# Patient Record
Sex: MALE | Race: WHITE | NOT HISPANIC OR LATINO | URBAN - METROPOLITAN AREA
[De-identification: names, ages, dates, MRNs, and addresses within clinical notes are randomized per-mention and may not be internally consistent; named-entity substitution may affect disease eponyms.]

---

## 2017-02-03 ENCOUNTER — TELEPHONE (OUTPATIENT)
Dept: FAMILY MEDICINE | Facility: CLINIC | Age: 52
End: 2017-02-03

## 2017-02-03 NOTE — TELEPHONE ENCOUNTER
Reason for Call:  Other questions     Detailed comments: pt is calling and was seen by cardiology in [place] yesterday and was told to get some testing done   And pt wants to know if PCP will order so he can have [place]   He is requesting a lipid panel and stress echo       Phone Number Patient can be reached at: Home number on file [phone] (home)    Best Time: any     Can we leave a detailed message on this number? YES    Call taken on 2/3/2017 at 8:08 AM by Jeimy Santacruz

## 2017-02-03 NOTE — TELEPHONE ENCOUNTER
Patient reports he sees a cardiologist with [facility].  That cardiologist ordered a stress echo - to be completed in [place].  Patient requesting to come here instead - asking for stress echo order.  RN advised appt and then PCP may order.  Patient states he will just go to [place].    Lipid lab - he does have a future order for this.  He will come here to complete - transferred to scheduling.    Apple BENDER RN

## 2017-02-06 DIAGNOSIS — E78.5 HYPERLIPIDEMIA WITH TARGET LDL LESS THAN 130: ICD-10-CM

## 2017-02-06 DIAGNOSIS — Z82.49 FH: PREMATURE CORONARY HEART DISEASE: ICD-10-CM

## 2017-02-06 LAB
ALT SERPL W P-5'-P-CCNC: 48 U/L (ref 0–70)
CHOLEST SERPL-MCNC: 173 MG/DL
HDLC SERPL-MCNC: 35 MG/DL
LDLC SERPL CALC-MCNC: 108 MG/DL
NONHDLC SERPL-MCNC: 138 MG/DL
TRIGL SERPL-MCNC: 152 MG/DL

## 2017-02-06 PROCEDURE — 36415 COLL VENOUS BLD VENIPUNCTURE: CPT | Performed by: FAMILY MEDICINE

## 2017-02-06 PROCEDURE — 84460 ALANINE AMINO (ALT) (SGPT): CPT | Performed by: FAMILY MEDICINE

## 2017-02-06 PROCEDURE — 80061 LIPID PANEL: CPT | Performed by: FAMILY MEDICINE

## 2017-03-03 DIAGNOSIS — E78.1 HYPERTRIGLYCERIDEMIA: ICD-10-CM

## 2017-03-03 DIAGNOSIS — Z72.0 TOBACCO ABUSE: ICD-10-CM

## 2017-03-03 DIAGNOSIS — Z82.49 FH: PREMATURE CORONARY HEART DISEASE: ICD-10-CM

## 2017-03-03 RX ORDER — ROSUVASTATIN CALCIUM 40 MG/1
20 TABLET, COATED ORAL DAILY
Qty: 45 TABLET | Refills: 0 | Status: SHIPPED
Start: 2017-03-03 | End: 2017-06-05

## 2017-03-03 NOTE — TELEPHONE ENCOUNTER
CRESTOR 40MG     Last Written Prescription Date: 02/10/2016  Last Fill Quantity: 45 # refills: 3  Last Office Visit with [facility], P or [facility] prescribing provider: 08/22/2016       Lab Results   Component Value Date    CHOL 173 02/06/2017     Lab Results   Component Value Date    HDL 35 02/06/2017     Lab Results   Component Value Date     02/06/2017    LDL 41 06/26/2013     Lab Results   Component Value Date    TRIG 152 02/06/2017     Lab Results   Component Value Date    CHOLHDLRATIO 4.6 09/25/2015     Mayra Marquis  [facility]  [phone]

## 2017-03-03 NOTE — TELEPHONE ENCOUNTER
Routing refill request to provider for review/approval because:  Labs out of range:  Cholesterol labs elevated      Mag Arzola, Pharm.D.  on behalf of [facility] Pharmacist   [email or web address]

## 2017-05-09 ENCOUNTER — TELEPHONE (OUTPATIENT)
Dept: FAMILY MEDICINE | Facility: CLINIC | Age: 52
End: 2017-05-09

## 2017-05-09 NOTE — TELEPHONE ENCOUNTER
Reason for Call:  Medication or medication refill:    Do you use a [facility]?  Name of the pharmacy and phone number for the current request:  [facility] [phone]    Name of the medication requested: Pt would like a new Rx to help him quit smoking.  He was on something a while back, but it made his dizzy and sick.  [facility]    Other request:     Can we leave a detailed message on this number? YES    Phone number patient can be reached at: Home number on file [phone] (home)    Best Time: any    Call taken on 5/9/2017 at 4:18 PM by Kinsey Soares

## 2017-05-10 NOTE — TELEPHONE ENCOUNTER
This requires an office visit. Left a detailed message below as requested to schedule a visit    Ann Kim RN

## 2017-06-03 DIAGNOSIS — J30.9 RHINITIS, ALLERGIC: Primary | ICD-10-CM

## 2017-06-03 RX ORDER — LEVOCETIRIZINE DIHYDROCHLORIDE 5 MG/1
5 TABLET, FILM COATED ORAL EVERY EVENING
Qty: 90 TABLET | Refills: 0 | Status: SHIPPED | OUTPATIENT
Start: 2017-06-03 | End: 2018-08-20

## 2017-06-03 NOTE — TELEPHONE ENCOUNTER
LEVOCETIRIZINE      Last Written Prescription Date: 05-27-16  Last Fill Quantity: 90,  # refills: 3   Last Office Visit with [facility], P or [facility] prescribing provider: 08-22-16

## 2017-06-03 NOTE — TELEPHONE ENCOUNTER
Levocetirizine approved per medication refill protocol one time. Due for office visit in August.    Gloria Kimball, [facility] [place]  [phone]

## 2017-06-05 ENCOUNTER — TELEPHONE (OUTPATIENT)
Dept: FAMILY MEDICINE | Facility: CLINIC | Age: 52
End: 2017-06-05

## 2017-06-05 DIAGNOSIS — E78.1 HYPERTRIGLYCERIDEMIA: ICD-10-CM

## 2017-06-05 DIAGNOSIS — Z82.49 FH: PREMATURE CORONARY HEART DISEASE: ICD-10-CM

## 2017-06-05 DIAGNOSIS — J31.0 CHRONIC RHINITIS: Primary | ICD-10-CM

## 2017-06-05 DIAGNOSIS — Z72.0 TOBACCO ABUSE: ICD-10-CM

## 2017-06-05 RX ORDER — ROSUVASTATIN CALCIUM 40 MG/1
20 TABLET, COATED ORAL DAILY
Qty: 45 TABLET | Refills: 0 | Status: SHIPPED | OUTPATIENT
Start: 2017-06-05 | End: 2017-07-17

## 2017-06-05 NOTE — TELEPHONE ENCOUNTER
Levocetirizine requires a prior authorization. Please call [facility] Agent at [phone] with patient ID# [id number]. Please let us know the outcome.  Thanks,   Hannah Noel  Certified Pharmacy Technician  [facility]  [phone]

## 2017-06-05 NOTE — TELEPHONE ENCOUNTER
Crestor     Last Written Prescription Date: 03/03/17  Last Fill Quantity: 45, # refills: 0  Last Office Visit with [facility], P or [facility] prescribing provider: 08/22/16       Lab Results   Component Value Date    CHOL 173 02/06/2017     Lab Results   Component Value Date    HDL 35 02/06/2017     Lab Results   Component Value Date     02/06/2017     Lab Results   Component Value Date    TRIG 152 02/06/2017     Lab Results   Component Value Date    CHOLHDLRATIO 4.6 09/25/2015

## 2017-06-07 RX ORDER — CETIRIZINE HYDROCHLORIDE 10 MG/1
10 TABLET ORAL EVERY EVENING
Qty: 30 TABLET | Refills: 11 | Status: SHIPPED | OUTPATIENT
Start: 2017-06-07 | End: 2018-08-20

## 2017-06-07 NOTE — TELEPHONE ENCOUNTER
Patient notified.  He picked up the levocetirizine from the pharmacy all ready. He paid out of pocket for a 90 day supply.  He will continue with this for now.    Kelli Moreno RN

## 2017-06-07 NOTE — TELEPHONE ENCOUNTER
PA for levocetirizine has been denied.  Patient needs to have failed therapy with each of the generically available antihistamines for a duration of 2 weeks each.  The preferred are cetirizine (OTC) and loratadine (RX or OTC).

## 2017-06-07 NOTE — TELEPHONE ENCOUNTER
Please call patient,  He has to try both generic cetirizine and loratadine medication each for 2 weeks before they would approve the levocetirizine.  I have sent the first prescription in call cetirizine to the pharmacy.  He needs to call if this does not work out.  Joel Santacruz MD  Family Medicine

## 2017-06-07 NOTE — TELEPHONE ENCOUNTER
Please see Rika's message below.  Per patient's med history he has been on cetirizine in the past, but not the loratadine.    Kelli Moreno RN

## 2017-07-05 DIAGNOSIS — E78.1 HYPERTRIGLYCERIDEMIA: Primary | ICD-10-CM

## 2017-07-05 DIAGNOSIS — E78.5 HYPERLIPIDEMIA WITH TARGET LDL LESS THAN 130: ICD-10-CM

## 2017-07-17 ENCOUNTER — OFFICE VISIT (OUTPATIENT)
Dept: FAMILY MEDICINE | Facility: CLINIC | Age: 52
End: 2017-07-17
Payer: MEDICAID

## 2017-07-17 VITALS
HEART RATE: 64 BPM | HEIGHT: 71 IN | TEMPERATURE: 97 F | DIASTOLIC BLOOD PRESSURE: 60 MMHG | SYSTOLIC BLOOD PRESSURE: 100 MMHG | WEIGHT: 191 LBS | BODY MASS INDEX: 26.74 KG/M2

## 2017-07-17 DIAGNOSIS — E78.5 HYPERLIPIDEMIA WITH TARGET LDL LESS THAN 130: Primary | ICD-10-CM

## 2017-07-17 DIAGNOSIS — Z82.49 FH: PREMATURE CORONARY HEART DISEASE: ICD-10-CM

## 2017-07-17 DIAGNOSIS — E78.1 HYPERTRIGLYCERIDEMIA: ICD-10-CM

## 2017-07-17 DIAGNOSIS — Z72.0 TOBACCO ABUSE: ICD-10-CM

## 2017-07-17 LAB
CHOLEST SERPL-MCNC: 163 MG/DL
HDLC SERPL-MCNC: 35 MG/DL
LDLC SERPL CALC-MCNC: 105 MG/DL
NONHDLC SERPL-MCNC: 128 MG/DL
TRIGL SERPL-MCNC: 115 MG/DL

## 2017-07-17 PROCEDURE — 36415 COLL VENOUS BLD VENIPUNCTURE: CPT | Performed by: FAMILY MEDICINE

## 2017-07-17 PROCEDURE — 80061 LIPID PANEL: CPT | Performed by: FAMILY MEDICINE

## 2017-07-17 PROCEDURE — 99213 OFFICE O/P EST LOW 20 MIN: CPT | Performed by: FAMILY MEDICINE

## 2017-07-17 RX ORDER — ROSUVASTATIN CALCIUM 40 MG/1
20 TABLET, COATED ORAL DAILY
Qty: 45 TABLET | Refills: 3 | Status: SHIPPED | OUTPATIENT
Start: 2017-07-17 | End: 2018-08-11

## 2017-07-17 NOTE — PATIENT INSTRUCTIONS
We will let you know about your lab.          Thank you for choosing [facility].  You may be receiving a survey in the mail from Trini Justin regarding your visit today.  Please take a few minutes to complete and return the survey to let us know how we are doing.      If you have questions or concerns, please contact us via FanXchange or you can contact your care team at [phone].    Our Clinic hours are:  Monday 6:40 am  to 7:00 pm  Tuesday -Friday 6:40 am to 5:00 pm    The [place] outpatient lab hours are:  Monday - Friday 6:10 am to 4:45 pm  Saturdays 7:00 am to 11:00 am  Appointments are required, call [phone]    If you have clinical questions after hours or would like to schedule an appointment,  call the clinic at [phone].

## 2017-07-17 NOTE — MR AVS SNAPSHOT
After Visit Summary   7/17/2017    Andrei Mckenzie    MRN: [id number]           Patient Information     Date Of Birth          1965        Visit Information        Provider Department      7/17/2017 8:20 AM Joel Santacruz MD [facility]        Today's Diagnoses     Hyperlipidemia with target LDL less than 130    -  1    Hypertriglyceridemia        FH: premature coronary heart disease        Tobacco abuse          Care Instructions    We will let you know about your lab.          Thank you for choosing [facility].  You may be receiving a survey in the mail from Trini Justin regarding your visit today.  Please take a few minutes to complete and return the survey to let us know how we are doing.      If you have questions or concerns, please contact us via [employer] or you can contact your care team at [phone].    Our Clinic hours are:  Monday 6:40 am  to 7:00 pm  Tuesday -Friday 6:40 am to 5:00 pm    The [place] outpatient lab hours are:  Monday - Friday 6:10 am to 4:45 pm  Saturdays 7:00 am to 11:00 am  Appointments are required, call [phone]    If you have clinical questions after hours or would like to schedule an appointment,  call the clinic at [phone].            Follow-ups after your visit        Who to contact     If you have questions or need follow up information about today's clinic visit or your schedule please contact [facility] directly at [phone].  Normal or non-critical lab and imaging results will be communicated to you by SoundTaghart, letter or phone within 4 business days after the clinic has received the results. If you do not hear from us within 7 days, please contact the clinic through Osent or phone. If you have a critical or abnormal lab result, we will notify you by phone as soon as possible.  Submit refill requests through [employer] or call your pharmacy and they will forward the refill request to us. Please allow 3 business  "days for your refill to be completed.          Additional Information About Your Visit        MyChart Information     [employer]omasoft gives you secure access to your electronic health record. If you see a primary care provider, you can also send messages to your care team and make appointments. If you have questions, please call your primary care clinic.  If you do not have a primary care provider, please call [phone] and they will assist you.        Care EveryWhere ID     This is your Care EveryWhere ID. This could be used by other organizations to access your [place] medical records  [id number]        Your Vitals Were     Pulse Temperature Height BMI (Body Mass Index)          64 97  F (36.1  C) (Tympanic) 5' 11\" (1.803 m) 26.64 kg/m2         Blood Pressure from Last 3 Encounters:   07/17/17 100/60   08/22/16 104/66   11/25/15 119/83    Weight from Last 3 Encounters:   07/17/17 191 lb (86.6 kg)   08/22/16 197 lb (89.4 kg)   11/25/15 198 lb (89.8 kg)              We Performed the Following     Lipid panel reflex to direct LDL          Today's Medication Changes          These changes are accurate as of: 7/17/17  9:01 AM.  If you have any questions, ask your nurse or doctor.               Stop taking these medicines if you haven't already. Please contact your care team if you have questions.     Grape Seed 100 MG Caps   Stopped by:  Joel Santacruz MD           Turmeric Curcumin 500 MG Caps   Stopped by:  Joel Santacruz MD                    Primary Care Provider Office Phone # Fax #    Joel Santacruz [phone]       [facility] [address] [address]        Equal Access to Services     [facility]: Hadii pietro walsh Somaxwell, waaxda luqadaha, qaybta kaalmada gwyn smith. So [facility] [phone].    ATENCIÓN: Si habla español, tiene a reyes disposición servicios gratuitos de asistencia lingüística. Llame al " [phone].    We comply with applicable federal civil rights laws and [place] laws. We do not discriminate on the basis of race, color, national origin, age, disability sex, sexual orientation or gender identity.            Thank you!     Thank you for choosing [facility]  for your care. Our goal is always to provide you with excellent care. Hearing back from our patients is one way we can continue to improve our services. Please take a few minutes to complete the written survey that you may receive in the mail after your visit with us. Thank you!             Your Updated Medication List - Protect others around you: Learn how to safely use, store and throw away your medicines at www.disposemymeds.org.          This list is accurate as of: 7/17/17  9:01 AM.  Always use your most recent med list.                   Brand Name Dispense Instructions for use Diagnosis    arginine 500 MG capsule     60 capsule    Take 1 capsule (500 mg) by mouth 2 times daily        aspirin 81 MG tablet      Take 1 tablet (81 mg) by mouth daily        beclomethasone 80 MCG/ACT Inhaler    QVAR    1 Inhaler    1 puff into both nares twice daily with adaptor    Allergic rhinitis, unspecified allergic rhinitis type       cetirizine 10 MG tablet    zyrTEC    30 tablet    Take 1 tablet (10 mg) by mouth every evening    Chronic rhinitis       citrulline 600 MG Caps capsule    L-CITRULLINE    60 capsule    Take 1 capsule (600 mg) by mouth 2 times daily        coenzyme Q-10 100 MG Caps     120 capsule    Take 200 mg by mouth 2 times daily        levocetirizine 5 MG tablet    XYZAL    90 tablet    Take 1 tablet (5 mg) by mouth every evening    Rhinitis, allergic       Magnesium Citrate 100 MG Tabs     60 tablet    Take 400 mg by mouth 2 times daily        Menaquinone-7 100 MCG Caps     30 capsule    Take 500 mcg by mouth daily        MULTIVITAL PO      Take 1 tablet by mouth daily        rosuvastatin 40 MG tablet    CRESTOR    45  tablet    Take 0.5 tablets (20 mg) by mouth daily    FH: premature coronary heart disease, Tobacco abuse, Hypertriglyceridemia       TGT EYE ITCH RELIEF OP

## 2017-07-17 NOTE — NURSING NOTE
"Chief Complaint   Patient presents with     Lipids     had labs drawn immediately before this appt. Discuss which is the best testing for laboratory testing. Wants to go off his statins.       Initial /60 (Cuff Size: Adult Large)  Pulse 64  Temp 97  F (36.1  C) (Tympanic)  Ht 5' 11\" (1.803 m)  Wt 191 lb (86.6 kg)  BMI 26.64 kg/m2 Estimated body mass index is 26.64 kg/(m^2) as calculated from the following:    Height as of this encounter: 5' 11\" (1.803 m).    Weight as of this encounter: 191 lb (86.6 kg).  Medication Reconciliation: complete  "

## 2017-07-17 NOTE — PROGRESS NOTES
"  SUBJECTIVE:                                                    Andrei Mckenzie is a 51 year old male who presents to clinic today for the following health issues:  Chief Complaint   Patient presents with     Lipids     had labs drawn immediately before this appt. Discuss which is the best testing for laboratory testing. Wants to go off his statins.         Hyperlipidemia Follow-Up      Rate your low fat/cholesterol diet?: good    Taking statin?  Yes, not sure    Other lipid medications/supplements?:  Co-Q 10    Amount of exercise or physical activity: 6-7 days/week for an average of 45-60 minutes    Problems taking medications regularly: No    Medication side effects: none    Diet: more vegetables, reduced animal and dairy products.          Problem list and histories reviewed & adjusted, as indicated.  Additional history: as documented        Reviewed and updated as needed this visit by clinical staff       Reviewed and updated as needed this visit by Provider         ROS:  Review Of Systems  Skin: negative  Eyes:   Ears/Nose/Throat:   Respiratory: slight cough from cleaning this weekend  Cardiovascular: negative  Gastrointestinal:   Genitourinary:   Musculoskeletal:   Neurologic:   Psychiatric:   Hematologic/Lymphatic/Immunologic:   Endocrine:       OBJECTIVE:                                                    /60 (Cuff Size: Adult Large)  Pulse 64  Temp 97  F (36.1  C) (Tympanic)  Ht 5' 11\" (1.803 m)  Wt 191 lb (86.6 kg)  BMI 26.64 kg/m2  Body mass index is 26.64 kg/(m^2).  GENERAL APPEARANCE: healthy, alert and no distress  RESP: lungs clear to auscultation - no rales, rhonchi or wheezes  CV: regular rates and rhythm, normal S1 S2, no S3 or S4 and no murmur, click or rub  SKIN: no suspicious lesions or rashes  NEURO: Normal strength and tone, mentation intact and speech normal  PSYCH: mentation appears normal and affect normal/bright         ASSESSMENT/PLAN:                                              "       1. Hyperlipidemia with target LDL less than 130  Will let him know about his results.  He has been making changes to his lifestyle.  He still is smoking and trying to quit.  Use the nicotine lozenges to try and quit.    - Lipid panel reflex to direct LDL    2. Hypertriglyceridemia    - Lipid panel reflex to direct LDL    3. FH: premature coronary heart disease      4. Tobacco abuse  He still is smoking, recommend to quit      See Patient Instructions  Counseling/Coordination of care is over 10 min in 15 min appt.      Joel Santacruz MD  [facility]

## 2017-11-08 ENCOUNTER — MYC MEDICAL ADVICE (OUTPATIENT)
Dept: FAMILY MEDICINE | Facility: CLINIC | Age: 52
End: 2017-11-08

## 2017-11-08 NOTE — TELEPHONE ENCOUNTER
Entered by Joel Santacruz MD at 7/17/2017 11:16 AM   Read by Andrei Mckenzie at 11/8/2017  9:39 AM   Lopez Forbes,   Your cholesterol looks better!  Your triglycerides have come down.   At this time I would not make any changes to your cholesterol medication, however recheck it in 3 months.   Sincerely,   Joel Santacruz MD      Due for labs, but reporting he is not on crestor, and unable to get them according to [place] SU[place]?   Sent him a message.   Will postpone and be sure he has responded or scheduled appt,  Shanna Lundberg RNC

## 2017-11-09 NOTE — TELEPHONE ENCOUNTER
"Dr Santacruz, please see his mychart notes, he has not been on the prescribed crestor, he states due to a formulary issue .   He wants to 'further explore\" this issue with you and has many questions,   Do you want him to do the labs now and make an appt with you after?   Recent Labs   Lab Test  07/17/17   0822  02/06/17   0727   09/25/15   0709  04/10/15   0624   CHOL  163  173   < >  164  142   HDL  35*  35*   < >  36*  36*   LDL  105*  108*   < >  101  83   TRIG  115  152*   < >  133  117   CHOLHDLRATIO   --    --    --   4.6  3.9    < > = values in this interval not displayed.     Shanna Lundberg RNC    "

## 2017-11-13 ENCOUNTER — OFFICE VISIT (OUTPATIENT)
Dept: FAMILY MEDICINE | Facility: CLINIC | Age: 52
End: 2017-11-13
Payer: COMMERCIAL

## 2017-11-13 VITALS
BODY MASS INDEX: 26.32 KG/M2 | WEIGHT: 188 LBS | HEART RATE: 84 BPM | HEIGHT: 71 IN | SYSTOLIC BLOOD PRESSURE: 100 MMHG | DIASTOLIC BLOOD PRESSURE: 62 MMHG

## 2017-11-13 DIAGNOSIS — E78.1 HYPERTRIGLYCERIDEMIA: ICD-10-CM

## 2017-11-13 DIAGNOSIS — Z82.49 FH: PREMATURE CORONARY HEART DISEASE: ICD-10-CM

## 2017-11-13 DIAGNOSIS — E78.5 HYPERLIPIDEMIA WITH TARGET LDL LESS THAN 130: ICD-10-CM

## 2017-11-13 DIAGNOSIS — E78.5 HYPERLIPIDEMIA WITH TARGET LDL LESS THAN 130: Primary | ICD-10-CM

## 2017-11-13 LAB
CHOLEST SERPL-MCNC: 235 MG/DL
HDLC SERPL-MCNC: 38 MG/DL
LDLC SERPL CALC-MCNC: 163 MG/DL
NONHDLC SERPL-MCNC: 197 MG/DL
TRIGL SERPL-MCNC: 168 MG/DL

## 2017-11-13 PROCEDURE — 80061 LIPID PANEL: CPT | Performed by: FAMILY MEDICINE

## 2017-11-13 PROCEDURE — 99214 OFFICE O/P EST MOD 30 MIN: CPT | Performed by: FAMILY MEDICINE

## 2017-11-13 PROCEDURE — 36415 COLL VENOUS BLD VENIPUNCTURE: CPT | Performed by: FAMILY MEDICINE

## 2017-11-13 NOTE — PATIENT INSTRUCTIONS
Please follow up with your cardiologist as planned.    I recommend to continue with the Crestor 40 mg taking a half tab a day.    Recommend to quit smoking.          Thank you for choosing [facility].  You may be receiving a survey in the mail from Trini Justin regarding your visit today.  Please take a few minutes to complete and return the survey to let us know how we are doing.      If you have questions or concerns, please contact us via Hypercontext or you can contact your care team at [phone].    Our Clinic hours are:  Monday 6:40 am  to 7:00 pm  Tuesday -Friday 6:40 am to 5:00 pm    The [place] outpatient lab hours are:  Monday - Friday 6:10 am to 4:45 pm  Saturdays 7:00 am to 11:00 am  Appointments are required, call [phone]    If you have clinical questions after hours or would like to schedule an appointment,  call the clinic at [phone].

## 2017-11-13 NOTE — PROGRESS NOTES
"  SUBJECTIVE:   Andrei Mckenzie is a 51 year old male who presents to clinic today for the following health issues:  Chief Complaint   Patient presents with     Lipids     needs Crestor PA     The 10-year ASCVD risk score (Knoxvillelorene ZABALA Jr, et al., 2013) is: 8%    Values used to calculate the score:      Age: 51 years      Sex: Male      Is Non- : No      Diabetic: No      Tobacco smoker: Yes      Systolic Blood Pressure: 100 mmHg      Is BP treated: No      HDL Cholesterol: 38 mg/dL      Total Cholesterol: 235 mg/dL      Hyperlipidemia Follow-Up      Rate your low fat/cholesterol diet?: good    Taking statin?  Yes, no muscle aches from statin    Other lipid medications/supplements?:  Vitamin K2      Amount of exercise or physical activity: stays active    Problems taking medications regularly: No    Medication side effects: none    Diet: much less animal products and dairy             Problem list and histories reviewed & adjusted, as indicated.  Additional history:         Reviewed and updated as needed this visit by clinical staffTobacco  Allergies  Med Hx  Surg Hx  Fam Hx  Soc Hx      Reviewed and updated as needed this visit by Provider         ROS:  CONSTITUTIONAL:NEGATIVE for fever, chills, change in weight  RESP:NEGATIVE for significant cough or SOB  CV: NEGATIVE for chest pain, palpitations or peripheral edema  MUSCULOSKELETAL: NEGATIVE for significant arthralgias or myalgia  NEURO: NEGATIVE for weakness, dizziness or paresthesias  PSYCHIATRIC: NEGATIVE for changes in mood or affect    OBJECTIVE:                                                    /62 (Cuff Size: Adult Large)  Pulse 84  Ht 5' 11\" (1.803 m)  Wt 188 lb (85.3 kg)  BMI 26.22 kg/m2  Body mass index is 26.22 kg/(m^2).  GENERAL APPEARANCE: healthy, alert and no distress  MS: extremities normal- no gross deformities noted  SKIN: no suspicious lesions or rashes  NEURO: Normal strength and tone, mentation intact and " speech normal  PSYCH: mentation appears normal and affect normal/bright    Results for orders placed or performed in visit on 11/13/17   Lipid panel reflex to direct LDL   Result Value Ref Range    Cholesterol 235 (H) <200 mg/dL    Triglycerides 168 (H) <150 mg/dL    HDL Cholesterol 38 (L) >39 mg/dL    LDL Cholesterol Calculated 163 (H) <100 mg/dL    Non HDL Cholesterol 197 (H) <130 mg/dL          ASSESSMENT/PLAN:                                                    (E78.5) Hyperlipidemia with target LDL less than 130  (primary encounter diagnosis)  Comment: reviewed prior cardiology consult, recommendations, including statin use, diet and exercise, asa daily etc.  He is using tobacco products and discussed need to quit.  He discussed other supplement meds, therapies etc.  He discussed difference between eastern and western medicine.    Plan: discussed what I would recommend and why.  He brought up some studies on other supplement meds however I stated that I have not reviewed them and cannot give an opinion. Discussed I am leary of supplements claiming to cure cholesterol plaques etc.  I discussed he should go back to his cardiologist for a recheck, which he stated he plans on doing.    I stated the biggest thing he could do right now is quit smoking.  Also continue with statin med, lifestyle changes, exercise and use asa.  He is not drinking which is great!     (E78.1) Hypertriglyceridemia  Comment:   Plan:     (Z82.49) FH: premature coronary heart disease  Comment:   Plan:         See Patient Instructions  Counseling/Coordination of care is over 15 min in 25 min appt.      Joel Santacruz MD  [facility]

## 2017-11-13 NOTE — MR AVS SNAPSHOT
After Visit Summary   11/13/2017    Andrei Mckenzie    MRN: [id number]           Patient Information     Date Of Birth          1965        Visit Information        Provider Department      11/13/2017 2:20 PM Joel Santacruz MD [facility]        Care Instructions    Please follow up with your cardiologist as planned.    I recommend to continue with the Crestor 40 mg taking a half tab a day.    Recommend to quit smoking.          Thank you for choosing [facility].  You may be receiving a survey in the mail from Trini Justin regarding your visit today.  Please take a few minutes to complete and return the survey to let us know how we are doing.      If you have questions or concerns, please contact us via [employer] or you can contact your care team at [phone].    Our Clinic hours are:  Monday 6:40 am  to 7:00 pm  Tuesday -Friday 6:40 am to 5:00 pm    The [place] outpatient lab hours are:  Monday - Friday 6:10 am to 4:45 pm  Saturdays 7:00 am to 11:00 am  Appointments are required, call [phone]    If you have clinical questions after hours or would like to schedule an appointment,  call the clinic at [phone].            Follow-ups after your visit        Who to contact     If you have questions or need follow up information about today's clinic visit or your schedule please contact [facility] directly at [phone].  Normal or non-critical lab and imaging results will be communicated to you by Agolohart, letter or phone within 4 business days after the clinic has received the results. If you do not hear from us within 7 days, please contact the clinic through Full Capture Solution[employer] or phone. If you have a critical or abnormal lab result, we will notify you by phone as soon as possible.  Submit refill requests through [employer]Stream or call your pharmacy and they will forward the refill request to us. Please allow 3 business days for your refill to be completed.           "Additional Information About Your Visit        MyChart Information     [employer] gives you secure access to your electronic health record. If you see a primary care provider, you can also send messages to your care team and make appointments. If you have questions, please call your primary care clinic.  If you do not have a primary care provider, please call [phone] and they will assist you.        Care EveryWhere ID     This is your Care EveryWhere ID. This could be used by other organizations to access your [place] medical records  [id number]        Your Vitals Were     Pulse Height BMI (Body Mass Index)             84 5' 11\" (1.803 m) 26.22 kg/m2          Blood Pressure from Last 3 Encounters:   11/13/17 100/62   07/17/17 100/60   08/22/16 104/66    Weight from Last 3 Encounters:   11/13/17 188 lb (85.3 kg)   07/17/17 191 lb (86.6 kg)   08/22/16 197 lb (89.4 kg)              Today, you had the following     No orders found for display       Primary Care Provider Office Phone # Fax #    Joel Santacruz [phone] [address] [facility] [address]        Equal Access to Services     LILIAN GREY AH: Hadii pietro ratliff hadasho Soomaali, waaxda luqadaha, qaybta kaalmada adeegyada, gwyn costello. So [facility] [phone].    ATENCIÓN: Si habla español, tiene a reyes disposición servicios gratuitos de asistencia lingüística. Rogelio al [phone].    We comply with applicable federal civil rights laws and [place] laws. We do not discriminate on the basis of race, color, national origin, age, disability, sex, sexual orientation, or gender identity.            Thank you!     Thank you for choosing [facility]  for your care. Our goal is always to provide you with excellent care. Hearing back from our patients is one way we can continue to improve our services. Please take a few minutes to complete the written survey that you may receive in the mail after " your visit with us. Thank you!             Your Updated Medication List - Protect others around you: Learn how to safely use, store and throw away your medicines at www.disposemymeds.org.          This list is accurate as of: 11/13/17  3:04 PM.  Always use your most recent med list.                   Brand Name Dispense Instructions for use Diagnosis    arginine 500 MG capsule     60 capsule    Take 1 capsule (500 mg) by mouth 2 times daily        aspirin 81 MG tablet      Take 1 tablet (81 mg) by mouth daily        beclomethasone 80 MCG/ACT Inhaler    QVAR    1 Inhaler    1 puff into both nares twice daily with adaptor    Allergic rhinitis, unspecified allergic rhinitis type       cetirizine 10 MG tablet    zyrTEC    30 tablet    Take 1 tablet (10 mg) by mouth every evening    Chronic rhinitis       citrulline 600 MG Caps capsule    L-CITRULLINE    60 capsule    Take 1 capsule (600 mg) by mouth 2 times daily        coenzyme Q-10 100 MG Caps     120 capsule    Take 200 mg by mouth 2 times daily        levocetirizine 5 MG tablet    XYZAL    90 tablet    Take 1 tablet (5 mg) by mouth every evening    Rhinitis, allergic       Magnesium Citrate 100 MG Tabs     60 tablet    Take 400 mg by mouth 2 times daily        Menaquinone-7 100 MCG Caps     30 capsule    Take 500 mcg by mouth daily        MULTIVITAL PO      Take 1 tablet by mouth daily        rosuvastatin 40 MG tablet    CRESTOR    45 tablet    Take 0.5 tablets (20 mg) by mouth daily    FH: premature coronary heart disease, Tobacco abuse, Hypertriglyceridemia       TGT EYE ITCH RELIEF OP           Vitamin D3 5000 UNITS Tbdp      Take by mouth 3 times daily

## 2017-12-03 ENCOUNTER — TELEPHONE (OUTPATIENT)
Dept: FAMILY MEDICINE | Facility: CLINIC | Age: 52
End: 2017-12-03

## 2017-12-03 NOTE — TELEPHONE ENCOUNTER
Rosuvastain is non formulary. Please change med or start a prior authorization by calling [employer] at [phone] with patient ID# [id number]. Please let us know the outcome.  Thanks,   Hannah Noel  Certified Pharmacy Technician  [facility]  [phone]

## 2018-08-04 ENCOUNTER — MYC MEDICAL ADVICE (OUTPATIENT)
Dept: FAMILY MEDICINE | Facility: CLINIC | Age: 53
End: 2018-08-04

## 2018-08-06 ENCOUNTER — TELEPHONE (OUTPATIENT)
Dept: FAMILY MEDICINE | Facility: CLINIC | Age: 53
End: 2018-08-06

## 2018-08-06 DIAGNOSIS — E78.5 HYPERLIPIDEMIA WITH TARGET LDL LESS THAN 130: Primary | ICD-10-CM

## 2018-08-06 DIAGNOSIS — Z82.49 FH: PREMATURE CORONARY HEART DISEASE: ICD-10-CM

## 2018-08-06 DIAGNOSIS — Z11.4 ENCOUNTER FOR SCREENING FOR HIV: ICD-10-CM

## 2018-08-06 NOTE — TELEPHONE ENCOUNTER
In addition,  The NMR lipid profile is used in high risk patients for diabetes, not for cardiovascular risk assessment.  Joel Santacruz MD  Family Medicine

## 2018-08-06 NOTE — TELEPHONE ENCOUNTER
NMR lipid profile we do not do that test.  I can order up everything else.  Joel Santacruz MD  Family Medicine

## 2018-08-06 NOTE — TELEPHONE ENCOUNTER
Reason for Call: Request for an order or referral:    Order or referral being requested: patient made an appointment and would like to do labs before appointment    Date needed: before my next appointment    Has the patient been seen by the PCP for this problem? YES    Additional comments: would like to reduce medications and feels the labs need to be checked since its been a year. Cholesterol and liver labs also request NMR if Dr will do it.     Phone number Patient can be reached at:  Home number on file [phone] (home)    Best Time:  any    Can we leave a detailed message on this number?  YES    Call taken on 8/6/2018 at 8:12 AM by Rika Martinez

## 2018-08-06 NOTE — TELEPHONE ENCOUNTER
Notified him via voicemail that fasting labs are ordered, to come in fasting, nothing to eat or drink for about 12 hours prior to the blood draw except water which is ok, and that the NMR is not something we can do here.     Left detailed message per his ok below to do so.   Shanna Lundberg RNC

## 2018-08-06 NOTE — TELEPHONE ENCOUNTER
Recent Labs   Lab Test  11/13/17   0706  07/17/17   0822   09/25/15   0709  04/10/15   0624   CHOL  235*  163   < >  164  142   HDL  38*  35*   < >  36*  36*   LDL  163*  105*   < >  101  83   TRIG  168*  115   < >  133  117   CHOLHDLRATIO   --    --    --   4.6  3.9    < > = values in this interval not displayed.     Potassium   Date Value Ref Range Status   09/25/2015 4.3 mmol/L Final     Creatinine   Date Value Ref Range Status   09/25/2015 0.81 mg/dL Final     Dr Santacruz, please see his request for labs including NMR. (not sure I chose the correct one? )      HIV due also per health maintenance.   Shanna Lundberg RNC

## 2018-08-11 DIAGNOSIS — E78.1 HYPERTRIGLYCERIDEMIA: ICD-10-CM

## 2018-08-11 DIAGNOSIS — Z82.49 FH: PREMATURE CORONARY HEART DISEASE: ICD-10-CM

## 2018-08-11 DIAGNOSIS — Z72.0 TOBACCO ABUSE: ICD-10-CM

## 2018-08-13 RX ORDER — ROSUVASTATIN CALCIUM 40 MG/1
20 TABLET, COATED ORAL DAILY
Qty: 45 TABLET | Refills: 1 | Status: SHIPPED | OUTPATIENT
Start: 2018-08-13 | End: 2018-08-20 | Stop reason: DRUGHIGH

## 2018-08-17 ENCOUNTER — TELEPHONE (OUTPATIENT)
Dept: FAMILY MEDICINE | Facility: CLINIC | Age: 53
End: 2018-08-17

## 2018-08-17 DIAGNOSIS — E78.5 HYPERLIPIDEMIA WITH TARGET LDL LESS THAN 130: ICD-10-CM

## 2018-08-17 DIAGNOSIS — Z82.49 FH: PREMATURE CORONARY HEART DISEASE: ICD-10-CM

## 2018-08-17 DIAGNOSIS — Z11.4 ENCOUNTER FOR SCREENING FOR HIV: ICD-10-CM

## 2018-08-17 LAB
ANION GAP SERPL CALCULATED.3IONS-SCNC: 7 MMOL/L (ref 3–14)
BUN SERPL-MCNC: 14 MG/DL (ref 7–30)
CALCIUM SERPL-MCNC: 8.8 MG/DL (ref 8.5–10.1)
CHLORIDE SERPL-SCNC: 106 MMOL/L (ref 94–109)
CHOLEST SERPL-MCNC: 261 MG/DL
CO2 SERPL-SCNC: 26 MMOL/L (ref 20–32)
CREAT SERPL-MCNC: 0.83 MG/DL (ref 0.66–1.25)
GFR SERPL CREATININE-BSD FRML MDRD: >90 ML/MIN/1.7M2
GLUCOSE SERPL-MCNC: 102 MG/DL (ref 70–99)
HDLC SERPL-MCNC: 34 MG/DL
LDLC SERPL CALC-MCNC: 176 MG/DL
NONHDLC SERPL-MCNC: 227 MG/DL
POTASSIUM SERPL-SCNC: 4.3 MMOL/L (ref 3.4–5.3)
SODIUM SERPL-SCNC: 139 MMOL/L (ref 133–144)
TRIGL SERPL-MCNC: 253 MG/DL

## 2018-08-17 PROCEDURE — 36415 COLL VENOUS BLD VENIPUNCTURE: CPT | Performed by: FAMILY MEDICINE

## 2018-08-17 PROCEDURE — 87389 HIV-1 AG W/HIV-1&-2 AB AG IA: CPT | Performed by: FAMILY MEDICINE

## 2018-08-17 PROCEDURE — 80061 LIPID PANEL: CPT | Performed by: FAMILY MEDICINE

## 2018-08-17 PROCEDURE — 80048 BASIC METABOLIC PNL TOTAL CA: CPT | Performed by: FAMILY MEDICINE

## 2018-08-17 NOTE — TELEPHONE ENCOUNTER
Reason for Call:  Request for results:    Name of test or procedure: Labs    Date of test of procedure: 8/17/18    Location of the test or procedure: [facility]    OK to leave the result message on voice mail or with a family member? Release to [employer] as soon as possible. Would like to review this weekend.    Phone number Patient can be reached at:  Home number on file [phone] (home)    Additional comments:     Call taken on 8/17/2018 at 12:58 PM by Naomi Pérez

## 2018-08-20 ENCOUNTER — OFFICE VISIT (OUTPATIENT)
Dept: FAMILY MEDICINE | Facility: CLINIC | Age: 53
End: 2018-08-20
Payer: COMMERCIAL

## 2018-08-20 VITALS
HEIGHT: 70 IN | SYSTOLIC BLOOD PRESSURE: 110 MMHG | BODY MASS INDEX: 27.63 KG/M2 | DIASTOLIC BLOOD PRESSURE: 76 MMHG | HEART RATE: 76 BPM | TEMPERATURE: 97.7 F | WEIGHT: 193 LBS

## 2018-08-20 DIAGNOSIS — E78.5 HYPERLIPIDEMIA WITH TARGET LDL LESS THAN 130: Primary | ICD-10-CM

## 2018-08-20 DIAGNOSIS — Z82.49 FH: PREMATURE CORONARY HEART DISEASE: ICD-10-CM

## 2018-08-20 DIAGNOSIS — H69.90 DYSFUNCTION OF EUSTACHIAN TUBE, UNSPECIFIED LATERALITY: ICD-10-CM

## 2018-08-20 LAB — HIV 1+2 AB+HIV1 P24 AG SERPL QL IA: NONREACTIVE

## 2018-08-20 PROCEDURE — 99214 OFFICE O/P EST MOD 30 MIN: CPT | Performed by: FAMILY MEDICINE

## 2018-08-20 RX ORDER — ROSUVASTATIN CALCIUM 10 MG/1
10 TABLET, COATED ORAL DAILY
Qty: 90 TABLET | Refills: 3 | Status: ON HOLD | OUTPATIENT
Start: 2018-08-20 | End: 2019-02-26

## 2018-08-20 NOTE — PATIENT INSTRUCTIONS
I have changed your medication to crestor 10 mg a day.    Your ears are clear.    Please recheck your lab in the future.          Thank you for choosing [facility].  You may be receiving a survey in the mail from Trini Justin regarding your visit today.  Please take a few minutes to complete and return the survey to let us know how we are doing.      If you have questions or concerns, please contact us via [employer] or you can contact your care team at [phone].    Our Clinic hours are:  Monday 6:40 am  to 7:00 pm  Tuesday -Friday 6:40 am to 5:00 pm    The [place] outpatient lab hours are:  Monday - Friday 6:10 am to 4:45 pm  Saturdays 7:00 am to 11:00 am  Appointments are required, call [phone]    If you have clinical questions after hours or would like to schedule an appointment,  call the clinic at [phone].

## 2018-08-20 NOTE — PROGRESS NOTES
"  SUBJECTIVE:   Andrei Mckenzie is a 52 year old male who presents to clinic today for the following health issues:  Chief Complaint   Patient presents with     Lipids     discuss dosage of Crestor. He has been off for about 10 days.         Hyperlipidemia Follow-Up      Rate your low fat/cholesterol diet?: good    Taking statin?  Stopped due to dose not want to take 20 mg of the Crestor    Other lipid medications/supplements?:  none      Amount of exercise or physical activity: 6-7 days/week for an average of 15-30 minutes walking the dog    Problems taking medications regularly: Yes,  rebelling    Medication side effects: none    Diet: low fat/cholesterol    He wants to go to crestor 10 mg a day.  He wants to try this and see how it works.  He states he has been out of his medication for 10 days.  He also has not been following his diet as close as he feels he should be.      Having some ear congestion.  Wondering if there is something with his ears.  Cannot hear as well.  Has had some popping.  No dicharge.        Problem list and histories reviewed & adjusted, as indicated.  Additional history: as documented        Reviewed and updated as needed this visit by clinical staff       Reviewed and updated as needed this visit by Provider         ROS:  CONSTITUTIONAL:NEGATIVE for fever, chills, change in weight  INTEGUMENTARY/SKIN: NEGATIVE for worrisome rashes, moles or lesions  RESP:NEGATIVE for significant cough or SOB  CV: NEGATIVE for chest pain, palpitations or peripheral edema  MUSCULOSKELETAL: NEGATIVE for significant arthralgias or myalgia  NEURO: NEGATIVE for weakness, dizziness or paresthesias  PSYCHIATRIC: NEGATIVE for changes in mood or affect    OBJECTIVE:                                                    /76 (Cuff Size: Adult Large)  Pulse 76  Temp 97.7  F (36.5  C) (Tympanic)  Ht 5' 10.25\" (1.784 m)  Wt 193 lb (87.5 kg)  BMI 27.5 kg/m2  Body mass index is 27.5 kg/(m^2).  GENERAL APPEARANCE: " healthy, alert and no distress  Ears, clear, slight clear fluid. No infection. No ear wax  RESP: lungs clear to auscultation - no rales, rhonchi or wheezes  CV: regular rates and rhythm, normal S1 S2, no S3 or S4 and no murmur, click or rub  ABDOMEN: soft, nontender, without hepatosplenomegaly or masses and bowel sounds normal  MS: extremities normal- no gross deformities noted  SKIN: no suspicious lesions or rashes  NEURO: Normal strength and tone, mentation intact and speech normal  PSYCH: mentation appears normal and affect normal/bright    Results for orders placed or performed in visit on 08/17/18   Lipid panel reflex to direct LDL Fasting   Result Value Ref Range    Cholesterol 261 (H) <200 mg/dL    Triglycerides 253 (H) <150 mg/dL    HDL Cholesterol 34 (L) >39 mg/dL    LDL Cholesterol Calculated 176 (H) <100 mg/dL    Non HDL Cholesterol 227 (H) <130 mg/dL   **Basic metabolic panel FUTURE anytime   Result Value Ref Range    Sodium 139 133 - 144 mmol/L    Potassium 4.3 3.4 - 5.3 mmol/L    Chloride 106 94 - 109 mmol/L    Carbon Dioxide 26 20 - 32 mmol/L    Anion Gap 7 3 - 14 mmol/L    Glucose 102 (H) 70 - 99 mg/dL    Urea Nitrogen 14 7 - 30 mg/dL    Creatinine 0.83 0.66 - 1.25 mg/dL    GFR Estimate >90 >60 mL/min/1.7m2    GFR Estimate If Black >90 >60 mL/min/1.7m2    Calcium 8.8 8.5 - 10.1 mg/dL          ASSESSMENT/PLAN:                                                    1. Hyperlipidemia with target LDL less than 130  Not ideally controlled, will try crestor 10 mg a day and follow up with recheck  - rosuvastatin (CRESTOR) 10 MG tablet; Take 1 tablet (10 mg) by mouth daily  Dispense: 90 tablet; Refill: 3  - Lipid panel reflex to direct LDL Fasting; Future    2. FH: premature coronary heart disease  As above  - rosuvastatin (CRESTOR) 10 MG tablet; Take 1 tablet (10 mg) by mouth daily  Dispense: 90 tablet; Refill: 3    3. Dysfunction of Eustachian tube, unspecified laterality  Ears had slight fluid behind TM, no  infection, no wax.      See Patient Instructions    Joel Santacruz MD  [facility]

## 2018-08-20 NOTE — MR AVS SNAPSHOT
After Visit Summary   8/20/2018    Andrei Mckenzie    MRN: [id number]           Patient Information     Date Of Birth          1965        Visit Information        Provider Department      8/20/2018 9:20 AM Joel Santacruz MD [facility]        Today's Diagnoses     Hyperlipidemia with target LDL less than 130    -  1    FH: premature coronary heart disease        Dysfunction of Eustachian tube, unspecified laterality          Care Instructions    I have changed your medication to crestor 10 mg a day.    Your ears are clear.    Please recheck your lab in the future.          Thank you for choosing [facility].  You may be receiving a survey in the mail from [employer] regarding your visit today.  Please take a few minutes to complete and return the survey to let us know how we are doing.      If you have questions or concerns, please contact us via "[employer] or you can contact your care team at [phone].    Our Clinic hours are:  Monday 6:40 am  to 7:00 pm  Tuesday -Friday 6:40 am to 5:00 pm    The [place] outpatient lab hours are:  Monday - Friday 6:10 am to 4:45 pm  Saturdays 7:00 am to 11:00 am  Appointments are required, call [phone]    If you have clinical questions after hours or would like to schedule an appointment,  call the clinic at [phone].            Follow-ups after your visit        Follow-up notes from your care team     Return in about 3 months (around 11/20/2018) for Lab Work.      Future tests that were ordered for you today     Open Future Orders        Priority Expected Expires Ordered    Lipid panel reflex to direct LDL Fasting Routine 11/19/2018 2/18/2019 8/20/2018            Who to contact     If you have questions or need follow up information about today's clinic visit or your schedule please contact [facility] directly at [phone].  Normal or non-critical lab and imaging results will be communicated to you by Karen  "letter or phone within 4 business days after the clinic has received the results. If you do not hear from us within 7 days, please contact the clinic through Just [employer] or phone. If you have a critical or abnormal lab result, we will notify you by phone as soon as possible.  Submit refill requests through [employer] or call your pharmacy and they will forward the refill request to us. Please allow 3 business days for your refill to be completed.          Additional Information About Your Visit        [employer] Information     Just [employer] gives you secure access to your electronic health record. If you see a primary care provider, you can also send messages to your care team and make appointments. If you have questions, please call your primary care clinic.  If you do not have a primary care provider, please call [phone] and they will assist you.        Care EveryWhere ID     This is your Care EveryWhere ID. This could be used by other organizations to access your [place] medical records  [id number]        Your Vitals Were     Pulse Temperature Height BMI (Body Mass Index)          76 97.7  F (36.5  C) (Tympanic) 5' 10.25\" (1.784 m) 27.5 kg/m2         Blood Pressure from Last 3 Encounters:   08/20/18 110/76   11/13/17 100/62   07/17/17 100/60    Weight from Last 3 Encounters:   08/20/18 193 lb (87.5 kg)   11/13/17 188 lb (85.3 kg)   07/17/17 191 lb (86.6 kg)                 Today's Medication Changes          These changes are accurate as of 8/20/18 10:08 AM.  If you have any questions, ask your nurse or doctor.               These medicines have changed or have updated prescriptions.        Dose/Directions    rosuvastatin 10 MG tablet   Commonly known as:  CRESTOR   This may have changed:    - medication strength  - how much to take   Used for:  FH: premature coronary heart disease, Hyperlipidemia with target LDL less than 130   Changed by:  Joel Santacruz MD        Dose:  10 mg   Take 1 tablet (10 mg) by mouth " daily   Quantity:  90 tablet   Refills:  3            Where to get your medicines      These medications were sent to [facility] [place], [place] - [address]  [address] [address]     Phone:  [phone]     rosuvastatin 10 MG tablet                Primary Care Provider Office Phone # Fax #    Joel Santacruz [phone] [address] [facility] [address]        Equal Access to Services     LILIAN GREY : Hadii aad ku hadasho Soomaali, waaxda luqadaha, qaybta kaalmada adeegyada, waxay idiin hayaan adeeg kharash la'claudine . So [facility] [phone].    ATENCIÓN: Si habla español, tiene a reyes disposición servicios gratuitos de asistencia lingüística. [facility] [phone].    We comply with applicable federal civil rights laws and [place] laws. We do not discriminate on the basis of race, color, national origin, age, disability, sex, sexual orientation, or gender identity.            Thank you!     Thank you for choosing [facility]  for your care. Our goal is always to provide you with excellent care. Hearing back from our patients is one way we can continue to improve our services. Please take a few minutes to complete the written survey that you may receive in the mail after your visit with us. Thank you!             Your Updated Medication List - Protect others around you: Learn how to safely use, store and throw away your medicines at www.disposemymeds.org.          This list is accurate as of 8/20/18 10:08 AM.  Always use your most recent med list.                   Brand Name Dispense Instructions for use Diagnosis    arginine 500 MG capsule     60 capsule    Take 1 capsule (500 mg) by mouth 2 times daily        aspirin 81 MG tablet      Take 1 tablet (81 mg) by mouth daily        citrulline 600 MG Caps capsule    L-CITRULLINE    60 capsule    Take 1 capsule (600 mg) by mouth 2 times daily        coenzyme Q-10 100 MG Caps     120 capsule     Take 200 mg by mouth 2 times daily        Magnesium Citrate 100 MG Tabs     60 tablet    Take 400 mg by mouth 2 times daily        Menaquinone-7 100 MCG Caps     30 capsule    Take 500 mcg by mouth daily        MULTIVITAL PO      Take 1 tablet by mouth daily        rosuvastatin 10 MG tablet    CRESTOR    90 tablet    Take 1 tablet (10 mg) by mouth daily    FH: premature coronary heart disease, Hyperlipidemia with target LDL less than 130       TGT EYE ITCH RELIEF OP           Vitamin D3 5000 units Tbdp      Take 15,000 Units by mouth daily

## 2019-02-20 ENCOUNTER — TELEPHONE (OUTPATIENT)
Dept: FAMILY MEDICINE | Facility: CLINIC | Age: 54
End: 2019-02-20

## 2019-02-20 NOTE — TELEPHONE ENCOUNTER
Reason for call:  Patient reporting a symptom    Symptom or request: Pt has had chest pain, down both arms and sweating x 6 weeks.  Transferred call to Clinic RN as high priority    Duration (how long have symptoms been present): 6 weeks    Have you been treated for this before? Yes    Additional comments:     Phone Number patient can be reached at:  Home number on file [phone] (home)    Best Time:  any    Can we leave a detailed message on this number:  YES    Call taken on 2/20/2019 at 8:20 AM by Kinsey Soares

## 2019-02-20 NOTE — TELEPHONE ENCOUNTER
S-(situation): chest pain    B-(background): ongoing for 6 weeks.  Does not have cp right now.  Patient does not believe it is his heart as he had pain in both arms.  + sweating.  Patient states he had a calcium score done at [facility] and showed his arteries significantly clogged    A-(assessment): cp    R-(recommendations): call 911 if not have someone drive you to the ED. Jyotsna KLEIN RN

## 2019-02-22 ENCOUNTER — APPOINTMENT (OUTPATIENT)
Dept: GENERAL RADIOLOGY | Facility: CLINIC | Age: 54
End: 2019-02-22
Attending: FAMILY MEDICINE
Payer: COMMERCIAL

## 2019-02-22 ENCOUNTER — HOSPITAL ENCOUNTER (EMERGENCY)
Facility: CLINIC | Age: 54
Discharge: LEFT BEFORE DISCHARGE | End: 2019-02-22
Attending: FAMILY MEDICINE | Admitting: FAMILY MEDICINE
Payer: COMMERCIAL

## 2019-02-22 ENCOUNTER — TELEPHONE (OUTPATIENT)
Dept: FAMILY MEDICINE | Facility: CLINIC | Age: 54
End: 2019-02-22

## 2019-02-22 VITALS
BODY MASS INDEX: 27.02 KG/M2 | TEMPERATURE: 98 F | OXYGEN SATURATION: 97 % | HEIGHT: 71 IN | RESPIRATION RATE: 11 BRPM | WEIGHT: 193 LBS | HEART RATE: 72 BPM | SYSTOLIC BLOOD PRESSURE: 124 MMHG | DIASTOLIC BLOOD PRESSURE: 80 MMHG

## 2019-02-22 DIAGNOSIS — I21.4 NSTEMI (NON-ST ELEVATED MYOCARDIAL INFARCTION) (H): ICD-10-CM

## 2019-02-22 LAB
ALBUMIN SERPL-MCNC: 3.7 G/DL (ref 3.4–5)
ALP SERPL-CCNC: 68 U/L (ref 40–150)
ALT SERPL W P-5'-P-CCNC: 51 U/L (ref 0–70)
ANION GAP SERPL CALCULATED.3IONS-SCNC: 6 MMOL/L (ref 3–14)
APTT PPP: 36 SEC (ref 22–37)
AST SERPL W P-5'-P-CCNC: 111 U/L (ref 0–45)
BASOPHILS # BLD AUTO: 0 10E9/L (ref 0–0.2)
BASOPHILS NFR BLD AUTO: 0.3 %
BILIRUB SERPL-MCNC: 0.7 MG/DL (ref 0.2–1.3)
BUN SERPL-MCNC: 17 MG/DL (ref 7–30)
CALCIUM SERPL-MCNC: 8.7 MG/DL (ref 8.5–10.1)
CHLORIDE SERPL-SCNC: 104 MMOL/L (ref 94–109)
CO2 SERPL-SCNC: 28 MMOL/L (ref 20–32)
CREAT SERPL-MCNC: 0.91 MG/DL (ref 0.66–1.25)
D DIMER PPP FEU-MCNC: 0.3 UG/ML FEU (ref 0–0.5)
DIFFERENTIAL METHOD BLD: ABNORMAL
EOSINOPHIL # BLD AUTO: 0.1 10E9/L (ref 0–0.7)
EOSINOPHIL NFR BLD AUTO: 1 %
ERYTHROCYTE [DISTWIDTH] IN BLOOD BY AUTOMATED COUNT: 14.5 % (ref 10–15)
GFR SERPL CREATININE-BSD FRML MDRD: >90 ML/MIN/{1.73_M2}
GLUCOSE SERPL-MCNC: 100 MG/DL (ref 70–99)
HCT VFR BLD AUTO: 40.4 % (ref 40–53)
HGB BLD-MCNC: 13.2 G/DL (ref 13.3–17.7)
IMM GRANULOCYTES # BLD: 0 10E9/L (ref 0–0.4)
IMM GRANULOCYTES NFR BLD: 0.2 %
INR PPP: 1.06 (ref 0.86–1.14)
LIPASE SERPL-CCNC: 156 U/L (ref 73–393)
LYMPHOCYTES # BLD AUTO: 4.3 10E9/L (ref 0.8–5.3)
LYMPHOCYTES NFR BLD AUTO: 33.5 %
MCH RBC QN AUTO: 28.8 PG (ref 26.5–33)
MCHC RBC AUTO-ENTMCNC: 32.7 G/DL (ref 31.5–36.5)
MCV RBC AUTO: 88 FL (ref 78–100)
MONOCYTES # BLD AUTO: 1.1 10E9/L (ref 0–1.3)
MONOCYTES NFR BLD AUTO: 8.3 %
NEUTROPHILS # BLD AUTO: 7.2 10E9/L (ref 1.6–8.3)
NEUTROPHILS NFR BLD AUTO: 56.7 %
NRBC # BLD AUTO: 0 10*3/UL
NRBC BLD AUTO-RTO: 0 /100
PLATELET # BLD AUTO: 211 10E9/L (ref 150–450)
POTASSIUM SERPL-SCNC: 3.6 MMOL/L (ref 3.4–5.3)
PROT SERPL-MCNC: 7.6 G/DL (ref 6.8–8.8)
RBC # BLD AUTO: 4.58 10E12/L (ref 4.4–5.9)
SODIUM SERPL-SCNC: 138 MMOL/L (ref 133–144)
TROPONIN I SERPL-MCNC: 11.72 UG/L (ref 0–0.04)
WBC # BLD AUTO: 12.7 10E9/L (ref 4–11)

## 2019-02-22 PROCEDURE — 25000132 ZZH RX MED GY IP 250 OP 250 PS 637: Performed by: FAMILY MEDICINE

## 2019-02-22 PROCEDURE — 84484 ASSAY OF TROPONIN QUANT: CPT | Performed by: FAMILY MEDICINE

## 2019-02-22 PROCEDURE — 83690 ASSAY OF LIPASE: CPT | Performed by: FAMILY MEDICINE

## 2019-02-22 PROCEDURE — 85610 PROTHROMBIN TIME: CPT | Performed by: FAMILY MEDICINE

## 2019-02-22 PROCEDURE — 93010 ELECTROCARDIOGRAM REPORT: CPT | Mod: Z6 | Performed by: FAMILY MEDICINE

## 2019-02-22 PROCEDURE — 85025 COMPLETE CBC W/AUTO DIFF WBC: CPT | Performed by: FAMILY MEDICINE

## 2019-02-22 PROCEDURE — 85379 FIBRIN DEGRADATION QUANT: CPT | Performed by: FAMILY MEDICINE

## 2019-02-22 PROCEDURE — 93005 ELECTROCARDIOGRAM TRACING: CPT

## 2019-02-22 PROCEDURE — 85730 THROMBOPLASTIN TIME PARTIAL: CPT | Performed by: FAMILY MEDICINE

## 2019-02-22 PROCEDURE — 93005 ELECTROCARDIOGRAM TRACING: CPT | Mod: 76

## 2019-02-22 PROCEDURE — 99285 EMERGENCY DEPT VISIT HI MDM: CPT | Mod: 25

## 2019-02-22 PROCEDURE — 71046 X-RAY EXAM CHEST 2 VIEWS: CPT

## 2019-02-22 PROCEDURE — 93010 ELECTROCARDIOGRAM REPORT: CPT | Mod: 76 | Performed by: FAMILY MEDICINE

## 2019-02-22 PROCEDURE — 80053 COMPREHEN METABOLIC PANEL: CPT | Performed by: FAMILY MEDICINE

## 2019-02-22 PROCEDURE — 99291 CRITICAL CARE FIRST HOUR: CPT | Mod: 25 | Performed by: FAMILY MEDICINE

## 2019-02-22 RX ORDER — ASPIRIN 81 MG/1
324 TABLET, CHEWABLE ORAL ONCE
Status: COMPLETED | OUTPATIENT
Start: 2019-02-22 | End: 2019-02-22

## 2019-02-22 RX ADMIN — ASPIRIN 81 MG 324 MG: 81 TABLET ORAL at 16:32

## 2019-02-22 ASSESSMENT — ENCOUNTER SYMPTOMS
VOMITING: 0
BLOOD IN STOOL: 0
WHEEZING: 0
CHILLS: 0
HEADACHES: 0
ABDOMINAL PAIN: 0
CONSTIPATION: 0
SINUS PRESSURE: 0
DYSURIA: 0
NECK PAIN: 1
COUGH: 0
FREQUENCY: 0
FEVER: 0
DIAPHORESIS: 1
SORE THROAT: 0
PALPITATIONS: 0
DIARRHEA: 0
NAUSEA: 0
SHORTNESS OF BREATH: 0

## 2019-02-22 ASSESSMENT — MIFFLIN-ST. JEOR
SCORE: 1738.96
SCORE: 1752.57

## 2019-02-22 NOTE — ED AVS SNAPSHOT
[facility]  [address] [address]  Phone:  [phone]  Fax:  [phone]                                    Andrei Mckenzie   MRN: [id number]    Department:  [facility]   Date of Visit:  2/22/2019           After Visit Summary Signature Page    I have received my discharge instructions, and my questions have been answered. I have discussed any challenges I see with this plan with the nurse or doctor.    ..........................................................................................................................................  Patient/Patient Representative Signature      ..........................................................................................................................................  Patient Representative Print Name and Relationship to Patient    ..................................................               ................................................  Date                                   Time    ..........................................................................................................................................  Reviewed by Signature/Title    ...................................................              ..............................................  Date                                               Time          22EPIC Rev 08/18

## 2019-02-22 NOTE — TELEPHONE ENCOUNTER
Reason for Call:  Lab     Detailed comments: patient is calling wanting to just get a Troponin blood test, as had a chest pain experience on 2/20, and was advised to go to the ER. He did not, and just thinks he should get this blood test. He has not been seen since August by Dr. Chopra. I told him we would call back, but be prepared to have the RN tell him to go to the ER.    Phone Number Patient can be reached at: Home number on file [phone] (home)    Best Time: any    Can we leave a detailed message on this number? YES   Sharon Hinojosa  [facility] [place] [facility]      Call taken on 2/22/2019 at 2:48 PM by Sharon Hinojosa

## 2019-02-22 NOTE — PHARMACY-ANTICOAGULATION SERVICE
Patient to start the heparin C-V/Vascular protocol with a goal anti 10a level of 0.15-0.35. Using a HT of 71 inches and a WT of 86.2 kg, a dosing WT of 79.7 kg was calculated. Based on this dosing WT, give patient a heparin bolus of 4700 units from the bag and then start a drip at 950 units/hr. Check the patient's anti 10a level 6 hours after starting the drip at ~2300.   Per C-V/Vascular protocol.    KARINE TINEO PHARMD

## 2019-02-22 NOTE — ED NOTES
DATE:  2/22/2019   TIME OF RECEIPT FROM LAB:  4042  LAB TEST:  tropnin  LAB VALUE:  11.717  RESULTS GIVEN WITH READ-BACK TO (PROVIDER):  Dr. Dc  TIME LAB VALUE REPORTED TO PROVIDER:   3514

## 2019-02-22 NOTE — TELEPHONE ENCOUNTER
"S-(situation): Patient calling wanting a Troponin blood tests drawn for severe chest pain experience 2/20/19.     B-(background): Patient called clinic 2/20/19 with ongoing chest ain x 6 weeks and was advised to go to the Emergency Department, patient did not go to Emergency Department.       Andrei Mckenzie is a 53 year old male who calls with chest pain.     PRESENTING PROBLEM:  Chest pain    NURSING ASSESSMENT:  Patient complains of chest pain.   Patient reports severe pain \"Tuesday night all night long, going across the chest, pain severe, both arms radiating down the arms\", and  \"oh, man, sweating like crazy\". Patient reports he went to chiropractor and was adjusted in T1 and reports it was \"way out of wack\"  Onset:  Tuesday night, and intermittent x 6 weeks.  Pain is characterized as sharp   Severity severe and constant \"all night long\"  Located left arm, left chest, right arm and right chest   Radiates to left arm and right arm.  Duration constant.  Associated symptoms diaphoresis.  Exacerbated by no known provoking events.  Relieved by none.  Cardiac risk factors:  family history,  high cholesterol and overweight.  Associated Medical History: High calcium scores and showed significant arteries clogged.   Allergies:   Allergies   Allergen Reactions     Fenofibrate      He has muscle aches.     Hmg-Coa-R Inhibitors      He did not tolerate.     Mildew      Mold      Penicillins Nausea and Vomiting         RECOMMENDED DISPOSITION:  Call 911 - patient is hesitant to present to the Emergency Department. Advised several times to call 911, patient states \"i'll make some arrangements and make my way there\".  Will comply with recommendation: No- Barriers to comply with plan of care see above..  If further questions/concerns or if symptoms do not improve, worsen or new symptoms develop, call your PCP or [facility] as soon as possible.      Guideline used:  Telephone Triage Protocols for Nurses, Fifth " "Edition, Rika Felix RN    R-(recommendations): Patient is hesitant to go, stating \"I will work my way up there then\"      "

## 2019-02-22 NOTE — ED NOTES
Detailed discussion with the pt, explaining the importance of receiving appropriate care for his troponin result.  Pt refusing to stay, again requesting to leave AMA

## 2019-02-22 NOTE — ED NOTES
States had chest pain 2 days ago. Lasted all night. Pain went down both arms. Went to the chiropractor the day after and yesterday. The pain is gone. Denies other symptoms

## 2019-02-22 NOTE — ED PROVIDER NOTES
History     Chief Complaint   Patient presents with     Chest Pain     HPI  Andrei Mckenzie is a 53 year old male who presented with a history of hypertriglyceridemia premature coronary disease and an abnormal coronary calcium scoring, obesity who presents with onset Tuesday night of chest pain severe across the chest radiating into both arms and diaphoresis.  His pain persisted throughout that night, and into the a.m. although and improved by a.m. hours and since that time on Tuesday he has not had any chest or arm pain.  He did have some pain in the midline neck in the cervical spine C7/T1 region and that is persisted to some extent.    He went to his chiropractor to see if this would improve his symptoms the next day.  He has been intermittently having symptoms across the chest for some time.  Notes it is sharp pain severe constant typically at night.  No obvious provocation -denies exertional symptoms.  There were mild pleuritic symptoms  He does take an aspirin per day, and is on Crestor 10 mg daily     Does sit for prolonged time, but denies recent prolonged travel (>3 hours) by car or plane, history or FHx of venous thromboembolism, recent surgery (last 4 weeks), active cancer history, hypercoagulable state, estrogen or other medications/conditions causing VTE or  new unilateral swelling or pain in the legs or calves.    He has mostly quit tobacco although occasionally uses this now.  Alcohol cessation in 2013 and has not relapsed.    He wonders if some of his symptoms related to his snowmobiling and along the other night and breathing and the fumes of other snowmobiles.      Stress testing in 2014, reaching 14 METs of exercise without significant EKG changes.    Allergies:  Allergies   Allergen Reactions     Fenofibrate      He has muscle aches.     Hmg-Coa-R Inhibitors      He did not tolerate.     Mildew      Mold      Penicillins Nausea and Vomiting       Problem List:    Patient Active Problem List     Diagnosis Date Noted     Allergic rhinitis, unspecified allergic rhinitis type 05/27/2016     Priority: Medium     Gout 08/27/2015     Priority: Medium     FH: premature coronary heart disease 05/30/2014     Priority: Medium     Allergic conjunctivitis 03/26/2014     Priority: Medium     Hypertriglyceridemia 06/26/2013     Priority: Medium     Hyperlipidemia with target LDL less than 130 12/16/2009     Priority: Medium     Diagnosis updated by automated process. Provider to review and confirm.       Other specified disorder of skin 02/21/2007     Priority: Medium     Headache 02/21/2007     Priority: Medium     Problem list name updated by automated process. Provider to review          Past Medical History:    Past Medical History:   Diagnosis Date     Concussion, unspecified        Past Surgical History:    Past Surgical History:   Procedure Laterality Date     C REMV JAW JOINT         Family History:    Family History   Problem Relation Age of Onset     Cerebrovascular Disease Father      C.A.D. Father      Genitourinary Problems Father         Prostate     Heart Disease Father         pacemakers/  A-fib     Hypertension Father      Circulatory Father      Heart Disease Mother         hole in her heart     Lipids Mother      Osteoporosis Mother      Thyroid Disease Mother      Mental Illness Mother      Heart Disease Paternal Grandmother          A-fib     Neurologic Disorder Paternal Grandmother         dementia     Hypertension Paternal Grandmother      Alzheimer Disease Paternal Grandmother      Other Cancer Paternal Grandmother         Skin - top of head. Removed       Social History:  Marital Status:  Single [1]  Social History     Tobacco Use     Smoking status: Former Smoker     Packs/day: 0.00     Years: 35.00     Pack years: 0.00     Types: Cigarettes, Dip, chew, snus or snuff     Smokeless tobacco: Never Used     Tobacco comment: gum, lozenges   Substance Use Topics     Alcohol use: No     Drug use: No  "       Medications:      arginine 500 MG capsule   aspirin 81 MG tablet   Cholecalciferol (VITAMIN D3) 5000 UNITS TBDP   citrulline (CITRULLINE) 600 MG CAPS   coenzyme Q-10 100 MG CAPS   Ketotifen Fumarate (TGT EYE ITCH RELIEF OP)   Magnesium Citrate 100 MG TABS   Menaquinone-7 100 MCG CAPS   Multiple Vitamins-Minerals (MULTIVITAL PO)   rosuvastatin (CRESTOR) 10 MG tablet         Review of Systems   Constitutional: Positive for diaphoresis. Negative for chills and fever.   HENT: Negative for ear pain, sinus pressure and sore throat.    Eyes: Negative for visual disturbance.   Respiratory: Negative for cough, shortness of breath and wheezing.    Cardiovascular: Positive for chest pain. Negative for palpitations.   Gastrointestinal: Negative for abdominal pain, blood in stool, constipation, diarrhea, nausea and vomiting.   Genitourinary: Negative for dysuria, frequency and urgency.   Musculoskeletal: Positive for neck pain.   Skin: Negative for rash.   Neurological: Negative for headaches.   All other systems reviewed and are negative.      Physical Exam   BP: 127/82  Pulse: 73  Temp: 98  F (36.7  C)  Resp: 18  Height: 180.3 cm (5' 11\")  Weight: 86.2 kg (190 lb)  SpO2: 98 %      Physical Exam   Constitutional: He appears distressed.   HENT:   Mouth/Throat: Oropharynx is clear and moist.   Eyes: Conjunctivae are normal.   Neck: Neck supple.   Cardiovascular: Normal rate, regular rhythm and normal heart sounds. Exam reveals no friction rub.   No murmur heard.  Pulmonary/Chest: Effort normal and breath sounds normal. No stridor. No respiratory distress. He has no wheezes.   Abdominal: Soft. Bowel sounds are normal. He exhibits no distension and no mass. There is no tenderness. There is no guarding.   Musculoskeletal: He exhibits no edema.   Neurological: He is alert. He exhibits normal muscle tone.   Skin: No rash noted. He is not diaphoretic. No pallor.       ED Course        Procedures                   EKG " Interpretation:      Interpreted by Kyle Dc MD  EKG done at 1547 hrs. demonstrates a sinus rhythm at 77 bpm with a normal axis and possible ST depression in leads V2.  Other leads appear to be J-point change and not true depression.  He also has a single lead III that demonstrates some minimal ST elevation of less than a millimeter.  I do not see this in any other leads. There is also T wave flattening/inversion in the lateral leads and a prominent T wave in V2. There is poor R progression V1 through V4.  There may be Q waves here in 2 3 and aVF.  Normal intervals.  Normal conduction.  No ectopy.  Impression sinus rhythm at 77 bpm with 2 isolated lead changes that are noncontiguous.  Lead V2 has a minimal ST depression in lead III has a minimal ST elevation but neither of these have any changes in contiguous leads.  There is also T wave flattening/inversion in the lateral leads and a prominent T wave in V2.  Peritoneum EKG done in June 2014 I do not see on that EKG any of the changes that I see today on current EKG.             EKG Interpretation:      Interpreted by Kyle Dc MD  EKG done at 1632 hrs. demonstrates a sinus rhythm at 75 bpm with a leftward axis and isolated ST depression in lead V2 possibly minimally in V5 V6.  There is an isolated ST elevation in lead III.  T wave inversion in leads II, III, aVF.  T wave inversions/flattening in leads V5 V6.   Impression sinus rhythm 75 bpm with similar changes and ST and T waves as compared with the first EKG done at 1547 today.    Critical Care time:  was 30 minutes for this patient excluding procedures.               Results for orders placed or performed during the hospital encounter of 02/22/19 (from the past 24 hour(s))   CBC with platelets, differential   Result Value Ref Range    WBC 12.7 (H) 4.0 - 11.0 10e9/L    RBC Count 4.58 4.4 - 5.9 10e12/L    Hemoglobin 13.2 (L) 13.3 - 17.7 g/dL    Hematocrit 40.4 40.0 - 53.0 %    MCV 88 78 - 100 fl     MCH 28.8 26.5 - 33.0 pg    MCHC 32.7 31.5 - 36.5 g/dL    RDW 14.5 10.0 - 15.0 %    Platelet Count 211 150 - 450 10e9/L    Diff Method Automated Method     % Neutrophils 56.7 %    % Lymphocytes 33.5 %    % Monocytes 8.3 %    % Eosinophils 1.0 %    % Basophils 0.3 %    % Immature Granulocytes 0.2 %    Nucleated RBCs 0 0 /100    Absolute Neutrophil 7.2 1.6 - 8.3 10e9/L    Absolute Lymphocytes 4.3 0.8 - 5.3 10e9/L    Absolute Monocytes 1.1 0.0 - 1.3 10e9/L    Absolute Eosinophils 0.1 0.0 - 0.7 10e9/L    Absolute Basophils 0.0 0.0 - 0.2 10e9/L    Abs Immature Granulocytes 0.0 0 - 0.4 10e9/L    Absolute Nucleated RBC 0.0    Comprehensive metabolic panel   Result Value Ref Range    Sodium 138 133 - 144 mmol/L    Potassium 3.6 3.4 - 5.3 mmol/L    Chloride 104 94 - 109 mmol/L    Carbon Dioxide 28 20 - 32 mmol/L    Anion Gap 6 3 - 14 mmol/L    Glucose 100 (H) 70 - 99 mg/dL    Urea Nitrogen 17 7 - 30 mg/dL    Creatinine 0.91 0.66 - 1.25 mg/dL    GFR Estimate >90 >60 mL/min/[1.73_m2]    GFR Estimate If Black >90 >60 mL/min/[1.73_m2]    Calcium 8.7 8.5 - 10.1 mg/dL    Bilirubin Total 0.7 0.2 - 1.3 mg/dL    Albumin 3.7 3.4 - 5.0 g/dL    Protein Total 7.6 6.8 - 8.8 g/dL    Alkaline Phosphatase 68 40 - 150 U/L    ALT 51 0 - 70 U/L     (H) 0 - 45 U/L   Troponin I   Result Value Ref Range    Troponin I ES 11.717 (HH) 0.000 - 0.045 ug/L   Lipase   Result Value Ref Range    Lipase 156 73 - 393 U/L   D dimer quantitative   Result Value Ref Range    D Dimer 0.3 0.0 - 0.50 ug/ml FEU   INR   Result Value Ref Range    INR 1.06 0.86 - 1.14   Partial thromboplastin time   Result Value Ref Range    PTT 36 22 - 37 sec   XR Chest 2 Views    Narrative    CHEST TWO VIEWS  2/22/2019 4:21 PM     HISTORY: 53-year-old patient with chest pain.       Impression    IMPRESSION: Since August 24, 2013, heart size is normal. No pleural  effusion, pneumothorax, or abnormal area of consolidation.    APARNA WILSON MD       Medications - No data to  display    Assessments & Plan (with Medical Decision Making)     MDM: Andrei Mckenzie is a 53 year old male who presented with anterior chest pain starting Tuesday night overnight that was associated with diaphoresis and bilateral arm pain and a history of hypertriglyceridemia premature coronary disease abnormal coronary calcium score over 700.  Long-standing tobacco abuse -is now with nicotine replacement.   Pain-free since Tuesday.  Normal examination.  Normal vital signs.  His EKG was abnormal.  With T wave and ST changes in the lateral leads and inferior leads.  Troponin was over 11.  heparin was ordered, but patient refused further management    I spent nearly 40 minutes bedside (on 2 consecutive discussions) talking to the patient about his findings and where he wishes to be transferred -discussed that he had a significant MI and that he needed to be on heparin and to be transferred to a cardiac facility for angiogram.  We discussed this at length and his risk of dying if he did not get this evaluated.  He is likely post myocardial infarction and there may not be anything to stent at this point but he still needs this evaluation.  His risk of implications including myocardial wall dysfunction, valvular dysfunction post MI was discussed as well as his risk of arrhythmia and sudden death.  Despite lengthy conversation about this he still refuses to transfer and will go home.  I also offered to have him get his phone book from home and to return for continued management.  He refuses.       I have reviewed the nursing notes.    I have reviewed the findings, diagnosis, plan and need for follow up with the patient.     Critical Care Addendum    My initial assessment, based on my review of nursing observations, review of vital signs, focused history, physical exam and 12 lead ECG analysis, established that Andrei Mckenzie has and acute coronary syndrome, which requires immediate intervention, and therefore he is critically  ill.     After the initial assessment, the care team initiated multiple lab tests and initiated medication therapy with aspirin to provide stabilization care. Due to the critical nature of this patient, I reassessed nursing observations, vital signs, physical exam and 12 lead ECG analysis multiple times prior to his disposition.     Time also spent performing documentation and reviewing test results.     Critical care time (excluding teaching time and procedures): 30 minutes.        Medication List      There are no discharge medications for this visit.         Final diagnoses:   NSTEMI (non-ST elevated myocardial infarction) (H) - Your event on Tuesday was a heart attack.  Troponin is 11.  EKG is abnormal.  I want to send you to a heart hospital on heparin for angiogram. I am very worried for yopu out of the hospital for complication from heart attack, and the potential for future events.  Please return at any time and please go asap to a heart hospital for further evaluation.  continue aspirin 81 mg orally daily.       2/22/2019   [facility] EMERGENCY DEPARTMENT     Kyle Dc MD  02/23/19 0116

## 2019-02-22 NOTE — ED TRIAGE NOTES
Pt developed CP 2 nights ago, was awake all night with pain, saw chiropractor the next day with gradual relief of pain. Pain was across chest down bilat arms.

## 2019-02-23 ENCOUNTER — HOSPITAL ENCOUNTER (INPATIENT)
Facility: CLINIC | Age: 54
LOS: 3 days | Discharge: HOME | End: 2019-02-26
Attending: EMERGENCY MEDICINE | Admitting: INTERNAL MEDICINE
Payer: COMMERCIAL

## 2019-02-23 DIAGNOSIS — I21.4 NSTEMI (NON-ST ELEVATED MYOCARDIAL INFARCTION) (H): ICD-10-CM

## 2019-02-23 DIAGNOSIS — I24.9 ACS (ACUTE CORONARY SYNDROME) (H): Primary | ICD-10-CM

## 2019-02-23 LAB
ANION GAP SERPL CALCULATED.3IONS-SCNC: 7 MMOL/L (ref 3–14)
BASOPHILS # BLD AUTO: 0 10E9/L (ref 0–0.2)
BASOPHILS NFR BLD AUTO: 0.1 %
BUN SERPL-MCNC: 14 MG/DL (ref 7–30)
CALCIUM SERPL-MCNC: 9.2 MG/DL (ref 8.5–10.1)
CHLORIDE SERPL-SCNC: 106 MMOL/L (ref 94–109)
CHOLEST SERPL-MCNC: 160 MG/DL
CO2 SERPL-SCNC: 26 MMOL/L (ref 20–32)
CREAT SERPL-MCNC: 0.89 MG/DL (ref 0.66–1.25)
DIFFERENTIAL METHOD BLD: ABNORMAL
EOSINOPHIL # BLD AUTO: 0.1 10E9/L (ref 0–0.7)
EOSINOPHIL NFR BLD AUTO: 0.6 %
ERYTHROCYTE [DISTWIDTH] IN BLOOD BY AUTOMATED COUNT: 14.3 % (ref 10–15)
ERYTHROCYTE [DISTWIDTH] IN BLOOD BY AUTOMATED COUNT: 14.7 % (ref 10–15)
GFR SERPL CREATININE-BSD FRML MDRD: >90 ML/MIN/{1.73_M2}
GLUCOSE SERPL-MCNC: 86 MG/DL (ref 70–99)
HCT VFR BLD AUTO: 38.3 % (ref 40–53)
HCT VFR BLD AUTO: 38.6 % (ref 40–53)
HDLC SERPL-MCNC: 40 MG/DL
HGB BLD-MCNC: 12.9 G/DL (ref 13.3–17.7)
HGB BLD-MCNC: 13 G/DL (ref 13.3–17.7)
IMM GRANULOCYTES # BLD: 0 10E9/L (ref 0–0.4)
IMM GRANULOCYTES NFR BLD: 0.2 %
INTERPRETATION ECG - MUSE: NORMAL
LDLC SERPL CALC-MCNC: 97 MG/DL
LMWH PPP CHRO-ACNC: <0.1 IU/ML
LYMPHOCYTES # BLD AUTO: 3.7 10E9/L (ref 0.8–5.3)
LYMPHOCYTES NFR BLD AUTO: 30.3 %
MCH RBC QN AUTO: 28.4 PG (ref 26.5–33)
MCH RBC QN AUTO: 28.6 PG (ref 26.5–33)
MCHC RBC AUTO-ENTMCNC: 33.7 G/DL (ref 31.5–36.5)
MCHC RBC AUTO-ENTMCNC: 33.7 G/DL (ref 31.5–36.5)
MCV RBC AUTO: 84 FL (ref 78–100)
MCV RBC AUTO: 85 FL (ref 78–100)
MONOCYTES # BLD AUTO: 1.1 10E9/L (ref 0–1.3)
MONOCYTES NFR BLD AUTO: 8.8 %
NEUTROPHILS # BLD AUTO: 7.4 10E9/L (ref 1.6–8.3)
NEUTROPHILS NFR BLD AUTO: 60 %
NONHDLC SERPL-MCNC: 120 MG/DL
NRBC # BLD AUTO: 0 10*3/UL
NRBC BLD AUTO-RTO: 0 /100
PLATELET # BLD AUTO: 216 10E9/L (ref 150–450)
PLATELET # BLD AUTO: 216 10E9/L (ref 150–450)
POTASSIUM SERPL-SCNC: 3.8 MMOL/L (ref 3.4–5.3)
RBC # BLD AUTO: 4.54 10E12/L (ref 4.4–5.9)
RBC # BLD AUTO: 4.54 10E12/L (ref 4.4–5.9)
SODIUM SERPL-SCNC: 139 MMOL/L (ref 133–144)
TRIGL SERPL-MCNC: 114 MG/DL
TROPONIN I SERPL-MCNC: 10.15 UG/L (ref 0–0.04)
TROPONIN I SERPL-MCNC: 8.85 UG/L (ref 0–0.04)
TROPONIN I SERPL-MCNC: 9.39 UG/L (ref 0–0.04)
TSH SERPL DL<=0.005 MIU/L-ACNC: 1.12 MU/L (ref 0.4–4)
WBC # BLD AUTO: 11.3 10E9/L (ref 4–11)
WBC # BLD AUTO: 12.3 10E9/L (ref 4–11)

## 2019-02-23 PROCEDURE — 93005 ELECTROCARDIOGRAM TRACING: CPT

## 2019-02-23 PROCEDURE — 80048 BASIC METABOLIC PNL TOTAL CA: CPT | Performed by: EMERGENCY MEDICINE

## 2019-02-23 PROCEDURE — 25000132 ZZH RX MED GY IP 250 OP 250 PS 637: Performed by: INTERNAL MEDICINE

## 2019-02-23 PROCEDURE — 85520 HEPARIN ASSAY: CPT | Performed by: INTERNAL MEDICINE

## 2019-02-23 PROCEDURE — 85027 COMPLETE CBC AUTOMATED: CPT | Performed by: INTERNAL MEDICINE

## 2019-02-23 PROCEDURE — 25000128 H RX IP 250 OP 636: Performed by: EMERGENCY MEDICINE

## 2019-02-23 PROCEDURE — 85025 COMPLETE CBC W/AUTO DIFF WBC: CPT | Performed by: EMERGENCY MEDICINE

## 2019-02-23 PROCEDURE — 99223 1ST HOSP IP/OBS HIGH 75: CPT | Mod: AI | Performed by: INTERNAL MEDICINE

## 2019-02-23 PROCEDURE — 84443 ASSAY THYROID STIM HORMONE: CPT | Performed by: INTERNAL MEDICINE

## 2019-02-23 PROCEDURE — 36415 COLL VENOUS BLD VENIPUNCTURE: CPT | Performed by: INTERNAL MEDICINE

## 2019-02-23 PROCEDURE — 84484 ASSAY OF TROPONIN QUANT: CPT | Performed by: EMERGENCY MEDICINE

## 2019-02-23 PROCEDURE — 99285 EMERGENCY DEPT VISIT HI MDM: CPT | Mod: 25

## 2019-02-23 PROCEDURE — 99222 1ST HOSP IP/OBS MODERATE 55: CPT | Performed by: INTERNAL MEDICINE

## 2019-02-23 PROCEDURE — 21000001 ZZH R&B HEART CARE

## 2019-02-23 PROCEDURE — 84484 ASSAY OF TROPONIN QUANT: CPT | Performed by: INTERNAL MEDICINE

## 2019-02-23 PROCEDURE — 96365 THER/PROPH/DIAG IV INF INIT: CPT

## 2019-02-23 PROCEDURE — 80061 LIPID PANEL: CPT | Performed by: INTERNAL MEDICINE

## 2019-02-23 RX ORDER — METOPROLOL TARTRATE 25 MG/1
25 TABLET, FILM COATED ORAL 2 TIMES DAILY
Status: DISCONTINUED | OUTPATIENT
Start: 2019-02-23 | End: 2019-02-24

## 2019-02-23 RX ORDER — ONDANSETRON 4 MG/1
4 TABLET, ORALLY DISINTEGRATING ORAL EVERY 6 HOURS PRN
Status: DISCONTINUED | OUTPATIENT
Start: 2019-02-23 | End: 2019-02-26 | Stop reason: HOSPADM

## 2019-02-23 RX ORDER — ONDANSETRON 2 MG/ML
4 INJECTION INTRAMUSCULAR; INTRAVENOUS EVERY 6 HOURS PRN
Status: DISCONTINUED | OUTPATIENT
Start: 2019-02-23 | End: 2019-02-26 | Stop reason: HOSPADM

## 2019-02-23 RX ORDER — ASPIRIN 81 MG/1
324 TABLET, CHEWABLE ORAL ONCE
Status: DISCONTINUED | OUTPATIENT
Start: 2019-02-23 | End: 2019-02-23

## 2019-02-23 RX ORDER — ASPIRIN 325 MG
325 TABLET ORAL DAILY
Status: DISCONTINUED | OUTPATIENT
Start: 2019-02-24 | End: 2019-02-25

## 2019-02-23 RX ORDER — ASPIRIN 81 MG/1
324 TABLET, CHEWABLE ORAL ONCE
Status: DISCONTINUED | OUTPATIENT
Start: 2019-02-23 | End: 2019-02-25

## 2019-02-23 RX ORDER — ACETAMINOPHEN 650 MG/1
650 SUPPOSITORY RECTAL EVERY 4 HOURS PRN
Status: DISCONTINUED | OUTPATIENT
Start: 2019-02-23 | End: 2019-02-26 | Stop reason: HOSPADM

## 2019-02-23 RX ORDER — NITROGLYCERIN 0.4 MG/1
0.4 TABLET SUBLINGUAL EVERY 5 MIN PRN
Status: DISCONTINUED | OUTPATIENT
Start: 2019-02-23 | End: 2019-02-25

## 2019-02-23 RX ORDER — LIDOCAINE 40 MG/G
CREAM TOPICAL
Status: DISCONTINUED | OUTPATIENT
Start: 2019-02-23 | End: 2019-02-25

## 2019-02-23 RX ORDER — UREA 10 %
500 LOTION (ML) TOPICAL DAILY
Status: DISCONTINUED | OUTPATIENT
Start: 2019-02-24 | End: 2019-02-26 | Stop reason: HOSPADM

## 2019-02-23 RX ORDER — ACETAMINOPHEN 325 MG/1
650 TABLET ORAL EVERY 4 HOURS PRN
Status: DISCONTINUED | OUTPATIENT
Start: 2019-02-23 | End: 2019-02-26 | Stop reason: HOSPADM

## 2019-02-23 RX ORDER — NALOXONE HYDROCHLORIDE 0.4 MG/ML
.1-.4 INJECTION, SOLUTION INTRAMUSCULAR; INTRAVENOUS; SUBCUTANEOUS
Status: DISCONTINUED | OUTPATIENT
Start: 2019-02-23 | End: 2019-02-25

## 2019-02-23 RX ORDER — ROSUVASTATIN CALCIUM 10 MG/1
10 TABLET, COATED ORAL EVERY EVENING
Status: DISCONTINUED | OUTPATIENT
Start: 2019-02-23 | End: 2019-02-25

## 2019-02-23 RX ORDER — LISINOPRIL 10 MG/1
10 TABLET ORAL DAILY
Status: DISCONTINUED | OUTPATIENT
Start: 2019-02-23 | End: 2019-02-24

## 2019-02-23 RX ORDER — ARGININE 500 MG
500 TABLET ORAL 2 TIMES DAILY
Status: DISCONTINUED | OUTPATIENT
Start: 2019-02-23 | End: 2019-02-26 | Stop reason: HOSPADM

## 2019-02-23 RX ADMIN — HEPARIN SODIUM 12 UNITS/KG/HR: 10000 INJECTION, SOLUTION INTRAVENOUS at 14:40

## 2019-02-23 RX ADMIN — LISINOPRIL 10 MG: 10 TABLET ORAL at 20:43

## 2019-02-23 RX ADMIN — METOPROLOL TARTRATE 25 MG: 25 TABLET ORAL at 20:43

## 2019-02-23 RX ADMIN — ROSUVASTATIN CALCIUM 10 MG: 10 TABLET, FILM COATED ORAL at 20:43

## 2019-02-23 RX ADMIN — Medication 4800 UNITS: at 14:41

## 2019-02-23 ASSESSMENT — ACTIVITIES OF DAILY LIVING (ADL)
ADLS_ACUITY_SCORE: 11
TOILETING: 0-->INDEPENDENT
BATHING: 0-->INDEPENDENT
FALL_HISTORY_WITHIN_LAST_SIX_MONTHS: NO
RETIRED_COMMUNICATION: 0-->UNDERSTANDS/COMMUNICATES WITHOUT DIFFICULTY
DRESS: 0-->INDEPENDENT
TRANSFERRING: 0-->INDEPENDENT
AMBULATION: 0-->INDEPENDENT
SWALLOWING: 0-->SWALLOWS FOODS/LIQUIDS WITHOUT DIFFICULTY
RETIRED_EATING: 0-->INDEPENDENT
ADLS_ACUITY_SCORE: 15
COGNITION: 0 - NO COGNITION ISSUES REPORTED

## 2019-02-23 ASSESSMENT — MIFFLIN-ST. JEOR
SCORE: 1742.59
SCORE: 1743.5

## 2019-02-23 NOTE — Clinical Note
Dear AMANDA Van     Thank you for allowing me to participate in the care of Ms. Ranjan Rojas. She presents today at the [address] [place] [address] in the [facility]. As you know, Ms. Magdaleno Abarca is a 64 y.o. female with history of hypertension, depression, RAY who presents with the chief complaint of mitral regurgitaiton. Patient has had recent episodes of TIA like symptoms. No palpitations or dizziness. She had a TTE that showed mild MR. She was referred her for the MR. She is able to walk without SOA or FARMER. SHe has no limitations. She tells of a significant psychiatric history including multiple suicide attempts. She said she is doing well concerning her mood today. She otherwise denies chest pain, SOA, FARMER, PND, orthopnea, syncope or near syncope. She has no other complaints. Review of Systems   Constitutional: Negative for malaise/fatigue. Respiratory: Negative for shortness of breath. Cardiovascular: Negative for chest pain. Neurological: Negative for weakness. All other systems reviewed and are negative. Past Medical History:   Diagnosis Date    Anxiety     Depression     Diabetes mellitus ([facility] [address]     Fibromyalgia     Hypertension     Hyperthyroidism     Sleep apnea        Past Surgical History:   Procedure Laterality Date    GALLBLADDER SURGERY      HYSTERECTOMY      TUBAL LIGATION         History reviewed. No pertinent family history. Social History     Social History    Marital status:      Spouse name: N/A    Number of children: N/A    Years of education: N/A     Occupational History    Not on file.      Social History Main Topics    Smoking status: Never Smoker    Smokeless tobacco: Never Used    Alcohol use No    Drug use: No    Sexual activity: Not on file     Other Topics Concern    Not on file     Social History Narrative    No narrative on file       Allergies   Allergen Reactions    Geodon [Ziprasidone Hcl] RCA Cine(s)  injected utilizing power injector system. Post stent pictures.

## 2019-02-23 NOTE — Clinical Note
The first balloon was inserted into the right coronary artery and middle right coronary artery.  Max pressure = 12 devon. Total duration = 20 seconds.     Max pressure = 12 devon. Total duration = 25 seconds.    Balloon reinflated a second time: Max pressure = 12 devon. Total duration = 25 seconds.

## 2019-02-23 NOTE — Clinical Note
The first balloon was inserted into the right coronary artery and middle right coronary artery.  Max pressure = 12 devon. Total duration = 26 seconds.     Max pressure = 20 devon. Total duration = 35 seconds.    Balloon reinflated a second time: Max pressure = 20 devon. Total duration = 35 seconds.  Balloon reinflated a third time: Max pressure = 20 devon. Total duration = 30 seconds. Prox RCA

## 2019-02-23 NOTE — PHARMACY-ADMISSION MEDICATION HISTORY
Admission medication history interview status for the 2/23/2019  admission is complete. See EPIC admission navigator for prior to admission medications     Medication history source reliability:Good    Actions taken by pharmacist (provider contacted, etc):None     Additional medication history information not noted on PTA med list :none    Medication reconciliation/reorder completed by provider prior to medication history? Yes    Time spent in this activity: 15 min    Prior to Admission medications    Medication Sig Last Dose Taking? Auth Provider   arginine 500 MG capsule Take 1 capsule (500 mg) by mouth 2 times daily 2/23/2019 at Unknown time Yes Joel Santacruz MD   aspirin 81 MG tablet Take 1 tablet (81 mg) by mouth daily 2/23/2019 at Unknown time Yes Joel Santacruz MD   cholecalciferol (VITAMIN D3) 5000 units (125 mcg) capsule Take 5,000 Units by mouth every evening 2/22/2019 at Unknown time Yes Unknown, Entered By History   Cholecalciferol (VITAMIN D3) 5000 UNITS TBDP Take 10,000 Units by mouth daily  2/23/2019 at am Yes Reported, Patient   citrulline (CITRULLINE) 600 MG CAPS Take 1 capsule (600 mg) by mouth 2 times daily 2/23/2019 at Unknown time Yes Joel Santacruz MD   coenzyme Q-10 100 MG CAPS Take 200 mg by mouth 2 times daily 2/23/2019 at Unknown time Yes Joel Santacruz MD   ketotifen (ZADITOR/REFRESH ANTI-ITCH) 0.025 % ophthalmic solution 1 drop daily as needed for itching  Yes Unknown, Entered By History   Magnesium Citrate 100 MG TABS Take 400 mg by mouth daily  2/23/2019 at Unknown time Yes Joel Santacruz MD   Menaquinone-7 100 MCG CAPS Take 500 mcg by mouth daily  2/23/2019 at Unknown time Yes Joel Santacruz MD   Multiple Vitamins-Minerals (MULTIVITAL PO) Take 1 tablet by mouth daily 2/23/2019 at Unknown time Yes Reported, Patient   rosuvastatin (CRESTOR) 10 MG tablet Take 1 tablet (10 mg) by mouth daily 2/22/2019 at pm  Yes Joel Santacruz MD [place], [place]  repens, (SAW [place] PO) Take 1 tablet by mouth daily 2/22/2019 at Unknown time Yes Unknown, Entered By History

## 2019-02-23 NOTE — ED NOTES
DATE:  2/23/2019   TIME OF RECEIPT FROM LAB:  1449  LAB TEST:  Troponin  LAB VALUE:  10.151  RESULTS GIVEN WITH READ-BACK TO (PROVIDER):  Ludwin Salazar, *  TIME LAB VALUE REPORTED TO PROVIDER:   144

## 2019-02-23 NOTE — H&P
Admitted:     02/23/2019      PRIMARY CARE PHYSICIAN:  Joel Santacruz.      CHIEF COMPLAINT:  Chest pain.      HISTORY:  Andrei Mckenzie is a 53-year-old gentleman with family history of coronary artery disease and prior abnormal CT coronary angiogram test back in 2015, who [facility] after having sustaining severe chest pain on Tuesday and Wednesday.  The patient noted he had chest pain across his chest and involving both his left and right arm.  It was quite severe, this happened on Tuesday and Wednesday.  The patient's pain improved but he has been feeling tired.  He eventually looked up symptoms and went to [facility] yesterday where he was noted to have a troponin of 11. The doctors were unable to convince him to come to the hospital and be admitted and so he discharged himself and came back today to our Emergency Department.  The patient has been chest pain-free.  EKG shows T-wave inversions in the inferior leads.  The patient's BNP is normal.  White count 12.3, hemoglobin 13.2, platelets of 211.  The patient was seen by Dr. Demarcus Salazar.  The patient was afebrile.  Blood was pressure stable, normal oxygen saturation.  He is pain-free.  He has been started on heparin.  He is being admitted as an inpatient for a late admission for suspected completed NSTEMI.      PAST MEDICAL HISTORY:   1.  History of hyperlipidemia, allergic rhinitis, gout.   2.  Allergic conjunctivitis and hypertriglyceridemia.   3.  Remote history of alcohol abuse.      PAST SURGICAL HISTORY:  Jaw surgery.      FAMILY HISTORY:  Positive for father with coronary artery disease and prostate cancer and AFib. Mother with hyperlipidemia, thyroid disease.  Paternal grandmother with Alzheimer's, hypertension.      SOCIAL HISTORY:  Single, currently not employed, smoker, about a pack a day for 35 years.  No alcohol since 2013.  Denies any I.V. drug use.      ALLERGIES:  FENOFIBRATE, HMG-COA  INHIBITORS, MILDEW, MOLD AND PENICILLIN.      CURRENT MEDICATION LIST:   1. L-arginine   2. Aspirin    3. Vitamin D.    4. Sertraline.    5. Coenzyme Q10.    6. Ketotifen opthalamic solution.  7. Magnesium citrate.    8. Iminoquinone.    9. Multivitamin.    10. Crestor.      REVIEW OF SYSTEMS:  A 10-point system reviewed, and as dictated in the history of present illness. The patient denies any fevers, chills, sweats, black or bloody stools.  Positive for chest pain and pain in both arms that is now resolved.  Denies any orthopnea or PND.  Denies any weight gain and denies any night sweats or weight loss.  Otherwise, 10-point review of systems were reviewed and otherwise negative.      PHYSICAL EXAMINATION:   VITAL SIGNS:  Temperature 98.7, heart rate 75, respirations 16, blood pressure 117/77, sats 99% on room air.   GENERAL:  The patient is a 55-year-old  gentleman who is pleasant, cooperative, in no distress.   HEENT:  Unremarkable.   NECK:  Veins not distended, no cervical lymphadenopathy, no JVP elevation.   LUNGS:  Clear to auscultation.   CARDIOVASCULAR:  S1, S2, regular rate and rhythm.  No murmurs, gallops or rubs noted.   ABDOMEN:  Soft, benign.  Normoactive bowel sounds.   EXTREMITIES: Shows no edema.  No clubbing, cyanosis.   NEUROLOGIC:  The patient is grossly nonfocal.  Cranial nerves grossly intact.   SKIN:  Warm, dry, well perfused.   PSYCHIATRIC:  Mood and affect are stable.      LABORATORY DATA: as dictated in HPI.     ASSESSMENT:  Andrei Garcia is a 53 with vascular risk factors including hyperlipidemia, family history of heart disease admitted with suspected completed myocardial infarction with noted T-wave inversions in inferior leads, being admitted for further treatment.      PLAN:   1.  Acute coronary syndrome, late presentation.  The patient will undergo serial troponins.  Suspect that his troponins will be on the down-trending phase.  Get an echocardiogram in the morning and a  cardiology consultation as well.  I will defer to them with respect to possible late PCI for salvage.  The patient will be on beta blockers and ACE inhibitor in addition to his aspirin.  We will continue the patient on Crestor and check a lipid profile.   2.  Hyperlipidemia.  We will continue the patient on Crestor.  Check a lipid profile.   3.  Ongoing tobacco use.  Smoking cessation consultation will be done.      CODE STATUS:  Full.         CARISSA HERNANDEZ MD             D: 2019   T: 2019   MT: AS      Name:     RENETTA LEHMAN   MRN:      [id number]        Account:      [id number]   :      1965        Admitted:     2019                   Document: [id number]       cc: Joel Santacruz MD

## 2019-02-23 NOTE — ED NOTES
"[facility]  ED Nurse Handoff Report    ED Chief complaint: Chest Pain (arm pain and chest pain tuesday went to ed in [place] had elevated troponin and wanted to keep but refused here today to get admitted)      ED Diagnosis:   Final diagnoses:   NSTEMI (non-ST elevated myocardial infarction) (H)       Code Status: Full Code    Allergies:   Allergies   Allergen Reactions     Fenofibrate      He has muscle aches.     Hmg-Coa-R Inhibitors      He did not tolerate.     Mildew      Mold      Penicillins Nausea and Vomiting       Activity level - Baseline/Home:  Independent    Activity Level - Current:   Independent     Needed?: No    Isolation: No  Infection: Not Applicable  Bariatric?: No    Vital Signs:   Vitals:    02/23/19 1359   BP: 108/66   Pulse: 64   Resp: 18   Temp: 98.7  F (37.1  C)   TempSrc: Oral   SpO2: 99%   Weight: 86.6 kg (191 lb)   Height: 1.803 m (5' 11\")       Cardiac Rhythm: ,        Pain level: 0-10 Pain Scale: 0    Is this patient confused?: No   Does this patient have a guardian?  No         If yes, is there guardianship documents in the Epic \"Code/ACP\" activity?  N/A         Guardian Notified?  N/A  [place] - Suicide Severity Rating Scale Completed?  Yes  If yes, what color did the patient score?  White    Patient Report: Initial Complaint: Chest Pain  Focused Assessment: Pt reports \"chest pain event\" on Tuesday where he experienced chest pain, sweating and pain into neck and bilateral arms. Was not seen for this \"event\" until Friday. Seen in ED and was recommended admission for an elevated trop of 11.717. Pt declined and left AMA. Back today for hospital admission. Denies complaints at this time.   Tests Performed: Labs, EKG  Abnormal Results:   Labs Ordered and Resulted from Time of ED Arrival Up to the Time of Departure from the ED   CBC WITH PLATELETS DIFFERENTIAL - Abnormal; Notable for the following components:       Result Value    WBC 12.3 (*)     Hemoglobin " 13.0 (*)     Hematocrit 38.6 (*)     All other components within normal limits   TROPONIN I - Abnormal; Notable for the following components:    Troponin I ES 10.151 (*)     All other components within normal limits   BASIC METABOLIC PANEL   PERIPHERAL IV CATHETER   CARDIAC CONTINUOUS MONITORING   PULSE OXIMETRY NURSING   PLATELETS MONITORED PER HEPARIN TREATMENT PROTOCOL (FOR MEANINGFUL USE   MEASURE WEIGHT   NOTIFY PHYSICIAN   NOTIFY PHYSICIAN       Treatments provided: Heparin    Family Comments: Friend at bedside    OBS brochure/video discussed/provided to patient/family: N/A              Name of person given brochure if not patient: N/A              Relationship to patient: N/A    ED Medications:   Medications   aspirin (ASA) chewable tablet 324 mg (324 mg Oral Not Given 2/23/19 1428)   heparin infusion 25,000 units in 0.45% NaCl 250 mL (12 Units/kg/hr × 79.8 kg (Adjusted) Intravenous New Bag 2/23/19 1440)   heparin Loading Dose bolus dose from infusion pump 4,800 Units (4,800 Units Intravenous Given 2/23/19 1441)       Drips infusing?:  Yes- heparin    For the majority of the shift this patient was Green.   Interventions performed were N/A.    Severe Sepsis OR Septic Shock Diagnosis Present: No    To be done/followed up on inpatient unit:  N/A    ED NURSE PHONE NUMBER: *[phone]

## 2019-02-23 NOTE — Clinical Note
Stent deployed in the middle right coronary artery. Max pressure = 11 devon. Total duration = 25 seconds. Balloon reinflated a second time: Max pressure = 14 devon. Total duration = 30 seconds.

## 2019-02-23 NOTE — PHARMACY
"The following home medications were NOT continued on inpatient admission per \"Discontinuation of nonessential home medications during hospitalization\" policy: citrulline and menaquinone    If a therapeutic holiday is deemed inappropriate per the prescriber, please notify the pharmacist regarding the medication order.    The pharmacist is available to answer any questions and/or concerns the patient may have regarding discontinuation of non-essential medications.    Please ensure that these medications are restarted as needed upon discharge via the medication reconciliation discharge process and included on the discharge medication reconciliation report.    Thank you,  Akil Aliheyder    "

## 2019-02-23 NOTE — Clinical Note
Stent deployed in the proximal right coronary artery. Max pressure = 11 devon. Total duration = 30 seconds. Balloon reinflated a second time: Max pressure = 11 devon. Total duration = 23 seconds.

## 2019-02-23 NOTE — DISCHARGE INSTRUCTIONS
ICD-10-CM    1. NSTEMI (non-ST elevated myocardial infarction) (H) I21.4     Your event on Tuesday was a heart attack.  Troponin is 11.  EKG is abnormal.  I want to send you to a heart hospital on heparin for angiogram. I am very worried for yopu out of the hospital for complication from heart attack, and the potential for future events.  Please return at any time and please go asap to a heart hospital for further evaluation.  continue aspirin 81 mg orally daily.

## 2019-02-23 NOTE — ED PROVIDER NOTES
"  History     Chief Complaint:  Chest Pain     HPI   Andrei Mckenzie is a 53 year old male who presents with chest pain. Review of the patient's chart shows that he was seen at [facility] ED yesterday for evaluation of chest pain. At time this, he reported that several days prior he developed anterior chest pain radiating into his arms bilaterally. Although this resolved the next morning, his continued concern prompted him to go to the ED for evaluation. During his evaluation, he had a negative D Dimer and chest x-ray (as noted below). However, his Troponin was noted to be elevated at 11. ECG was also abnormal with T wave and ST changes. ED staff strongly recommended admission and heparinization at this time, but patient left the ED AMA.     He returns today for further work up of this cardiac event. The patient reports that he \"had to get some things in order\" before being admitted to the hospital. However, he reports that he is now very agreeable to admission and further cardiac evaluation. The patient notes that he has felt generally unwell with increased fatigue and inability to sleep. He denies any current chest pain, SOB, palpitations, leg swelling currently. The patient reports that he has taken multiple doses of Aspirin throughout the night although he is unsure exactly how much he has taken.     Of note, the patient reports that he had a negative stress test in 2014. However, he did not feel that this was accurate, so he \"went behind the doctor's back\" and had additional studies performed in 2015. At this time, he was found to have an elevated calcium scoring on CT of his chest. However, he has not had any cardiac follow up since this time. The patient does report a paternal history of cardiac disease but notes that his father \"kept a lot of that secret\".     CXR, 2 views, 2/22/19:                                                                IMPRESSION: Since August 24, 2013, heart size is normal. No " "pleural effusion, pneumothorax, or abnormal area of consolidation. Report per radiology.     Allergies:  Fenofibrate  Hmg-Coa-R Inhibitors  Mildew  Mold  Penicillins    Medications:    Arginine  Aspirin   Vitamin D  Citrulline  Coenzyme Q10  Ketotifen Fumarate  Magnesium Citrate  Menaquinone   Multivitamin   Crestor      Past Medical History:    Concussion   Allergic rhinitis  Gout  Premature coronary heart disease   Allergic conjunctivitis  Hypertriglyceridemia  Headache    Past Surgical History:    Removal of Jaw joint    Family History:    The patient reports a maternal history of heart disease, lipid disorder, osteoporosis, thyroid disease, and mental illness. The patient reports a paternal history of cerebrovascular disease, CAD, prostate disease, atrial fibrillation, hypertension, dementia, and skin cancer. The patient denies any other relevant family history.     Social History:  The patient is single and presents with a friend. He reports former tobacco use with a quit date of 35 years ago. He also reports smokeless tobacco use. The patient denies alcohol use and drug use. He notes that he is not currently working but has worked in the airline industry previously.     Review of Systems   All other systems reviewed and are negative.    Physical Exam   First Vitals:  BP: 108/66  Pulse: 64  Temp: 98.7  F (37.1  C)  Resp: 18  Height: 180.3 cm (5' 11\")  Weight: 86.6 kg (191 lb)  SpO2: 99 %    Physical Exam  General: nontoxic appearing male sitting upright, male  at bedside  HENT: mucous membranes moist  CV: regular rate, regular rhythm, no lower extremity edema, no JVD, palpable symmetric radial pulses  Resp: clear throughout, normal effort, no crackles or wheezing  GI: abdomen soft and nontender, no guarding, negative Galarza's sign  MSK: no bony tenderness to chest  Skin: appropriately warm and dry, no erythema or vesicles to chest wall  Neuro: alert, clear speech, oriented   Psych: " [facility]      Emergency Department Course   ECG:  Indication: Chest Pain   Findings: Normal sinus rhythm. Inferior infarct,age undetermined. Abnormal ECG. ECG read at 14:09 by Dr. Salazar.  Ventricular rate: 67 bpm  ID Interval: 152 ms  QRS duration: 98 ms  QT/Qtc: 414/437 ms  R-wave axis: 5     Laboratory:  Troponin I (14:13): 10.151 (high)  BMP: WNL (Creatinine 0.89)  CBC: WBC 12.3 (high), HGB 13.0 (low), o/w WNL. ()    Interventions:  Heparin 87820 unit infusion  Heparin loading dose 4800 units  (ASA already taken today >324mg PO)    Emergency Department Course:  Nursing notes and vitals reviewed. I performed an exam of the patient as documented above.   IV inserted and blood drawn. The patient was placed on continuous cardiac monitoring and pulse oximetry.      I performed electronic chart review in [employer].  The patient was placed on continuous cardiac and pulse ox monitoring.    14:32 Discussed the case with Dr. Gavin, on call for hospitalist services.     14:50 Recheck. Reviewed plans for admission.   Findings and plan explained to the Patient who consents to admission. Discussed the patient with Dr. Gavin, who will admit the patient for further monitoring, evaluation, and treatment.     Impression & Plan      Medical Decision Making:  His presentation remains consistent with a myocardial infarction, with primary event likely occurring 3 or 4 days ago.  I reviewed his evaluation yesterday, and agree with the rationale for hospitalization and heparinization that was recommended then and still is appropriate now.  He expresses that he now wishes to be admitted for further care.  His EKG yesterday showed slight ST elevation though has improved today.  There is no indication for immediate Cath Lab activation.  His troponin remains quite elevated though has down trended slightly.  Pulmonary embolism very unlikely given yesterday's negative d-dimer and an alternate more likely diagnosis.  He will be  hospitalized for cardiology consultation and further care which will likely include cardiac catheterization.  He demonstrates reasonable understanding of this anticipated clinical sequence and was admitted to monitored bed by the hospitalist service.      Diagnosis:    ICD-10-CM   1. NSTEMI (non-ST elevated myocardial infarction) (H) I21.4           Disposition:  Admitted under Dr. Gavin.   This record was created at least in part using electronic voice recognition software, so please excuse any typographical errors.      I, Carol Santamaria, am serving as a scribe at 2:05 PM on 2/23/2019 to document services personally performed by Ludwin Salazar MD, based on my observations and the provider's statements to me.        Ludwin Salazar MD  02/23/19 [id number]

## 2019-02-23 NOTE — Clinical Note
The first balloon was inserted into the right coronary artery and distal right coronary artery.  Max pressure = 12 devon. Total duration = 13 seconds.     Max pressure = 12 devon. Total duration = 13 seconds.    Balloon reinflated a second time: Max pressure = 12 devon. Total duration = 13 seconds.  Balloon reinflated a third time: Max pressure = 12 devon. Total duration = 20 seconds. Mid  RCA

## 2019-02-24 ENCOUNTER — APPOINTMENT (OUTPATIENT)
Dept: CARDIOLOGY | Facility: CLINIC | Age: 54
End: 2019-02-24
Attending: INTERNAL MEDICINE
Payer: COMMERCIAL

## 2019-02-24 LAB
LMWH PPP CHRO-ACNC: 0.25 IU/ML
NT-PROBNP SERPL-MCNC: 1253 PG/ML (ref 0–900)

## 2019-02-24 PROCEDURE — 85520 HEPARIN ASSAY: CPT | Performed by: INTERNAL MEDICINE

## 2019-02-24 PROCEDURE — 99233 SBSQ HOSP IP/OBS HIGH 50: CPT | Performed by: INTERNAL MEDICINE

## 2019-02-24 PROCEDURE — 25000128 H RX IP 250 OP 636: Performed by: INTERNAL MEDICINE

## 2019-02-24 PROCEDURE — 21000001 ZZH R&B HEART CARE

## 2019-02-24 PROCEDURE — 99232 SBSQ HOSP IP/OBS MODERATE 35: CPT | Performed by: INTERNAL MEDICINE

## 2019-02-24 PROCEDURE — 25000132 ZZH RX MED GY IP 250 OP 250 PS 637: Performed by: INTERNAL MEDICINE

## 2019-02-24 PROCEDURE — 36415 COLL VENOUS BLD VENIPUNCTURE: CPT | Performed by: EMERGENCY MEDICINE

## 2019-02-24 PROCEDURE — 25500064 ZZH RX 255 OP 636: Performed by: INTERNAL MEDICINE

## 2019-02-24 PROCEDURE — 83880 ASSAY OF NATRIURETIC PEPTIDE: CPT | Performed by: INTERNAL MEDICINE

## 2019-02-24 PROCEDURE — 40000264 ECHOCARDIOGRAM COMPLETE

## 2019-02-24 PROCEDURE — 25800030 ZZH RX IP 258 OP 636: Performed by: INTERNAL MEDICINE

## 2019-02-24 PROCEDURE — 93306 TTE W/DOPPLER COMPLETE: CPT | Mod: 26 | Performed by: INTERNAL MEDICINE

## 2019-02-24 PROCEDURE — 36415 COLL VENOUS BLD VENIPUNCTURE: CPT | Performed by: INTERNAL MEDICINE

## 2019-02-24 RX ORDER — LISINOPRIL 2.5 MG/1
2.5 TABLET ORAL DAILY
Status: DISCONTINUED | OUTPATIENT
Start: 2019-02-24 | End: 2019-02-24 | Stop reason: CLARIF

## 2019-02-24 RX ORDER — DIPHENHYDRAMINE HCL 25 MG
25 CAPSULE ORAL
Status: DISCONTINUED | OUTPATIENT
Start: 2019-02-24 | End: 2019-02-26 | Stop reason: HOSPADM

## 2019-02-24 RX ADMIN — METOPROLOL TARTRATE 12.5 MG: 25 TABLET, FILM COATED ORAL at 22:36

## 2019-02-24 RX ADMIN — SODIUM CHLORIDE 500 ML: 9 INJECTION, SOLUTION INTRAVENOUS at 02:15

## 2019-02-24 RX ADMIN — ASPIRIN 325 MG ORAL TABLET 325 MG: 325 PILL ORAL at 08:38

## 2019-02-24 RX ADMIN — DIPHENHYDRAMINE HYDROCHLORIDE 25 MG: 25 CAPSULE ORAL at 23:15

## 2019-02-24 RX ADMIN — ROSUVASTATIN CALCIUM 10 MG: 10 TABLET, FILM COATED ORAL at 22:36

## 2019-02-24 RX ADMIN — SODIUM CHLORIDE, POTASSIUM CHLORIDE, SODIUM LACTATE AND CALCIUM CHLORIDE 500 ML: 600; 310; 30; 20 INJECTION, SOLUTION INTRAVENOUS at 04:57

## 2019-02-24 RX ADMIN — HUMAN ALBUMIN MICROSPHERES AND PERFLUTREN 9 ML: 10; .22 INJECTION, SOLUTION INTRAVENOUS at 10:32

## 2019-02-24 RX ADMIN — METOPROLOL TARTRATE 12.5 MG: 25 TABLET, FILM COATED ORAL at 13:27

## 2019-02-24 RX ADMIN — Medication 500 MG: at 08:31

## 2019-02-24 RX ADMIN — ACETAMINOPHEN 650 MG: 325 TABLET, FILM COATED ORAL at 02:15

## 2019-02-24 RX ADMIN — DIPHENHYDRAMINE HYDROCHLORIDE 25 MG: 25 CAPSULE ORAL at 03:10

## 2019-02-24 RX ADMIN — ACETAMINOPHEN 650 MG: 325 TABLET, FILM COATED ORAL at 23:15

## 2019-02-24 RX ADMIN — HEPARIN SODIUM 1250 UNITS/HR: 10000 INJECTION, SOLUTION INTRAVENOUS at 06:36

## 2019-02-24 RX ADMIN — Medication 4000 UNITS: at 01:01

## 2019-02-24 RX ADMIN — Medication 500 MG: at 22:37

## 2019-02-24 RX ADMIN — Medication 500 MG: at 08:39

## 2019-02-24 ASSESSMENT — ACTIVITIES OF DAILY LIVING (ADL)
ADLS_ACUITY_SCORE: 12
ADLS_ACUITY_SCORE: 11

## 2019-02-24 ASSESSMENT — MIFFLIN-ST. JEOR: SCORE: 1746.22

## 2019-02-24 NOTE — PROVIDER NOTIFICATION
MD Notification    Notified Person: MD    Notified Person Name: Dr. Gavin     Notification Date/Time: 2/24 @ 1:45 AM     Notification Interaction: Paged     Purpose of Notification:  Pt asymptomatic hypotensive 82/57     Orders Received: one time dose of 500 ml fluid bolus (infuse over two hours), dose of metoprolol reduced to 12.5 mg/hr and lisinopril dose reduced to 2.5 mg     Comments:

## 2019-02-24 NOTE — PLAN OF CARE
A&O. Anxious about hospitalization.Tele: SB/SR, HR 55-80's. BP's low 80/40's, MD notified. One 500 ml bolus of normal saline given and one 500 ml fluid bolus of LR given with no improvement in BP. Pt asymptomatic. MD aware. 10 mg of lisinopril started yesterday, daily dose has been reduced to 2.5 mg. Metoprolol dose decreased 12.5 mg BID. Denies CP and SOB. Independent at baseline however bedrest d/t low BP's this AM. Heparin gtt increased to 12.5 ml/hr w/ 4,000 unit bolus last evening. Hep10a recheck @ 7:00 AM. C/o neck pain w/ PRN tylenol given w/ improvement. Pt reports not sleeping last several days given anxiety. PRN benadryl given for sleep. Troponin trending down w/ peak 11.7. Plan for echo today and likely angiogram Monday.

## 2019-02-24 NOTE — PROVIDER NOTIFICATION
MD Notification    Notified Person: MD    Notified Person Name: Dr. Gavin     Notification Date/Time: 2/24 @ 4:30 AM     Notification Interaction: Paged     Purpose of Notification: Despite fluid bolus pt blood pressure remains 80/40's    Orders Received: LR bolus 500 ml over 2 hours     Comments:

## 2019-02-24 NOTE — PROGRESS NOTES
SPIRITUAL HEALTH SERVICES Progress Note  [facility]    Initial visit with pt Andrei per consult order. Pt was alert and oriented. Pt reports that he feels fine.  Pt said that he had pain on his both arms and around the neck area for nine hours. Pt didn't know that he had a heart attack.   When pt had a heat attack, pt thought that he might die and worried about his mother and dog. Pt said that he never  and has no child. Pt reports that he used be a .      had a lengthy visit with pt, and pt talked about 12 step program of Alcoholics Anonymous. Pt shared a story that his grand-mother who was an active member of [facility] [employer] told pt that he will go hell because he wasn't baptized when he was a baby. His grandmother's words affected him a lot, and he had a hard time with those words. That's why he is still working on the step 3.      Pt is not Mandaen, but spiritual. Pt said that 12 step program is very spiritual.  Pt showed  various books that related with 12 step program and AA meeting.  Pt states that he stopped his drinking in 2013, he has been sober since then. Pt said that alcohol addiction is his family problem.     Pt shared his distress that he will do his procedure tomorrow, and  explained about mindfulness breathing and encouraged pt to practice.      provided listening, encouragement, and support.     I will follow up with pt for duration of stay.     Chary Oswald  Chaplain Resident

## 2019-02-24 NOTE — PLAN OF CARE
Pt has been pain free with V/signs stable and maintaining his O2 sats in the mid 90's on Rm air. B/P 101/68 after 12.5 mg of Metoprolol was given at 13:37. Lisinopril was discontinued by Dr Yao. Trops are treading down and Pt did sign consent to have Angiogram tomorrow 2/25/19 . Remains in NSR

## 2019-02-24 NOTE — PLAN OF CARE
Pt has been pain free with V/Signs stable and maintaining his O2 sats in the main 90's. Trops are elevated 11.7, 10.51 with 1830 Trop pending (Cardiology is aware). Echo will done tomorrow and hopefully the Pt will decide to have an Angiogram on Monday. Heparin gtt infusing at 950 units per hour with Hep 10 A ordered at 2100. Remains in NSR

## 2019-02-24 NOTE — PROGRESS NOTES
"[facility]  Hospitalist Progress Note  Александр Gavin MD  02/24/2019    Assessment & Plan   ASSESSMENT:  Andrei Garcia is a 53 with vascular risk factors including hyperlipidemia, family history of heart disease admitted with suspected completed myocardial infarction with noted T-wave inversions in inferior leads, being admitted for further treatment.      PLAN:   1.  Acute coronary syndrome, completed inferior MI.   -- serial troponins down going.    -- echo shows EF 50%  -- plan for [facility] tomorrow.  -- continue BB, hold ACE due to low bp  --  We will continue the patient on Crestor and check a lipid profile.   -- soft BP received multiple boluses.  2.  Hyperlipidemia.  We will continue the patient on Crestor.    -- , HDL 40, LDL 97  3.  Ongoing tobacco use.   -- Smoking cessation consultation will be done.   4. DVT prophylaxis  -- PCD     CODE STATUS:  Full.        Interval History   -- chart reviewed  -- No chest pain  -- troponin downtrending  -- Echo reviewed.    -Data reviewed today: I reviewed all new labs and imaging over the last 24 hours. I personally reviewed no images or EKG's today.    Physical Exam   Heart Rate: 70, Blood pressure 98/59, pulse 62, temperature 99.3  F (37.4  C), temperature source Oral, resp. rate 16, height 1.803 m (5' 11\"), weight 86.9 kg (191 lb 9.6 oz), SpO2 98 %.  Vitals:    02/23/19 1359 02/23/19 1603 02/24/19 0313   Weight: 86.6 kg (191 lb) 86.5 kg (190 lb 12.8 oz) 86.9 kg (191 lb 9.6 oz)     Vital Signs with Ranges  Temp:  [97.7  F (36.5  C)-99.5  F (37.5  C)] 99.3  F (37.4  C)  Pulse:  [62] 62  Heart Rate:  [56-71] 70  Resp:  [16] 16  BP: ()/(41-72) 98/59  SpO2:  [94 %-98 %] 98 %  I/O's Last 24 hours  I/O last 3 completed shifts:  In: 2550 [P.O.:1430; I.V.:620; IV Piggyback:500]  Out: 1000 [Urine:1000]    Constitutional: Awake, alert, cooperative, no apparent distress  Respiratory: Clear to auscultation bilaterally, no crackles or " wheezing  Cardiovascular: Regular rate and rhythm, normal S1 and S2, and no murmur noted  GI: Normal bowel sounds, soft, non-distended, non-tender  Skin/Integumen: No rashes, no cyanosis, no edema  Other:      Medications   All medications were reviewed.    - MEDICATION INSTRUCTIONS -       HEParin 1,250 Units/hr (02/24/19 0915)     - MEDICATION INSTRUCTIONS -       - MEDICATION INSTRUCTIONS -         arginine  500 mg Oral BID     aspirin  324 mg Oral Once     aspirin  325 mg Oral Daily     magnesium gluconate  500 mg Oral Daily     metoprolol tartrate  12.5 mg Oral BID     rosuvastatin  10 mg Oral QPM     sodium chloride (PF)  3 mL Intracatheter Q8H        Data   Recent Labs   Lab 02/23/19  2152 02/23/19  1821 02/23/19  1413 02/22/19  1552   WBC  --  11.3* 12.3* 12.7*   HGB  --  12.9* 13.0* 13.2*   MCV  --  84 85 88   PLT  --  216 216 211   INR  --   --   --  1.06   NA  --   --  139 138   POTASSIUM  --   --  3.8 3.6   CHLORIDE  --   --  106 104   CO2  --   --  26 28   BUN  --   --  14 17   CR  --   --  0.89 0.91   ANIONGAP  --   --  7 6   TERRY  --   --  9.2 8.7   GLC  --   --  86 100*   ALBUMIN  --   --   --  3.7   PROTTOTAL  --   --   --  7.6   BILITOTAL  --   --   --  0.7   ALKPHOS  --   --   --  68   ALT  --   --   --  51   AST  --   --   --  111*   LIPASE  --   --   --  156   TROPI 8.850* 9.393* 10.151* 11.717*       No results found for this or any previous visit (from the past 24 hour(s)).    Александр Gavin MD  Text Page  (7am to 6pm)
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
## 2019-02-24 NOTE — PLAN OF CARE
OT/CR: Reviewed chart, talked to nursing.  Pt currently having echo, possible angiogram planned for tomorrow.  Pt has been very anxious and having some ongoing pain according to nursing.  Troponins high but coming down.  Asked us to hold eval today.  Will hold session until angiogram is completed.  Pt continues to smoke, would benefit from a smoking cessation eval as well.  This has has been already ordered.
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
## 2019-02-25 ENCOUNTER — SURGERY (OUTPATIENT)
Age: 54
End: 2019-02-25
Payer: COMMERCIAL

## 2019-02-25 LAB
ANION GAP SERPL CALCULATED.3IONS-SCNC: 8 MMOL/L (ref 3–14)
BUN SERPL-MCNC: 15 MG/DL (ref 7–30)
CALCIUM SERPL-MCNC: 8.9 MG/DL (ref 8.5–10.1)
CHLORIDE SERPL-SCNC: 108 MMOL/L (ref 94–109)
CHOLEST SERPL-MCNC: 155 MG/DL
CO2 SERPL-SCNC: 25 MMOL/L (ref 20–32)
CREAT SERPL-MCNC: 0.88 MG/DL (ref 0.66–1.25)
GFR SERPL CREATININE-BSD FRML MDRD: >90 ML/MIN/{1.73_M2}
GLUCOSE SERPL-MCNC: 87 MG/DL (ref 70–99)
HDLC SERPL-MCNC: 30 MG/DL
KCT BLD-ACNC: 257 SEC (ref 75–150)
LDLC SERPL CALC-MCNC: 98 MG/DL
LMWH PPP CHRO-ACNC: 0.13 IU/ML
NONHDLC SERPL-MCNC: 125 MG/DL
POTASSIUM SERPL-SCNC: 4.2 MMOL/L (ref 3.4–5.3)
SODIUM SERPL-SCNC: 141 MMOL/L (ref 133–144)
TRIGL SERPL-MCNC: 133 MG/DL

## 2019-02-25 PROCEDURE — 80048 BASIC METABOLIC PNL TOTAL CA: CPT | Performed by: INTERNAL MEDICINE

## 2019-02-25 PROCEDURE — 80061 LIPID PANEL: CPT | Performed by: INTERNAL MEDICINE

## 2019-02-25 PROCEDURE — 99153 MOD SED SAME PHYS/QHP EA: CPT | Performed by: INTERNAL MEDICINE

## 2019-02-25 PROCEDURE — 25000132 ZZH RX MED GY IP 250 OP 250 PS 637: Performed by: INTERNAL MEDICINE

## 2019-02-25 PROCEDURE — 93010 ELECTROCARDIOGRAM REPORT: CPT | Performed by: INTERNAL MEDICINE

## 2019-02-25 PROCEDURE — 21000001 ZZH R&B HEART CARE

## 2019-02-25 PROCEDURE — 93454 CORONARY ARTERY ANGIO S&I: CPT | Mod: 26 | Performed by: INTERNAL MEDICINE

## 2019-02-25 PROCEDURE — 99233 SBSQ HOSP IP/OBS HIGH 50: CPT | Mod: 25 | Performed by: INTERNAL MEDICINE

## 2019-02-25 PROCEDURE — C1894 INTRO/SHEATH, NON-LASER: HCPCS | Performed by: INTERNAL MEDICINE

## 2019-02-25 PROCEDURE — 027035Z DILATION OF CORONARY ARTERY, ONE ARTERY WITH TWO DRUG-ELUTING INTRALUMINAL DEVICES, PERCUTANEOUS APPROACH: ICD-10-PCS | Performed by: INTERNAL MEDICINE

## 2019-02-25 PROCEDURE — C1887 CATHETER, GUIDING: HCPCS | Performed by: INTERNAL MEDICINE

## 2019-02-25 PROCEDURE — B2111ZZ FLUOROSCOPY OF MULTIPLE CORONARY ARTERIES USING LOW OSMOLAR CONTRAST: ICD-10-PCS | Performed by: INTERNAL MEDICINE

## 2019-02-25 PROCEDURE — 25000128 H RX IP 250 OP 636

## 2019-02-25 PROCEDURE — C1874 STENT, COATED/COV W/DEL SYS: HCPCS | Performed by: INTERNAL MEDICINE

## 2019-02-25 PROCEDURE — 85347 COAGULATION TIME ACTIVATED: CPT

## 2019-02-25 PROCEDURE — 25000128 H RX IP 250 OP 636: Performed by: INTERNAL MEDICINE

## 2019-02-25 PROCEDURE — 99152 MOD SED SAME PHYS/QHP 5/>YRS: CPT | Mod: GC | Performed by: INTERNAL MEDICINE

## 2019-02-25 PROCEDURE — 36415 COLL VENOUS BLD VENIPUNCTURE: CPT | Performed by: EMERGENCY MEDICINE

## 2019-02-25 PROCEDURE — C1769 GUIDE WIRE: HCPCS | Performed by: INTERNAL MEDICINE

## 2019-02-25 PROCEDURE — 27210794 ZZH OR GENERAL SUPPLY STERILE: Performed by: INTERNAL MEDICINE

## 2019-02-25 PROCEDURE — 92928 PRQ TCAT PLMT NTRAC ST 1 LES: CPT | Mod: RC | Performed by: INTERNAL MEDICINE

## 2019-02-25 PROCEDURE — C1725 CATH, TRANSLUMIN NON-LASER: HCPCS | Performed by: INTERNAL MEDICINE

## 2019-02-25 PROCEDURE — 99207 ZZC CDG-MDM COMPONENT: MEETS MODERATE - UP CODED: CPT | Performed by: INTERNAL MEDICINE

## 2019-02-25 PROCEDURE — 85520 HEPARIN ASSAY: CPT | Performed by: INTERNAL MEDICINE

## 2019-02-25 PROCEDURE — 25000125 ZZHC RX 250: Performed by: INTERNAL MEDICINE

## 2019-02-25 PROCEDURE — 85520 HEPARIN ASSAY: CPT | Performed by: EMERGENCY MEDICINE

## 2019-02-25 PROCEDURE — 25800030 ZZH RX IP 258 OP 636: Performed by: INTERNAL MEDICINE

## 2019-02-25 PROCEDURE — C9600 PERC DRUG-EL COR STENT SING: HCPCS | Performed by: INTERNAL MEDICINE

## 2019-02-25 PROCEDURE — 99152 MOD SED SAME PHYS/QHP 5/>YRS: CPT | Performed by: INTERNAL MEDICINE

## 2019-02-25 PROCEDURE — 93005 ELECTROCARDIOGRAM TRACING: CPT

## 2019-02-25 PROCEDURE — 99232 SBSQ HOSP IP/OBS MODERATE 35: CPT | Performed by: INTERNAL MEDICINE

## 2019-02-25 PROCEDURE — 93454 CORONARY ARTERY ANGIO S&I: CPT | Mod: XU | Performed by: INTERNAL MEDICINE

## 2019-02-25 DEVICE — STENT SYNERGY DRUG ELUTING 4.00X38MM H7493926038400: Type: IMPLANTABLE DEVICE | Status: FUNCTIONAL

## 2019-02-25 DEVICE — STENT SYNERGY DRUG ELUTING 3.50X38MM  H7493926038350: Type: IMPLANTABLE DEVICE | Status: FUNCTIONAL

## 2019-02-25 RX ORDER — LIDOCAINE 40 MG/G
CREAM TOPICAL
Status: DISCONTINUED | OUTPATIENT
Start: 2019-02-25 | End: 2019-02-25 | Stop reason: HOSPADM

## 2019-02-25 RX ORDER — ASPIRIN 81 MG/1
81 TABLET ORAL DAILY
Status: DISCONTINUED | OUTPATIENT
Start: 2019-02-26 | End: 2019-02-26 | Stop reason: HOSPADM

## 2019-02-25 RX ORDER — HEPARIN SODIUM 1000 [USP'U]/ML
INJECTION, SOLUTION INTRAVENOUS; SUBCUTANEOUS
Status: DISCONTINUED | OUTPATIENT
Start: 2019-02-25 | End: 2019-02-25 | Stop reason: HOSPADM

## 2019-02-25 RX ORDER — NITROGLYCERIN 0.4 MG/1
0.4 TABLET SUBLINGUAL EVERY 5 MIN PRN
Status: DISCONTINUED | OUTPATIENT
Start: 2019-02-25 | End: 2019-02-26 | Stop reason: HOSPADM

## 2019-02-25 RX ORDER — LORAZEPAM 0.5 MG/1
0.5 TABLET ORAL
Status: DISCONTINUED | OUTPATIENT
Start: 2019-02-25 | End: 2019-02-25 | Stop reason: HOSPADM

## 2019-02-25 RX ORDER — SODIUM CHLORIDE 9 MG/ML
INJECTION, SOLUTION INTRAVENOUS CONTINUOUS
Status: DISCONTINUED | OUTPATIENT
Start: 2019-02-25 | End: 2019-02-25 | Stop reason: HOSPADM

## 2019-02-25 RX ORDER — LORAZEPAM 2 MG/ML
0.5 INJECTION INTRAMUSCULAR
Status: DISCONTINUED | OUTPATIENT
Start: 2019-02-25 | End: 2019-02-25 | Stop reason: HOSPADM

## 2019-02-25 RX ORDER — SODIUM CHLORIDE 9 MG/ML
INJECTION, SOLUTION INTRAVENOUS CONTINUOUS
Status: ACTIVE | OUTPATIENT
Start: 2019-02-25 | End: 2019-02-25

## 2019-02-25 RX ORDER — POTASSIUM CHLORIDE 1500 MG/1
20 TABLET, EXTENDED RELEASE ORAL
Status: DISCONTINUED | OUTPATIENT
Start: 2019-02-25 | End: 2019-02-25 | Stop reason: HOSPADM

## 2019-02-25 RX ORDER — NALOXONE HYDROCHLORIDE 0.4 MG/ML
.1-.4 INJECTION, SOLUTION INTRAMUSCULAR; INTRAVENOUS; SUBCUTANEOUS
Status: DISCONTINUED | OUTPATIENT
Start: 2019-02-25 | End: 2019-02-26 | Stop reason: HOSPADM

## 2019-02-25 RX ORDER — NITROGLYCERIN 5 MG/ML
VIAL (ML) INTRAVENOUS
Status: DISCONTINUED | OUTPATIENT
Start: 2019-02-25 | End: 2019-02-25 | Stop reason: HOSPADM

## 2019-02-25 RX ORDER — PHENYLEPHRINE HCL IN 0.9% NACL 1 MG/10 ML
100 SYRINGE (ML) INTRAVENOUS ONCE
Status: COMPLETED | OUTPATIENT
Start: 2019-02-25 | End: 2019-02-25

## 2019-02-25 RX ORDER — FLUMAZENIL 0.1 MG/ML
0.2 INJECTION, SOLUTION INTRAVENOUS
Status: ACTIVE | OUTPATIENT
Start: 2019-02-25 | End: 2019-02-26

## 2019-02-25 RX ORDER — FENTANYL CITRATE 50 UG/ML
25-50 INJECTION, SOLUTION INTRAMUSCULAR; INTRAVENOUS
Status: ACTIVE | OUTPATIENT
Start: 2019-02-25 | End: 2019-02-26

## 2019-02-25 RX ORDER — VERAPAMIL HYDROCHLORIDE 2.5 MG/ML
INJECTION, SOLUTION INTRAVENOUS
Status: DISCONTINUED | OUTPATIENT
Start: 2019-02-25 | End: 2019-02-25 | Stop reason: HOSPADM

## 2019-02-25 RX ORDER — ROSUVASTATIN CALCIUM 20 MG/1
20 TABLET, COATED ORAL EVERY EVENING
Status: DISCONTINUED | OUTPATIENT
Start: 2019-02-25 | End: 2019-02-26 | Stop reason: HOSPADM

## 2019-02-25 RX ORDER — FENTANYL CITRATE 50 UG/ML
INJECTION, SOLUTION INTRAMUSCULAR; INTRAVENOUS
Status: DISCONTINUED | OUTPATIENT
Start: 2019-02-25 | End: 2019-02-25 | Stop reason: HOSPADM

## 2019-02-25 RX ORDER — NALOXONE HYDROCHLORIDE 0.4 MG/ML
.2-.4 INJECTION, SOLUTION INTRAMUSCULAR; INTRAVENOUS; SUBCUTANEOUS
Status: ACTIVE | OUTPATIENT
Start: 2019-02-25 | End: 2019-02-26

## 2019-02-25 RX ORDER — ACETAMINOPHEN 325 MG/1
325-650 TABLET ORAL EVERY 4 HOURS PRN
Status: DISCONTINUED | OUTPATIENT
Start: 2019-02-25 | End: 2019-02-25

## 2019-02-25 RX ORDER — ATROPINE SULFATE 0.1 MG/ML
0.5 INJECTION INTRAVENOUS EVERY 5 MIN PRN
Status: ACTIVE | OUTPATIENT
Start: 2019-02-25 | End: 2019-02-26

## 2019-02-25 RX ORDER — HYDROCODONE BITARTRATE AND ACETAMINOPHEN 5; 325 MG/1; MG/1
1-2 TABLET ORAL EVERY 4 HOURS PRN
Status: DISCONTINUED | OUTPATIENT
Start: 2019-02-25 | End: 2019-02-26 | Stop reason: HOSPADM

## 2019-02-25 RX ADMIN — Medication 2400 UNITS: at 07:07

## 2019-02-25 RX ADMIN — VERAPAMIL HYDROCHLORIDE 2.5 MG: 2.5 INJECTION, SOLUTION INTRAVENOUS at 14:15

## 2019-02-25 RX ADMIN — FENTANYL CITRATE 50 MCG: 50 INJECTION, SOLUTION INTRAMUSCULAR; INTRAVENOUS at 14:16

## 2019-02-25 RX ADMIN — ASPIRIN 325 MG: 325 TABLET, DELAYED RELEASE ORAL at 08:52

## 2019-02-25 RX ADMIN — NITROGLYCERIN 200 MCG: 5 INJECTION, SOLUTION INTRAVENOUS at 14:15

## 2019-02-25 RX ADMIN — LIDOCAINE HYDROCHLORIDE 1 ML: 10 INJECTION, SOLUTION INFILTRATION; PERINEURAL at 14:13

## 2019-02-25 RX ADMIN — Medication 500 MG: at 08:53

## 2019-02-25 RX ADMIN — ACETAMINOPHEN 650 MG: 325 TABLET, FILM COATED ORAL at 15:48

## 2019-02-25 RX ADMIN — HEPARIN SODIUM 4000 UNITS: 1000 INJECTION, SOLUTION INTRAVENOUS; SUBCUTANEOUS at 14:39

## 2019-02-25 RX ADMIN — MIDAZOLAM 1 MG: 1 INJECTION INTRAMUSCULAR; INTRAVENOUS at 14:35

## 2019-02-25 RX ADMIN — MIDAZOLAM 1 MG: 1 INJECTION INTRAMUSCULAR; INTRAVENOUS at 14:16

## 2019-02-25 RX ADMIN — Medication 500 MG: at 21:09

## 2019-02-25 RX ADMIN — NITROGLYCERIN 200 MCG: 5 INJECTION, SOLUTION INTRAVENOUS at 14:41

## 2019-02-25 RX ADMIN — SODIUM CHLORIDE: 9 INJECTION, SOLUTION INTRAVENOUS at 07:05

## 2019-02-25 RX ADMIN — MIDAZOLAM 1 MG: 1 INJECTION INTRAMUSCULAR; INTRAVENOUS at 14:11

## 2019-02-25 RX ADMIN — ROSUVASTATIN CALCIUM 20 MG: 20 TABLET, FILM COATED ORAL at 21:09

## 2019-02-25 RX ADMIN — HEPARIN SODIUM 4000 UNITS: 1000 INJECTION, SOLUTION INTRAVENOUS; SUBCUTANEOUS at 14:23

## 2019-02-25 RX ADMIN — METOPROLOL TARTRATE 12.5 MG: 25 TABLET, FILM COATED ORAL at 08:53

## 2019-02-25 RX ADMIN — NITROGLYCERIN 200 MCG: 5 INJECTION, SOLUTION INTRAVENOUS at 14:58

## 2019-02-25 RX ADMIN — Medication 100 MCG: at 14:41

## 2019-02-25 RX ADMIN — Medication 500 MG: at 08:52

## 2019-02-25 RX ADMIN — METOPROLOL TARTRATE 12.5 MG: 25 TABLET, FILM COATED ORAL at 21:09

## 2019-02-25 RX ADMIN — FENTANYL CITRATE 50 MCG: 50 INJECTION, SOLUTION INTRAMUSCULAR; INTRAVENOUS at 14:11

## 2019-02-25 RX ADMIN — HEPARIN SODIUM 5000 UNITS: 1000 INJECTION, SOLUTION INTRAVENOUS; SUBCUTANEOUS at 14:15

## 2019-02-25 RX ADMIN — HEPARIN SODIUM 1250 UNITS/HR: 10000 INJECTION, SOLUTION INTRAVENOUS at 04:22

## 2019-02-25 RX ADMIN — TICAGRELOR 180 MG: 90 TABLET ORAL at 14:42

## 2019-02-25 ASSESSMENT — ACTIVITIES OF DAILY LIVING (ADL)
ADLS_ACUITY_SCORE: 11

## 2019-02-25 ASSESSMENT — MIFFLIN-ST. JEOR: SCORE: 1740.33

## 2019-02-25 NOTE — PLAN OF CARE
Alert and oriented x4. VSS. Denies pain, SOB. Up independently. IVF running, heparin running at 1,400 units and hour. Tele SD with PAC's. Plan for angio today will continue to monitor.

## 2019-02-25 NOTE — PLAN OF CARE
A&O. Pt asymptomatic with low BP's (87/58) this AM, otherwise all VSS on room air. Tele: SB/SR, HR 55-70's. Denies CP and SOB overnight. PRN tylenol given w/ relief of neck pain. Independent in room. Heparin gtt increased to 14 mg/hr w/ heparin 10a recheck @ 1300. Plan for coronary angiogram today. Pt has been NPO since midnight. IVF to start @ 0700 AM.

## 2019-02-25 NOTE — PROCEDURES
Procedure  1)CAG  2) PCI proximal and mid RCA : overlapping 3.5 and 4.0 x 38 everolimus eluting Synergy stents    Indication NSTEMI  Findngs  LAD separate orifice. Atheromatous change with no focal stenosis  CX separate orifice. Mild atheromatous change no focal stenosis.  RCA dominant mid vessel occluded proximal 60% diffuse disease. Distal vessel fills via contra lateral collaterals.     We were able to traverse total occlusion with an 0.75 AL guide, 0.14  50 wire and a 2.0 x 12 balloon.We then dilated with a 3.5 noncompliant balloon and placed overlapping 3.5 and 4.0 x 38 mm everolimus eluting stents, post dilated to as high as 4.0 mm with excellent angiographic results, no loss of sidebranch and HILDA 3 flow.     Plan  1) asa 81 indefinitely  2) ticagrelor 90 bid  3) high potency statin  4) ideal bp control  5) Mediterranean style weight loss diet, exercise program    [place]

## 2019-02-25 NOTE — PROGRESS NOTES
[facility]    HOSPITALIST PROGRESS NOTE :   --------------------------------------------------    Date of Admission:  2/23/2019    Cumulative Summary: Andrei Mckenzie is a 53 year old male with past medical history significant for hyperlipidemia, family history of heart disease who was admitted with chest pain on December 23 with the concern for possible non-STEMI.  Patient had abnormal CT coronary angiogram in 2015.  Patient was found to have elevated troponin which peaked at around 9.39.  Echocardiogram was done which showed ejection fraction around 50% although he did have some inferior as well as inferior lateral hypokinesia at the base.  Cardiology was also consulted.  He was a started on heparin infusion, metoprolol tartrate, aspirin with the plan for undergoing coronary angiogram this morning.    Assessment & Plan     Active Problems:  Possible non-STEMI, with the concern for completed inferior MI three-vessel coronary artery calcification seen on coronary calcium scan 2015  Hyperlipidemia  Ongoing tobacco use  Family history of coronary artery disease    --Continue to monitor patient closely.  --Currently patient is n.p.o., awaiting to go for coronary angiogram.  --Continue patient on aspirin, heparin infusion and metoprolol 12.5 mg p.o. twice daily.  --Echocardiogram results were reviewed from this admission.  --Cardiology consultation, appreciate their help plan for angiogram later.  -- Angio results were reviewed later in the afternoon , patient was found to have single-vessel coronary artery disease involving occlusion of mid RCA culprit and was able to undergo successful PCI with 2 drug-eluting stents to the mid RCA.  --Patient is a started on aspirin and Brilinta 90 mg twice daily.  --Will follow up tomorrow if patient needs to be started on low-dose ACE inhibitor.  --High intensity statin.  --Cardiac rehab     Diet: NPO for Medical/Clinical Reasons Except for: Meds    Kumar Catheter: not  present  DVT Prophylaxis: heparin infusion   Code Status: Full Code    The patient's care was discussed with the Bedside Nurse and Patient.    Disposition Plan   Expected discharge: Tomorrow, recommended to prior living arrangement once ok to be discharged from cardiology point of view .  Entered: Valentina Johnson MD 02/25/2019, 11:40 AM       Valentina Johnson MD, FACP  Text Page (7am - 6pm)    ----------------------------------------------------------------------------------------------------------------------      Interval History   Patient care was assumed this morning, patient was seen and examined, currently is n.p.o., aware about plan for going for angiogram.  He is denying any chest pain or shortness of breath at this point.    -Data reviewed today: I reviewed all new labs and imaging results over the last 24 hours.    I personally reviewed no images or EKG's today.    Physical Exam   Temp: 99  F (37.2  C) Temp src: Oral BP: 93/60   Heart Rate: 67 Resp: 16 SpO2: 97 % O2 Device: None (Room air)    Vitals:    02/23/19 1603 02/24/19 0313 02/25/19 0411   Weight: 86.5 kg (190 lb 12.8 oz) 86.9 kg (191 lb 9.6 oz) 86.3 kg (190 lb 4.8 oz)     Vital Signs with Ranges  Temp:  [97.6  F (36.4  C)-99.3  F (37.4  C)] 99  F (37.2  C)  Heart Rate:  [62-70] 67  Resp:  [16-18] 16  BP: ()/(54-68) 93/60  SpO2:  [95 %-98 %] 97 %  I/O last 3 completed shifts:  In: 1485 [P.O.:1100; I.V.:385]  Out: 950 [Urine:950]    GENERAL: Alert , awake and oriented. NAD. Conversational, appropriate.   HEENT: Normocephalic. EOMI. No icterus or injection. Nares normal.   LUNGS: Clear to auscultation. No dyspnea at rest.   HEART: Regular rate. Extremities perfused.   ABDOMEN: Soft, nontender, and nondistended. Positive bowel sounds.   EXTREMITIES: No LE edema noted.   NEUROLOGIC: Moves extremities x4 on command. No acute focal neurologic abnormalities noted.     Medications     - MEDICATION INSTRUCTIONS -       HEParin 1,400 Units/hr (02/25/19 0705)      - MEDICATION INSTRUCTIONS -       - MEDICATION INSTRUCTIONS -       - MEDICATION INSTRUCTIONS -       sodium chloride 150 mL/hr at 02/25/19 0705       arginine  500 mg Oral BID     aspirin  325 mg Oral Daily     magnesium gluconate  500 mg Oral Daily     metoprolol tartrate  12.5 mg Oral BID     rosuvastatin  20 mg Oral QPM     sodium chloride (PF)  3 mL Intracatheter Q8H     sodium chloride (PF)  3 mL Intracatheter Q8H       Data   Recent Labs   Lab 02/25/19  0557 02/23/19  2152 02/23/19  1821 02/23/19  1413 02/22/19  1552   WBC  --   --  11.3* 12.3* 12.7*   HGB  --   --  12.9* 13.0* 13.2*   MCV  --   --  84 85 88   PLT  --   --  216 216 211   INR  --   --   --   --  1.06     --   --  139 138   POTASSIUM 4.2  --   --  3.8 3.6   CHLORIDE 108  --   --  106 104   CO2 25  --   --  26 28   BUN 15  --   --  14 17   CR 0.88  --   --  0.89 0.91   ANIONGAP 8  --   --  7 6   TERRY 8.9  --   --  9.2 8.7   GLC 87  --   --  86 100*   ALBUMIN  --   --   --   --  3.7   PROTTOTAL  --   --   --   --  7.6   BILITOTAL  --   --   --   --  0.7   ALKPHOS  --   --   --   --  68   ALT  --   --   --   --  51   AST  --   --   --   --  111*   LIPASE  --   --   --   --  156   TROPI  --  8.850* 9.393* 10.151* 11.717*       Imaging:   No results found for this or any previous visit (from the past 24 hour(s)).

## 2019-02-25 NOTE — PROGRESS NOTES
"Cardiology Progress Note  Zachary Wren         Assessment and Plan:      1. NSTEMI  Peak trop 10.1.  They are trending downwards.  Pt remains chest pain free.  On intravenous heparin.  Echo reveals inferior wall motion abnormality as well as inferoseptal hypokinesis.  RV appears to have mildly reduced function which may suggest involvement of right ventricle.  Blood pressures are low normal although that is where he runs usually.  He is on low-dose beta-blockers and aspirin.  Coronary angiography today.  EKG reveals T wave inversion in inferior leads.  Initially Q waves but now R wave height has improved in the inferior leads.    2.  Hyperlipidemia  LDL 97 on rosuvastatin 10 mg daily.  Will increase to 20.  Ideally, would like to increase to 40 but he is concerned about side effects.  In the past with high-dose Lipitor he had low back pain.    3.  Smoking cessation discussed and counseled.  He is using some nicotine gum.    Will need cardiac rehab.                     Interval History:   denies chest pain and denies shortness of breath          Review of Systems:   The 5 point Review of Systems is negative other than noted in the HPI          Physical Exam:        Blood pressure 93/60, pulse 62, temperature 99  F (37.2  C), temperature source Oral, resp. rate 16, height 1.803 m (5' 11\"), weight 86.3 kg (190 lb 4.8 oz), SpO2 97 %.  Vitals:    02/23/19 1603 02/24/19 0313 02/25/19 0411   Weight: 86.5 kg (190 lb 12.8 oz) 86.9 kg (191 lb 9.6 oz) 86.3 kg (190 lb 4.8 oz)     Weights since admission  Vitals:    02/23/19 1359 02/23/19 1603 02/24/19 0313 02/25/19 0411   Weight: 86.6 kg (191 lb) 86.5 kg (190 lb 12.8 oz) 86.9 kg (191 lb 9.6 oz) 86.3 kg (190 lb 4.8 oz)     Vital Signs with Ranges  Temp:  [97.6  F (36.4  C)-99.3  F (37.4  C)] 99  F (37.2  C)  Heart Rate:  [62-70] 67  Resp:  [16-18] 16  BP: ()/(54-68) 93/60  SpO2:  [95 %-98 %] 97 %  I/O's Last 24 hours  I/O last 3 completed shifts:  In: 1485 " [P.O.:1100; I.V.:385]  Out: 950 [Urine:950]  Net I/O since admission  02/20 0700 - 02/25 0659  In: 3615 [P.O.:2230; I.V.:885]  Out: 1400 [Urine:1400]  Net: 2215    EXAM:    Constitutional:   in no apparent distress     Neck:   elevated JVP, 8-10     Lungs:   symmetric, clear to auscultation     Cardiovascular:   normal S1 and S2, S4     Extremities and Back:   No edema     Neurological:   No gross or focal neurologic abnormalities            Medications:          arginine  500 mg Oral BID     aspirin  325 mg Oral Daily     magnesium gluconate  500 mg Oral Daily     metoprolol tartrate  12.5 mg Oral BID     rosuvastatin  10 mg Oral QPM     sodium chloride (PF)  3 mL Intracatheter Q8H     sodium chloride (PF)  3 mL Intracatheter Q8H            Data:      All new lab and imaging data was reviewed.   Recent Labs   Lab Test 02/23/19 1821 02/23/19  1413 02/22/19  1552   WBC 11.3* 12.3* 12.7*   HGB 12.9* 13.0* 13.2*   MCV 84 85 88    216 211   INR  --   --  1.06      Recent Labs   Lab Test 02/25/19  0557 02/23/19  1413 02/22/19  1552    139 138   POTASSIUM 4.2 3.8 3.6   CHLORIDE 108 106 104   CO2 25 26 28   BUN 15 14 17   CR 0.88 0.89 0.91   ANIONGAP 8 7 6   TERRY 8.9 9.2 8.7   GLC 87 86 100*     Recent Labs   Lab Test 02/23/19 2152 02/23/19  1821 02/23/19  1413   TROPI 8.850* 9.393* 10.151*            Imaging:   No results found for this or any previous visit (from the past 24 hour(s)).

## 2019-02-26 ENCOUNTER — APPOINTMENT (OUTPATIENT)
Dept: OCCUPATIONAL THERAPY | Facility: CLINIC | Age: 54
End: 2019-02-26
Attending: INTERNAL MEDICINE
Payer: COMMERCIAL

## 2019-02-26 VITALS
BODY MASS INDEX: 26.78 KG/M2 | DIASTOLIC BLOOD PRESSURE: 79 MMHG | HEIGHT: 71 IN | RESPIRATION RATE: 17 BRPM | OXYGEN SATURATION: 92 % | SYSTOLIC BLOOD PRESSURE: 115 MMHG | WEIGHT: 191.3 LBS | TEMPERATURE: 99.3 F | HEART RATE: 65 BPM

## 2019-02-26 LAB
ANION GAP SERPL CALCULATED.3IONS-SCNC: 9 MMOL/L (ref 3–14)
BUN SERPL-MCNC: 11 MG/DL (ref 7–30)
CALCIUM SERPL-MCNC: 8.9 MG/DL (ref 8.5–10.1)
CHLORIDE SERPL-SCNC: 106 MMOL/L (ref 94–109)
CO2 SERPL-SCNC: 24 MMOL/L (ref 20–32)
CREAT SERPL-MCNC: 0.73 MG/DL (ref 0.66–1.25)
ERYTHROCYTE [DISTWIDTH] IN BLOOD BY AUTOMATED COUNT: 14.1 % (ref 10–15)
GFR SERPL CREATININE-BSD FRML MDRD: >90 ML/MIN/{1.73_M2}
GLUCOSE SERPL-MCNC: 97 MG/DL (ref 70–99)
HCT VFR BLD AUTO: 36.8 % (ref 40–53)
HGB BLD-MCNC: 12.1 G/DL (ref 13.3–17.7)
INTERPRETATION ECG - MUSE: NORMAL
MCH RBC QN AUTO: 27.6 PG (ref 26.5–33)
MCHC RBC AUTO-ENTMCNC: 32.9 G/DL (ref 31.5–36.5)
MCV RBC AUTO: 84 FL (ref 78–100)
PLATELET # BLD AUTO: 254 10E9/L (ref 150–450)
POTASSIUM SERPL-SCNC: 3.9 MMOL/L (ref 3.4–5.3)
RBC # BLD AUTO: 4.38 10E12/L (ref 4.4–5.9)
SODIUM SERPL-SCNC: 139 MMOL/L (ref 133–144)
WBC # BLD AUTO: 8.8 10E9/L (ref 4–11)

## 2019-02-26 PROCEDURE — 80048 BASIC METABOLIC PNL TOTAL CA: CPT | Performed by: INTERNAL MEDICINE

## 2019-02-26 PROCEDURE — 97110 THERAPEUTIC EXERCISES: CPT | Mod: GO | Performed by: OCCUPATIONAL THERAPIST

## 2019-02-26 PROCEDURE — 25000132 ZZH RX MED GY IP 250 OP 250 PS 637: Performed by: INTERNAL MEDICINE

## 2019-02-26 PROCEDURE — 85027 COMPLETE CBC AUTOMATED: CPT | Performed by: INTERNAL MEDICINE

## 2019-02-26 PROCEDURE — 99232 SBSQ HOSP IP/OBS MODERATE 35: CPT | Performed by: INTERNAL MEDICINE

## 2019-02-26 PROCEDURE — 36415 COLL VENOUS BLD VENIPUNCTURE: CPT | Performed by: INTERNAL MEDICINE

## 2019-02-26 PROCEDURE — 97165 OT EVAL LOW COMPLEX 30 MIN: CPT | Mod: GO | Performed by: OCCUPATIONAL THERAPIST

## 2019-02-26 PROCEDURE — 97535 SELF CARE MNGMENT TRAINING: CPT | Mod: GO | Performed by: OCCUPATIONAL THERAPIST

## 2019-02-26 PROCEDURE — 99238 HOSP IP/OBS DSCHRG MGMT 30/<: CPT | Performed by: INTERNAL MEDICINE

## 2019-02-26 RX ORDER — NITROGLYCERIN 0.4 MG/1
TABLET SUBLINGUAL
Qty: 30 TABLET | Refills: 0 | Status: SHIPPED | OUTPATIENT
Start: 2019-02-26 | End: 2019-05-01

## 2019-02-26 RX ORDER — ROSUVASTATIN CALCIUM 20 MG/1
20 TABLET, COATED ORAL EVERY EVENING
Qty: 30 TABLET | Refills: 0 | Status: SHIPPED | OUTPATIENT
Start: 2019-02-26 | End: 2019-09-30

## 2019-02-26 RX ORDER — METOPROLOL TARTRATE 25 MG/1
12.5 TABLET, FILM COATED ORAL 2 TIMES DAILY
Qty: 30 TABLET | Refills: 0 | Status: SHIPPED | OUTPATIENT
Start: 2019-02-26 | End: 2019-03-25 | Stop reason: DRUGHIGH

## 2019-02-26 RX ADMIN — METOPROLOL TARTRATE 12.5 MG: 25 TABLET, FILM COATED ORAL at 08:24

## 2019-02-26 RX ADMIN — Medication 500 MG: at 08:24

## 2019-02-26 RX ADMIN — ASPIRIN 81 MG: 81 TABLET, COATED ORAL at 08:24

## 2019-02-26 RX ADMIN — TICAGRELOR 90 MG: 90 TABLET ORAL at 03:35

## 2019-02-26 ASSESSMENT — ACTIVITIES OF DAILY LIVING (ADL): PREVIOUS_RESPONSIBILITIES: DRIVING;FINANCES;MEDICATION MANAGEMENT;YARDWORK;SHOPPING;LAUNDRY;HOUSEKEEPING;MEAL PREP

## 2019-02-26 ASSESSMENT — MIFFLIN-ST. JEOR: SCORE: 1744.86

## 2019-02-26 NOTE — PLAN OF CARE
VSS. Monitor remains Sinus rhythm. Pt. Denies pain. Right radial site stable. Pt. Discharged to home per order. Discharge instructions reviewed with pt. Transportation per friend.

## 2019-02-26 NOTE — PROGRESS NOTES
02/26/19 0947   Quick Adds   Type of Visit Initial Occupational Therapy Evaluation   Living Environment   Lives With alone   Living Arrangements house   Home Accessibility stairs within home   Transportation Anticipated car, drives self   Self-Care   Regular Exercise (walk dog daily, approx min 1.2 miles)   Functional Level   Ambulation 0-->independent   Transferring 0-->independent   Toileting 0-->independent   Bathing 0-->independent   Dressing 0-->independent   Eating 0-->independent   Communication 0-->understands/communicates without difficulty   Swallowing 0-->swallows foods/liquids without difficulty   Cognition 0 - no cognition issues reported   Fall history within last six months no   Prior Functional Level Comment Pt does not work on MA. ETOH abuse in the past, quit drinking 2013.   General Information   Onset of Illness/Injury or Date of Surgery - Date 02/23/19   Referring Physician Robby Chapa MD   Patient/Family Goals Statement home   Additional Occupational Profile Info/Pertinent History of Current Problem NSTEMI, s/p angio PCI with JAE to mid to distal RCA and JAE overlapping mid to proximal RCA.   Precautions/Limitations (MI precautions)   Heart Disease Risk Factors Dislipidemia;High blood pressure;Family history;Gender;Smoking   Cognitive Status Examination   Orientation orientation to person, place and time   Level of Consciousness alert   Follows Commands (Cognition) WNL   Memory intact   Organization/Problem Solving No deficits were identified   Executive Function No deficits were identified   Sensory Examination   Sensory Quick Adds No deficits were identified   Pain Assessment   Patient Currently in Pain No   Transfer Skills   Transfer Comments Pt I with ADLs and functional mobility.    Instrumental Activities of Daily Living (IADL)   Previous Responsibilities driving;finances;medication management;yardwork;shopping;laundry;housekeeping;meal prep   Activities of Daily  "Living Analysis   Impairments Contributing to Impaired Activities of Daily Living post surgical precautions  (MI precautions)   General Therapy Interventions   Planned Therapy Interventions progressive activity/exercise;home program guidelines;risk factor education   Clinical Impression   Criteria for Skilled Therapeutic Interventions Met yes, treatment indicated   OT Diagnosis decreased IADL's   Influenced by the following impairments MI precautions   Assessment of Occupational Performance 1-3 Performance Deficits   Identified Performance Deficits impaired Heavier IADL's ie snowblowing/shoveling, vacuumting, etc   Clinical Decision Making (Complexity) Low complexity   Therapy Frequency (Eval and treatment only)   Anticipated Discharge Disposition Home with Outpatient Therapy  (OP CR at [facility] and  with heavier IADl's,)   Risks and Benefits of Treatment have been explained. Yes   Patient, Family & other staff in agreement with plan of care Yes   [facility] AM-PAC  \"6 Clicks\" Daily Activity Inpatient Short Form   1. Putting on and taking off regular lower body clothing? 4 - None   2. Bathing (including washing, rinsing, drying)? 4 - None   3. Toileting, which includes using toilet, bedpan or urinal? 4 - None   4. Putting on and taking off regular upper body clothing? 4 - None   5. Taking care of personal grooming such as brushing teeth? 4 - None   6. Eating meals? 4 - None   Daily Activity Raw Score (Score out of 24.Lower scores equate to lower levels of function) 24   Total Evaluation Time   Total Evaluation Time (Minutes) 10     "

## 2019-02-26 NOTE — PLAN OF CARE
A/O, up independently in room. VSS on RA. Tele: SR. Pt denies pain/SOB. Right radial site soft, C/D/I, CMS intact. Plan for cardiac rehab.

## 2019-02-26 NOTE — PROGRESS NOTES
[facility]    Cardiology Progress Note    Date of Service: 02/26/2019     Assessment & Plan   Andrei Mckenzie is a 53 year old male with past medical history of tobacco abuse, mild dyslipidemia,+CT calcium score 2015 at [facility]  This patient was admitted on 2/23/2019 with symptoms of chest pain earlier last week with troponin elevation and completed infarct.    1. NSTEMI  Peak trop 10.1.   Echo reveals inferior wall motion abnormality as well as inferoseptal hypokinesis.  RV appears to have mildly reduced function which may suggest involvement of right ventricle.    Blood pressures are low normal although that is where he runs usually.    He is on low-dose beta-blockers and aspirin.   EKG reveals T wave inversion in inferior leads.  Initially Q waves but now R wave height has improved in the inferior leads.  S/p angiogram yesterday:  -40% mid LAD  - 60% prox RCA and then occluded mid and 90% distal  3.5 X 38mm SYNERGY maite mid to distal RCA; 4.) X 39mm SYNERGYdes overlapping in mid rca/prox rca  -ef 50-55%   -low  Grade temp 99.3  BP soft--only on low dose metoprolol  Brilinta/ASA    2.  Hyperlipidemia  LDL 97 on rosuvastatin 10 mg daily.  Will increase to 20.  Ideally, would like to increase to 40 but he is concerned about side effects.  In the past with high-dose Lipitor he had low back pain.  -will need follow up lipid and consider Zetia if LDL not less than 70     3.  Smoking cessation discussed and counseled.  He is using some nicotine gum.     4. Dilated ascending aorta to 3.9cm  -continue beta-blockers    Follow up [facility]  Home today after rehab  OP cardiac rehab  No snow blowing or snow removal for minimum 2 weeks      Percy Hicks, MSN, APRN, CNP  [facility]    Interval History   No chest pain    Review of Systems:  The Review of Systems is negative other than noted in the HPI    Physical Exam   Temp: 98.9  F (37.2  C) Temp src: Oral BP: 102/63 Pulse: 65 Heart Rate: 72 Resp: 16 SpO2:  96 % O2 Device: None (Room air)    Vitals:    02/24/19 0313 02/25/19 0411 02/26/19 0110   Weight: 86.9 kg (191 lb 9.6 oz) 86.3 kg (190 lb 4.8 oz) 86.8 kg (191 lb 4.8 oz)     Vital Signs with Ranges  Temp:  [98  F (36.7  C)-99.8  F (37.7  C)] 98.9  F (37.2  C)  Pulse:  [53-78] 65  Heart Rate:  [55-84] 72  Resp:  [7-26] 16  BP: ()/(63-79) 102/63  SpO2:  [96 %-98 %] 96 %  I/O last 3 completed shifts:  In: 2672 [P.O.:1030; I.V.:1642]  Out: 1650 [Urine:1650]    Constitutional     alert and oriented, in no acute distress.     Skin     warm and dry to touch    ENT     no pallor or cyanosis    Neck    Supple, JVP normal, no carotid bruit    Chest     no tenderness to palpation     Lungs  clear to auscultation     Cardiac  regular rhythm, S1 normal, S2 normal, No S3 or S4, no murmurs, no rubs    Abdomen     abdomen soft, bowel sounds normoactive, no hepatosplenomegaly    Extremities and Back     no clubbing, cyanosis. No edema observed.  Right radial site mildly edematous and ecchymosis. No hum or bruit      Neurological     no gross motor deficits noted, affect appropriate, oriented to time, person and place.        Medications     Continuing ACE inhibitor/ARB/ARNI from home medication list OR ACE inhibitor/ARB order already placed during this visit       - MEDICATION INSTRUCTIONS -       - MEDICATION INSTRUCTIONS -       - MEDICATION INSTRUCTIONS -       - MEDICATION INSTRUCTIONS -       - MEDICATION INSTRUCTIONS -       Percutaneous Coronary Intervention orders placed (this is information for BPA alerting)         arginine  500 mg Oral BID     aspirin  81 mg Oral Daily     magnesium gluconate  500 mg Oral Daily     metoprolol tartrate  12.5 mg Oral BID     rosuvastatin  20 mg Oral QPM     sodium chloride (PF)  3 mL Intracatheter Q8H     ticagrelor  90 mg Oral Q12H          Data:     ROUTINE IP LABS (Last four results)  BMP  Recent Labs   Lab 02/26/19  0532 02/25/19  0557 02/23/19  1413 02/22/19  1552    141  139 138   POTASSIUM 3.9 4.2 3.8 3.6   CHLORIDE 106 108 106 104   TERRY 8.9 8.9 9.2 8.7   CO2 24 25 26 28   BUN 11 15 14 17   CR 0.73 0.88 0.89 0.91   GLC 97 87 86 100*     CHOLESTEROL/HEPATIC  Recent Labs   Lab 02/25/19  0557 02/23/19  1821 02/22/19  1552   CHOL 155 160  --    TRIG 133 114  --    LDL 98 97  --    HDL 30* 40  --    ALT  --   --  51   AST  --   --  111*     CBC  Recent Labs   Lab 02/26/19  0532 02/23/19  1821 02/23/19  1413 02/22/19  1552   WBC 8.8 11.3* 12.3* 12.7*   RBC 4.38* 4.54 4.54 4.58   HGB 12.1* 12.9* 13.0* 13.2*   HCT 36.8* 38.3* 38.6* 40.4   MCV 84 84 85 88   MCH 27.6 28.4 28.6 28.8   MCHC 32.9 33.7 33.7 32.7   RDW 14.1 14.3 14.7 14.5    216 216 211     TROP:   Recent Labs   Lab 02/23/19  2152 02/23/19  1821 02/23/19  1413 02/22/19  1552   TROPI 8.850* 9.393* 10.151* 11.717*      BNP:    Recent Labs   Lab 02/24/19  0657   NTBNPI 1,253*     INR  Recent Labs   Lab 02/22/19  1552   INR 1.06     TSH   Date Value Ref Range Status   02/23/2019 1.12 0.40 - 4.00 mU/L Final       EKG results:  Reviewed if available     Imaging:  No results found for this or any previous visit (from the past 24 hour(s)).  Telemetry:  Sinus alma

## 2019-02-26 NOTE — DISCHARGE SUMMARY
[facility]  Hospitalist Discharge Summary       Date of Admission:  2/23/2019  Date of Discharge:  2/26/2019  Discharging Provider: Valentina Johnson MD    Discharge Diagnoses :    Non-STEMI, status post angioplasty.  History of tobacco abuse.  Mild dyslipidemia.    Follow-ups Needed After Discharge     Unresulted Labs Ordered in the Past 30 Days of this Admission     No orders found from 12/25/2018 to 2/24/2019.      These results will be followed up by PCP    Discharge Disposition   Discharged to home  Condition at discharge: Stable    Hospital Course   Andrei Mckenzie is a 53 year old male with past medical history of tobacco abuse, mild dyslipidemia,+CT calcium score 2015 at [facility].This patient was admitted on 2/23/2019 with symptoms of chest pain with troponin elevation and completed infarct.here are further details regarding his hospitalization:     1. NSTEMI  Peak trop 10.1.   Echo revealed inferior wall motion abnormality as well as inferoseptal hypokinesis.  RV appears to have mildly reduced function which may suggest involvement of right ventricle.    Blood pressures are low normal although that is where he runs usually.    He is on low-dose beta-blockers and aspirin.   EKG reveals T wave inversion in inferior leads.  Initially Q waves but now R wave height has improved in the inferior leads.  S/p angiogram yesterday:  -40% mid LAD  - 60% prox RCA and then occluded mid and 90% distal  3.5 X 38mm SYNERGY maite mid to distal RCA; 4.) X 39mm SYNERGYdes overlapping in mid rca/prox rca  -ef 50-55%   -low  Grade temp 99.3  BP soft--only on low dose metoprolol  He will be discharged on Brilinta/ASA and low dose metoprolol with possibly adding ace inh as an out patient with close monitoring of BP.     2.  Hyperlipidemia  LDL 97 on rosuvastatin 10 mg daily.  Will increase to 20.  Ideally, would like to increase to 40 but he is concerned about side effects.  In the past with high-dose Lipitor he had low back  pain.  -will need  Out patient follow up lipid and consider Zetia if LDL not less than 70     3.  Smoking cessation discussed and counseled.  He is using some nicotine gum.     4. Dilated ascending aorta to 3.9cm  -continue beta-blockers     Follow up [facility]  Home today after rehab  OP cardiac rehab  No snow blowing or snow removal for minimum 2 weeks    Patient was seen and examined on the day of discharge , he is feeling well, does not have any complaints , I did review the discharge medications and instructions with the patient and plan for him to follow up with the PCP after the hospitalization .patient was in agreement , he is discharged in stable condition back to his home.    Consultations This Hospital Stay   PHARMACY TO DOSE HEPARIN  SMOKING CESSATION PROGRAM IP CONSULT  CARDIAC REHAB IP CONSULT  CARDIOLOGY IP CONSULT  PHARMACY TO DOSE HEPARIN  SPIRITUAL HEALTH SERVICES IP CONSULT  NUTRITION SERVICES ADULT IP CONSULT  CARDIAC REHAB IP CONSULT  PHARMACY IP CONSULT  PHARMACY IP CONSULT  PHARMACY LIAISON FOR MEDICATION COVERAGE CONSULT  CARE TRANSITION RN/SW IP CONSULT  SMOKING CESSATION PROGRAM IP CONSULT  SMOKING CESSATION PROGRAM IP CONSULT    Code Status   Full Code    Time Spent on this Encounter   I, Valentina Johnson, personally saw the patient today and spent less than or equal to 30 minutes discharging this patient.       Valentina Johnson MD  [facility]  ______________________________________________________________________    Physical Exam   Vital Signs: Temp: 99.3  F (37.4  C) Temp src: Oral BP: (!) 96/92 Pulse: 65 Heart Rate: 67 Resp: 17 SpO2: 97 % O2 Device: None (Room air)    Weight: 191 lbs 4.8 oz    GENERAL: Alert , awake and oriented. NAD. Conversational, appropriate.   HEENT: Normocephalic. EOMI. No icterus or injection. Nares normal.   LUNGS: Clear to auscultation. No dyspnea at rest.   HEART: Regular rate. Extremities perfused.   ABDOMEN: Soft, nontender, and nondistended. Positive  bowel sounds.   EXTREMITIES: No LE edema noted.   NEUROLOGIC: Moves extremities x4 on command. No acute focal neurologic abnormalities noted.        Primary Care Physician   Joel Santacruz    Immunizations       Discharge Orders      Basic metabolic panel     Lipid Profile     ALT    Last Lab Result: ALT (U/L)       Date                     Value                 02/22/2019               51               ----------     CARDIAC REHAB REFERRAL      Follow-Up with Cardiologist      Follow-Up with Cardiac Advanced Practice Provider      Reason for your hospital stay    You were admitted to the hospital secondary to chest pain.     Follow-up and recommended labs and tests    Follow up with primary care provider, Joel Santacruz, within 7 days to evaluate treatment change and for hospital follow- up.  No follow up labs or test are needed.     Activity    Your activity upon discharge: activity as tolerated     Discharge Instructions    Please continue to take ASA and Brilinta, Brilinta will help keep your stents open.  Your Crestor dose is increased to 20 mg po daily.  You are also discharged on metoprolol 12.5 mg po BID   you are also given script for nitroglycerine that you can take as needed for chest pain.  Please do not attempt snow removal for 2 weeks at least as recommended by cardiology     Full Code     Diet    Follow this diet upon discharge: Orders Placed This Encounter      Combination Diet Low Saturated Fat Na <2400mg Diet         Significant Results and Procedures   Results for orders placed or performed during the hospital encounter of 02/22/19   XR Chest 2 Views    Narrative    CHEST TWO VIEWS  2/22/2019 4:21 PM     HISTORY: 53-year-old patient with chest pain.       Impression    IMPRESSION: Since August 24, 2013, heart size is normal. No pleural  effusion, pneumothorax, or abnormal area of consolidation.    APARNA WILSON MD       Discharge Medications   Current Discharge Medication List      START  taking these medications    Details   metoprolol tartrate (LOPRESSOR) 25 MG tablet Take 0.5 tablets (12.5 mg) by mouth 2 times daily  Qty: 30 tablet, Refills: 0    Associated Diagnoses: NSTEMI (non-ST elevated myocardial infarction) (H); ACS (acute coronary syndrome) (H)      nitroGLYcerin (NITROSTAT) 0.4 MG sublingual tablet For chest pain place 1 tablet under the tongue every 5 minutes for 3 doses. If symptoms persist 5 minutes after 1st dose call 911.  Qty: 30 tablet, Refills: 0    Associated Diagnoses: NSTEMI (non-ST elevated myocardial infarction) (H); ACS (acute coronary syndrome) (H)      ticagrelor (BRILINTA) 90 MG tablet Take 1 tablet (90 mg) by mouth every 12 hours  Qty: 180 tablet, Refills: 3    Associated Diagnoses: NSTEMI (non-ST elevated myocardial infarction) (H); ACS (acute coronary syndrome) (H)         CONTINUE these medications which have CHANGED    Details   rosuvastatin (CRESTOR) 20 MG tablet Take 1 tablet (20 mg) by mouth every evening  Qty: 30 tablet, Refills: 0    Associated Diagnoses: NSTEMI (non-ST elevated myocardial infarction) (H); ACS (acute coronary syndrome) (H)         CONTINUE these medications which have NOT CHANGED    Details   arginine 500 MG capsule Take 1 capsule (500 mg) by mouth 2 times daily  Qty: 60 capsule, Refills: 0      aspirin 81 MG tablet Take 1 tablet (81 mg) by mouth daily      cholecalciferol (VITAMIN D3) 5000 units (125 mcg) capsule Take 5,000 Units by mouth every evening      Cholecalciferol (VITAMIN D3) 5000 UNITS TBDP Take 10,000 Units by mouth daily       citrulline (CITRULLINE) 600 MG CAPS Take 1 capsule (600 mg) by mouth 2 times daily  Qty: 60 capsule, Refills: 0      coenzyme Q-10 100 MG CAPS Take 200 mg by mouth 2 times daily  Qty: 120 capsule, Refills: 0      ketotifen (ZADITOR/REFRESH ANTI-ITCH) 0.025 % ophthalmic solution 1 drop daily as needed for itching      Magnesium Citrate 100 MG TABS Take 400 mg by mouth daily   Qty: 60 tablet, Refills: 0       Menaquinone-7 100 MCG CAPS Take 500 mcg by mouth daily   Qty: 30 capsule, Refills: 0      Multiple Vitamins-Minerals (MULTIVITAL PO) Take 1 tablet by mouth daily      Saw Palmetto, Serenoa repens, (SAW PALMETTO PO) Take 1 tablet by mouth daily           Allergies   Allergies   Allergen Reactions     Fenofibrate      He has muscle aches.     Hmg-Coa-R Inhibitors      He did not tolerate.     Mildew      Mold      Penicillins Nausea and Vomiting

## 2019-02-26 NOTE — PLAN OF CARE
VSS. Monitor remains Sinus rhythm. Tylenol given for headache. Pt. Denies chest pain. Right radial site stable. CMS good. Continue to monitor.

## 2019-02-26 NOTE — PLAN OF CARE
Discharge Planner OT   Patient plan for discharge: home  Current status: Eval completed and treatment initiated. Pt lives in a house alone and reports I with ADL/IADL's and mobility, pt is currently [employer] and does not work. Pt admitted due to NSTEMI, s/p angio and PCI with JAE x2 to RCA. Pt completed TDM with gradual increase to 1.7 mph for 13 mins and completed 15 stairs with no reported symptoms and stable CV response, see vital sign flow sheet for details. Pt aware of CAD risk factors and what he needs to do with quitting smoking and already follows a plant based diet. Pt unsure if he'll be able to quit smoking, but knows what he needs to do.   Barriers to return to prior living situation: none  Recommendations for discharge: home with A with heavy IADL's ie snow blowing, shoveling. Etc and OP CR at [facility]  Rationale for recommendations: OP Cr for monitored progressive exercise and risk factor education and modification.        Entered by: Hannah Guerra 02/26/2019 11:02 AM     Occupational Therapy Discharge Summary    Reason for therapy discharge:    Discharged to home with outpatient therapy.    Progress towards therapy goal(s). See goals on Care Plan in [facility] electronic health record for goal details.  Goals met    Therapy recommendation(s):    Continued therapy is recommended.  Rationale/Recommendations:  home with A with heavier IADL's ie snowblowing/shoveling and oP CR at [facility] for monitored progressive exercise and risk factor education and modification. .

## 2019-02-26 NOTE — CONSULTS
Care Coordination:      Mar 05, 2019 12:00 PM CST  LAB with [facility] ([facility]) [address] [facility] [address]  [phone]   Please do not eat 10-12 hours before your appointment if you are coming in fasting for labs on lipids, cholesterol, or glucose (sugar). This does not apply to pregnant women. Water, hot tea and black coffee (with nothing added) are okay. Do not drink other fluids, diet soda or chew gum.       Mar 05, 2019  1:30 PM CST  Return Visit with Cyndy Hooker PA-C  [facility] ([facility]) [address] [address]  [phone]         Emma Rodriguez RN BSN  [facility] Care Coordinator  Phone [phone]

## 2019-02-26 NOTE — CONSULTS
Medication coverage check for Brilinta. Monthly copay is $0.  Terrie Arias CphT  [facility] Discharge Pharmacy Liaison  Liaison Cell: [phone]

## 2019-02-26 NOTE — DISCHARGE INSTRUCTIONS
Discharge Instructions for Coronary Angioplasty and Stenting  During your angioplasty, a doctor inserts a thin tube called a catheter into a blood vessel in your groin or wrist. The catheter is pushed through your blood vessel to a blocked area in one of your heart s arteries. The doctor inflates a tiny balloon at the tip of the catheter and stretches the blocked vessel so blood can flow freely. The balloon is then deflated and removed with the catheter. The doctor may also insert a metal mesh tube called a stent in the blocked vessel. The stent helps the vessel stay open. You may get several stents if you have blockages in more than one of your arteries.  Home care    Ask someone to drive you to your appointments for the next few days.    Rest for 2 to 3 days after the procedure. Most people are able to go back to normal activity within a few days.    Take your temperature and check your incision for signs of infection every day for a week. Signs of infection include redness, swelling, drainage, or warmth. It is normal to have a small bruise or bump where the catheter was inserted.    Take your medicines exactly as directed. Don t skip doses. It is important to take aspirin or other similar medicines for as long as your doctor advises. If you were also prescribed clopidogrel, prasugrel, or ticagrelor, it is very important to take these medicines, as well. These medicines prevent clots that could cause a heart attack. If you have a problem with any of your medicines, call healthcare provider right away. Call your provider right away if you have extra bleeding, but go to the emergency room if the bleeding can't be controlled.    Unless told otherwise, drink plenty of fluids to help flush your body of the dye that was used during your angioplasty. Let your healthcare provider know if the color of your urine changes and doesn't return to normal color.    Eat a healthy diet that is low in fat, salt, and cholesterol.  Ask your healthcare team for menus and other diet information.    Exercise according to your healthcare team's recommendation. Depending on your case, your team may recommend you start a cardiac rehabilitation program. Cardiac rehab is an exercise program in which trained healthcare staff monitor your progress and stress on your heart while you exercise. Ask how to enroll if your team recommends this program.    Don't swim or take a bath for 5 to 7 days. You may shower the day after the procedure. This keeps the incision site from getting wet and infected until the skin and artery can heal.  Follow-up care    Make a follow-up appointment as directed by our staff. Follow-up appointments are usually scheduled for 2 to 4 weeks after an angioplasty or coronary stent procedure.    Have a yearly checkup to make sure you are still doing well and not having any new symptoms.    Don't wait for a follow-up appointment if your medicines are not working or you are having heart-related symptoms.     When to call your healthcare provider  Call your healthcare provider right away if you have any of the following:    Chest pain or a return of the symptoms you had prior to the angioplasty    Constant or increasing pain or numbness in your leg, or if your leg looks blue or feels cold    Fever above 100.4 F (38.0 C) or other signs of infection (redness, swelling, drainage, or warmth at the incision site of the leg or wrist)    Shortness of breath    Bleeding, bruising, or a large swelling where the catheter (tube) was inserted    Blood in your urine    Black or tarry stools    Feeling faint    Difficulty speaking or weakness in any muscle   Date Last Reviewed: 10/1/2016    9472-4648 [employer]. [address] [place] [address]83. All rights reserved. This information is not intended as a substitute for professional medical care. Always follow your healthcare professional's instructions.

## 2019-02-27 ENCOUNTER — PATIENT OUTREACH (OUTPATIENT)
Dept: CARE COORDINATION | Facility: CLINIC | Age: 54
End: 2019-02-27

## 2019-02-27 ENCOUNTER — TELEPHONE (OUTPATIENT)
Dept: CARDIOLOGY | Facility: CLINIC | Age: 54
End: 2019-02-27

## 2019-02-27 ASSESSMENT — ACTIVITIES OF DAILY LIVING (ADL): DEPENDENT_IADLS:: INDEPENDENT

## 2019-02-27 NOTE — LETTER
Health Care Home - Access Care Plan    About Me  Patient Name:  Andrei Mckenzie    YOB: 1965  Age:                             53 year old   [place] MRN:            [id number] Telephone Information:   Home Phone [phone]   Mobile [phone]       Address:    [address] [place] [address] Email address:  [email or web address]@"Hey, Neighbor!"      Emergency Contact(s)  Name Relationship Lgl Grd Work Phone Home Phone Mobile Phone   MICKY DRIVER Friend   [phone]               My Access Plan  Medical Emergency 911   Questions or concerns during clinic hours Primary Clinic Line, I will call the clinic directly: [facility] - [phone]   24 Hour Appointment Line [phone] or  [phone]) (toll free)   24 Hour Nurse Line [phone] (toll free)   Questions or concerns outside clinic hours 24 Hour Appointment Line, I will call the after-hours on-call line:   [facility] [phone] or [phone]) (toll-free)   Preferred Urgent Care [facility], [phone]   Preferred Hospital [place], [place]  [phone]   Preferred Pharmacy [facility] - [place], [place] - [address]      Behavioral Health Crisis Line The National Suicide Prevention Lifeline at 1-619.436.8641 or 911     My Care Team Members  Patient Care Team       Relationship Specialty Notifications Start End    Joel Santacruz MD PCP - General   3/1/07     Phone: [phone] Fax: [phone]         [address] [facility] [address]    Joel Santacruz MD PCP - Assigned PCP   5/1/11     Phone: [phone] Fax: [phone]         [address] [facility] [address]    Lupe Mcgraw, RN Clinic Care Coordinator Primary Care - CC Admissions 2/27/19     Phone: [phone] Fax: [phone]

## 2019-02-27 NOTE — PROGRESS NOTES
Clinic Care Coordination Contact    Clinic Care Coordination Contact  OUTREACH    Referral Information:  Referral Source: IP Report    Primary Diagnosis: Acute MI (heart attack)    Chief Complaint   Patient presents with     Clinic Care Coordination - Post Hospital     RN        [place] Utilization:   Clinic Utilization  Difficulty keeping appointments:: No  Compliance Concerns: Yes(left ED AMA on 2/22/19)  No-Show Concerns: No  No PCP office visit in Past Year: No  Utilization    Last refreshed: 2/27/2019  9:05 AM:  Hospital Admissions 1           Last refreshed: 2/27/2019  9:05 AM:  ED Visits 1           Last refreshed: 2/27/2019  9:05 AM:  No Show Count (past year) 0              Current as of: 2/27/2019  9:05 AM              Clinical Concerns:  Current Medical Concerns:  Patient spent extensive time reflecting on his recent hospitalization, feeling it was a spiritual awakening and he is grateful to be alive. RN CC provide active listening and emotional support, but also tried to redirect conversation to review his health and exploring any questions or concerns after hospital discharge. Patient denies chest pain or SOB. He states he feels great and wishes he could snow blow his driveway, but Cardiac provider had advised not to for 2 weeks and patient wants to comply with all recommendations.      Current Behavioral Concerns: None reported      Education Provided to patient: introduced care coordination; reviewed when to contact healthcare team      Health Maintenance Reviewed: Due/Overdue  Health Maintenance Due   Topic Date Due     PREVENTIVE CARE VISIT  1965     COLON CANCER SCREEN (SYSTEM ASSIGNED)  11/30/2017     ZOSTER IMMUNIZATION (1 of 2) 11/30/2017     INFLUENZA VACCINE (1) 09/01/2018     Clinical Pathway: Clinic Care Coordination Acute MI Assessment    Discharge:  Hospital summary: Non-ST elevation myocardial infarction  Status post RCA stents  Day of hospital discharge: 2/23/19  What  recommendations were made for follow up after your recent hospitalization?   No snow blowing for at least 2 weeks    Have the follow up appointments been scheduled? Yes  If not, can I help you set up these appointments? N/A  Transportation concerns (GOAL):: No    Interventions during hospitalization:  STEMI  Is there a referral for Cardiac Rehab?  Yes    Symptom Review:  How have you been feeling now that you are home?    Are you having Angina symptoms (ie. Chest pain or pressure not relieved by medication, tingling, numbness, dizziness, fainting, heartburn, cold sweats, fatigue, anxiety, feeling of doom, irregular heartbeat, shortness of breath, nausea/vomiting and back or jaw pain.)?  No  Have you had to use SL NTG? No     Medications:  Were you started on any new medications?  Yes,   Do you have Nitroglycerin and understand how to use it?  Yes    Activity:  What activity guidelines did the physician give you at time of discharge from hospital? Please do not attempt snow removal for 2 weeks at least as recommended by cardiology    Functional Status:  Dependent ADLs:: Independent  Dependent IADLs:: Independent  Bed or wheelchair confined:: No  Mobility Status: Independent  Fallen 2 or more times in the past year?: No  Any fall with injury in the past year?: No    Living Situation:  Current living arrangement:: I live in a private home  Type of residence:: Private home - no stairs    Diet/Exercise/Sleep:  Diet:: Low saturated fat  Inadequate nutrition (GOAL):: No  Tube Feeding: No  Inadequate activity/exercise (GOAL):: No  Significant changes in sleep pattern (GOAL): No    Transportation:  Transportation concerns (GOAL):: No  Transportation means:: Regular car, Family     Psychosocial:  Holiness or spiritual beliefs that impact treatment:: No  Mental health DX:: No  Mental health management concern (GOAL):: No  Informal Support system:: Family     Financial/Insurance:   Financial/Insurance concerns (GOAL):: No    "  Resources and Interventions:  Current Resources:   Community Resources: Cardiac Rehab  Supplies used at home:: None  Equipment Currently Used at Home: none    Advance Care Plan/Directive  Advanced Care Plans/Directives on file:: No  Advanced Care Plan/Directive Status: Declined Further Information     Patient/Caregiver understanding: Patient demonstrated fair understanding of current plan of care. During beginning of conversation he was upset that he had to attend OP Cardiac Rehab because he believes \"it's just a protocol and I know what I need to do to recover.\" But the proceeded to say he plans to attend because he wants to follow all the instructions after discharge. Encouraged him to go into the sessions open minded and ask questions/provide feedback as appropriate to the clinicians. Patient also encouraged to schedule PCP follow up; he declined scheduling during this call and plans to call back at different time.       Future Appointments              In 5 days [place] CARDIAC REHAB EVALUATIONS [facility] Cardiac [facility], [place] [facility]    In 6 days [place] LAB [facility]    In 6 days Cyndy Hooker PA-C [facility]          Plan: 1) Patient will follow post-hospital instructions and precautions as detailed in AVS. Patient will also continue to follow plan of care.  2) Patient agreeable to follow up call from RN CC in 3-4 weeks; he is not interested in setting goals today.      ALYSSIA Cuevas, RN   RN Clinic Care Coordinator  [facility]  Phone: [phone]     "

## 2019-02-27 NOTE — PROGRESS NOTES
Clinic Care Coordination Contact  [facility]/Voicemail    Referral Source: IP Report  Patient presented to ED on 2/22/19 with chest pain and found to have elevated troponin. He discharged home AMA.  Patient presented back to ED on 2/23/19 and was agreeable to further workup and hospitalization. Patient was admitted at [facility] from 2/23/19 to 2/26/19 for diagnosis of NSTEMI.    Clinical Data: Care Coordinator Outreach    Outreach attempted x 1.  Left message on voicemail with call back information and requested return call.    Plan: Care Coordinator will try to reach patient again in 1-2 business days.    ALYSSIA Cuevas, RN   RN Clinic Care Coordinator  [facility]  [place]  Phone: [phone]

## 2019-02-27 NOTE — LETTER
[facility]  [address] [place] [address]      February 27, 2019    Andrei BOYER Jonah  [address] [address]      Dear Andrei,    I am a clinic care coordinator who works with Joel Santacruz MD at [facility]. I wanted to thank you for spending the time to talk with me.      Please feel free to contact me at [phone], with any questions or concerns.     Sincerely,     Lupe Mcgraw, MADIEN, RN   RN Clinic Care Coordinator  [facility]  [place]    Enclosed: I have enclosed a copy of a 24 Hour Access Plan. This has helpful phone numbers for you to call when needed. Please keep this in an easy to access place to use as needed.

## 2019-02-27 NOTE — TELEPHONE ENCOUNTER
Patient was evaluated by cardiology while inpatient for severe chest pain that radiated to brooke arms. 2/25/19: PCI with JAE x 2 to mRCA. Called patient to discuss any post hospital d/c questions, review medication changes, and confirm f/u appts. RN confirmed patient was d/c with antiplatelet Brilinta. Patient denied any questions regarding new medications or changes to PTA medications. Patient denied any SOB, chest pain, or light headedness. RRA cardiac cath site is without bleeding, swelling, redness or tenderness. Denies fever. RN confirmed with patient that he has an apt scheduled on 3/5/19 with ESTUARDO Samra Hooker with lab prior at our [facility]. Cardiac rehab scheduled on 3/4/19. Patient advised to call clinic with any cardiac related questions or concerns prior to this estuardo't. Patient verbalized understanding and agreed with plan. Instructed no snow blowing or snow removal for minimum 2 weeks. SUDARSHAN Dillard RN.

## 2019-02-28 LAB — INTERPRETATION ECG - MUSE: NORMAL

## 2019-03-04 ENCOUNTER — HOSPITAL ENCOUNTER (OUTPATIENT)
Dept: CARDIAC REHAB | Facility: CLINIC | Age: 54
End: 2019-03-04
Attending: INTERNAL MEDICINE
Payer: COMMERCIAL

## 2019-03-04 ENCOUNTER — HOSPITAL ENCOUNTER (EMERGENCY)
Facility: CLINIC | Age: 54
End: 2019-03-04
Payer: COMMERCIAL

## 2019-03-04 ENCOUNTER — NURSE TRIAGE (OUTPATIENT)
Dept: NURSING | Facility: CLINIC | Age: 54
End: 2019-03-04

## 2019-03-04 DIAGNOSIS — I24.9 ACS (ACUTE CORONARY SYNDROME) (H): ICD-10-CM

## 2019-03-04 PROCEDURE — 93798 PHYS/QHP OP CAR RHAB W/ECG: CPT

## 2019-03-04 PROCEDURE — 40000116 ZZH STATISTIC OP CR VISIT

## 2019-03-04 PROCEDURE — 93797 PHYS/QHP OP CAR RHAB WO ECG: CPT

## 2019-03-04 PROCEDURE — 40000575 ZZH STATISTIC OP CARDIAC VISIT #2

## 2019-03-04 NOTE — TELEPHONE ENCOUNTER
"Andrei with recent M.I., had stents placed.  Currently at cardiac rehab appt, calling for triage.  Reports since last week has been having right sided pain in the region \"where the rib cage meets my hip\" to right side.  Pain constant at least at a minimal level, worse with some movements.  No urinary / bowel / nausea complaints.  No visual difference noted such as redness / swelling, etc.  Triaged to be seen within 4 hours, no OV appts available so does plan to walk to UC/ED once therapy complete.    Reason for Disposition    [1] MILD-MODERATE pain AND [2] constant AND [3] present > 2 hours    Additional Information    Negative: [1] SEVERE pain (e.g., excruciating) AND [2] present > 1 hour    Negative: [1] SEVERE pain AND [2] age > 60    Negative: [1] Vomiting AND [2] contains red blood or black (\"coffee ground\") material  (Exception: few red streaks in vomit that only happened once)    Negative: Blood in bowel movements  (Exception: Blood on surface of BM with constipation)    Negative: Black or tarry bowel movements  (Exception: chronic-unchanged  black-grey bowel movements AND is taking iron pills or Pepto-bismol)    Negative: [1] Unable to urinate (or only a few drops) > 4 hours AND     [2] bladder feels very full (e.g., palpable bladder or strong urge to urinate)    Negative: [1] Pain in the scrotum or testicle AND [2] present > 1 hour    Negative: Patient sounds very sick or weak to the triager    Protocols used: ABDOMINAL PAIN - MALE-ADULT-      "

## 2019-03-05 ENCOUNTER — OFFICE VISIT (OUTPATIENT)
Dept: CARDIOLOGY | Facility: CLINIC | Age: 54
End: 2019-03-05
Attending: NURSE PRACTITIONER
Payer: COMMERCIAL

## 2019-03-05 ENCOUNTER — DOCUMENTATION ONLY (OUTPATIENT)
Dept: CARDIOLOGY | Facility: CLINIC | Age: 54
End: 2019-03-05

## 2019-03-05 VITALS
DIASTOLIC BLOOD PRESSURE: 64 MMHG | WEIGHT: 190 LBS | HEART RATE: 56 BPM | SYSTOLIC BLOOD PRESSURE: 97 MMHG | BODY MASS INDEX: 26.5 KG/M2

## 2019-03-05 DIAGNOSIS — I21.4 NSTEMI (NON-ST ELEVATED MYOCARDIAL INFARCTION) (H): ICD-10-CM

## 2019-03-05 DIAGNOSIS — E78.5 DYSLIPIDEMIA: ICD-10-CM

## 2019-03-05 DIAGNOSIS — R74.01 ELEVATED AST (SGOT): ICD-10-CM

## 2019-03-05 DIAGNOSIS — I21.4 NSTEMI (NON-ST ELEVATED MYOCARDIAL INFARCTION) (H): Primary | ICD-10-CM

## 2019-03-05 LAB
ANION GAP SERPL CALCULATED.3IONS-SCNC: 6 MMOL/L (ref 3–14)
BUN SERPL-MCNC: 15 MG/DL (ref 7–30)
CALCIUM SERPL-MCNC: 9.1 MG/DL (ref 8.5–10.1)
CHLORIDE SERPL-SCNC: 101 MMOL/L (ref 94–109)
CO2 SERPL-SCNC: 29 MMOL/L (ref 20–32)
CREAT SERPL-MCNC: 0.88 MG/DL (ref 0.66–1.25)
GFR SERPL CREATININE-BSD FRML MDRD: >90 ML/MIN/{1.73_M2}
GLUCOSE SERPL-MCNC: 87 MG/DL (ref 70–99)
POTASSIUM SERPL-SCNC: 4.3 MMOL/L (ref 3.4–5.3)
SODIUM SERPL-SCNC: 136 MMOL/L (ref 133–144)

## 2019-03-05 PROCEDURE — 36415 COLL VENOUS BLD VENIPUNCTURE: CPT | Performed by: NURSE PRACTITIONER

## 2019-03-05 PROCEDURE — 80048 BASIC METABOLIC PNL TOTAL CA: CPT | Performed by: NURSE PRACTITIONER

## 2019-03-05 PROCEDURE — 80076 HEPATIC FUNCTION PANEL: CPT | Performed by: PHYSICIAN ASSISTANT

## 2019-03-05 PROCEDURE — 99215 OFFICE O/P EST HI 40 MIN: CPT | Performed by: PHYSICIAN ASSISTANT
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
## 2019-03-05 NOTE — PROGRESS NOTES
Elisabeth/Karla -     Ricardo parts: Trying to save you a phone call but not forget anything!    1) I have added on a hepatic panel to the BMP that was drawn yesterday. Could you pls check to make sure this gets done (always afraid I've done it wrong)    2) Once that's back, I can address and when you call him re: results, you can also let pt know that Dr. Wren is fine with him seeing a chiropractor (no waiting period)    ADDENDUM: Call to OP lab to ask them to run the hepatic panel. Order released. Karla Rick RN Cardiology March 6, 2019, 8:47 AM

## 2019-03-06 DIAGNOSIS — R74.01 ELEVATED AST (SGOT): ICD-10-CM

## 2019-03-06 LAB
ALBUMIN SERPL-MCNC: 4.1 G/DL (ref 3.4–5)
ALP SERPL-CCNC: 90 U/L (ref 40–150)
ALT SERPL W P-5'-P-CCNC: 57 U/L (ref 0–70)
AST SERPL W P-5'-P-CCNC: 26 U/L (ref 0–45)
BILIRUB DIRECT SERPL-MCNC: <0.1 MG/DL (ref 0–0.2)
BILIRUB SERPL-MCNC: 0.3 MG/DL (ref 0.2–1.3)
PROT SERPL-MCNC: 8.1 G/DL (ref 6.8–8.8)

## 2019-03-06 NOTE — PROGRESS NOTES
Pls let pt know that labs looked good (BMP and hepatic).  All normal after hospitalization.    - As below, OK to go to chiropractor per Dr. Wren  - I have spoken with med recs and they will request a copy of his angiogram (images) be sent to him.  If he hasn't received disk within 2 weeks, call [phone] as it's all done through centralized imaging at the  now.    Thx    Component      Latest Ref Rng & Units 2/22/2019 2/26/2019 3/5/2019   Bilirubin Total      0.2 - 1.3 mg/dL 0.7  0.3   Albumin      3.4 - 5.0 g/dL 3.7  4.1   Protein Total      6.8 - 8.8 g/dL 7.6  8.1   Alkaline Phosphatase      40 - 150 U/L 68  90   ALT      0 - 70 U/L 51  57   AST      0 - 45 U/L 111 (H)  26   Bilirubin Direct      0.0 - 0.2 mg/dL   <0.1     Component      Latest Ref Rng & Units 2/22/2019 2/26/2019 3/5/2019   Sodium      133 - 144 mmol/L 138 139 136   Potassium      3.4 - 5.3 mmol/L 3.6 3.9 4.3   Chloride      94 - 109 mmol/L 104 106 101   Carbon Dioxide      20 - 32 mmol/L 28 24 29   Anion Gap      3 - 14 mmol/L 6 9 6   Glucose      70 - 99 mg/dL 100 (H) 97 87   Urea Nitrogen      7 - 30 mg/dL 17 11 15   Creatinine      0.66 - 1.25 mg/dL 0.91 0.73 0.88   GFR Estimate      >60 mL/min/1.73:m2 >90 >90 >90   GFR Estimate If Black      >60 mL/min/1.73:m2 >90 >90 >90   Calcium      8.5 - 10.1 mg/dL 8.7 8.9 9.1     ADDENDUM: Pt called back to discuss results and recommendations. Phone number given for [facility] Medical Records in case he doesn't receive angiogram films--sent in [employer] message as pt driving. Karla Rick RN Cardiology March 6, 2019, 2:57 PM

## 2019-03-13 NOTE — PROGRESS NOTES
Cardiac Rehab Discharge Summary    Reason for discharge:    Patient/family request discontinuation of services.    Progress towards goals:  Goals not met.  Barriers to achieving goals:   discharge on same date as initial evaluation.    Recommendation(s):    Pt would benefit from a home exercise program and further rehabilitation if chooses to attend,

## 2019-03-18 ENCOUNTER — PATIENT OUTREACH (OUTPATIENT)
Dept: CARE COORDINATION | Facility: CLINIC | Age: 54
End: 2019-03-18

## 2019-03-18 ASSESSMENT — ACTIVITIES OF DAILY LIVING (ADL): DEPENDENT_IADLS:: INDEPENDENT

## 2019-03-18 NOTE — PROGRESS NOTES
Clinic Care Coordination Contact    Clinic Care Coordination Contact  OUTREACH    Referral Information:  Referral Source: IP Report    Primary Diagnosis: Acute MI (heart attack)    Chief Complaint   Patient presents with     Clinic Care Coordination - Follow-up     RN        Universal Utilization: Patient had Cardiology follow up on 3/5/19.  Clinic Utilization  Difficulty keeping appointments:: No  Compliance Concerns: Yes(left ED AMA on 2/22/19)  No-Show Concerns: No  No PCP office visit in Past Year: No  Utilization    Last refreshed: 3/16/2019  6:55 AM:  Hospital Admissions 1           Last refreshed: 3/16/2019  6:55 AM:  ED Visits 1           Last refreshed: 3/16/2019  6:55 AM:  No Show Count (past year) 0              Current as of: 3/16/2019  6:55 AM            Clinical Concerns:  Current Medical Concerns: Patient reports he can't speak on phone for long but that he is doing well. He doesn't feel he needs clinic care coordination support at this time.    Current Behavioral Concerns: None reported      Education Provided to patient: phone call was very brief however RN CC encouraged patient to contact Cardiology clinic with questions or concerns related to his heart care and primary care with any general questions/concerns    Pain  Pain (GOAL):: No  Health Maintenance Reviewed: Due/Overdue  Health Maintenance Due   Topic Date Due     PREVENTIVE CARE VISIT  1965     COLON CANCER SCREEN (SYSTEM ASSIGNED)  11/30/2017     ZOSTER IMMUNIZATION (1 of 2) 11/30/2017     INFLUENZA VACCINE (1) 09/01/2018       Clinical Pathway: None    Medication Management:  Not reviewed as patient kept phone call very brief, however, per recent cardiology office visit, patient is compliant with medications and stated no concerns.    Functional Status:  Dependent ADLs:: Independent  Dependent IADLs:: Independent  Bed or wheelchair confined:: No  Mobility Status: Independent  Fallen 2 or more times in the past year?: No  Any fall  "with injury in the past year?: No    Living Situation:  Current living arrangement:: I live in a private home  Type of residence:: Private home - no stairs    Diet/Exercise/Sleep:  Diet:: Low saturated fat  Inadequate nutrition (GOAL):: No  Tube Feeding: No  Exercise:: Yes(patient didn't elaborate on frequency or intensity but stated \"I stay active\")  Inadequate activity/exercise (GOAL):: No  Significant changes in sleep pattern (GOAL): No    Transportation:  Transportation concerns (GOAL):: No  Transportation means:: Regular car, Family     Psychosocial:  Uatsdin or spiritual beliefs that impact treatment:: No  Mental health DX:: No  Mental health management concern (GOAL):: No  Informal Support system:: Family     Financial/Insurance:   Financial/Insurance concerns (GOAL):: No     Resources and Interventions:  Current Resources:   Community Resources: None  Supplies used at home:: None  Equipment Currently Used at Home: none    Advance Care Plan/Directive  Advanced Care Plans/Directives on file:: No  Advanced Care Plan/Directive Status: Declined Further Information    Patient/Caregiver understanding: Patient demonstrated fair understanding of plan of care. He declined ongoing care coordination as he feels he can call clinic when he needs to for questions or concerns.        Future Appointments              [facility]    In 2 months [address] [facility]    In 2 months aMngo Rodriguez MD [facility]          Plan: 1) Patient encouraged to maintain clinic follow up as advised by his care team.  2) No further care coordination outreaches planned. Encouraged patient to call RN CC if he has future needs or concerns. In meantime, patient prefers to make calls to clinic as needed.    ALYSSIA Cuevas, RN   RN Clinic Care Coordinator  [facility]:  [place]  Phone: [phone]       "

## 2019-03-24 ENCOUNTER — TELEPHONE (OUTPATIENT)
Dept: FAMILY MEDICINE | Facility: CLINIC | Age: 54
End: 2019-03-24

## 2019-03-24 DIAGNOSIS — I24.9 ACS (ACUTE CORONARY SYNDROME) (H): Primary | ICD-10-CM

## 2019-03-24 NOTE — TELEPHONE ENCOUNTER
Patient called pharmacy and he is not able to cut the lopressor tablets in half so he says he either doesn't take it or just takes a full tablet. The last prescriber was a hospitalist at [facility] so I can't reach out to them for refills. Would it be possible to change him to toprol xl 25 mg 1 tablet daily? I asked him if he made a follow up appointment and he said he couldn't get in until April so he hung up. I told him that he would probably have to make an appointment before the doctor would address this issue but he is almost out of medication. Thanks!    Linette Cannon, [facility] [place]  [phone]

## 2019-03-25 RX ORDER — METOPROLOL SUCCINATE 25 MG/1
25 TABLET, EXTENDED RELEASE ORAL DAILY
Qty: 90 TABLET | Refills: 3 | Status: SHIPPED | OUTPATIENT
Start: 2019-03-25 | End: 2019-09-30

## 2019-03-25 NOTE — TELEPHONE ENCOUNTER
Dr. Santacruz,    This patient was seen in [facility] for a NSTEMI on 2/23/19.  He followed up with cardiology on 3/5/19.  He was prescribed metoprolol 25 mg tablets and asked to cut them in half.  Per patient he is not able to do that.  Told the pharmacist its all or nothing.  The pharmacist is wondering if he can get a new prescription for toprol xl 25 mg 1 tablet daily.  Or should this be something we should be sending to cardiology?  Please advise. Jyotsna KLEIN RN

## 2019-04-29 DIAGNOSIS — I24.9 ACS (ACUTE CORONARY SYNDROME) (H): ICD-10-CM

## 2019-04-29 DIAGNOSIS — I21.4 NSTEMI (NON-ST ELEVATED MYOCARDIAL INFARCTION) (H): ICD-10-CM

## 2019-04-29 NOTE — TELEPHONE ENCOUNTER
"Requested Prescriptions   Pending Prescriptions Disp Refills     nitroGLYcerin (NITROSTAT) 0.4 MG sublingual tablet 30 tablet 0     Sig: For chest pain place 1 tablet under the tongue every 5 minutes for 3 doses. If symptoms persist 5 minutes after 1st dose call 911.   Last Written Prescription Date:  2/26/19  Last Fill Quantity: 30 tab,  # refills: 0   Last office visit: 8/20/2018 with prescribing provider:  Joel Santacruz     Future Office Visit:   Next 5 appointments (look out 90 days)    May 30, 2019  8:30 AM CDT  Return Visit with Mango Rodriguez MD  [facility] ([facility]) [address] [address] [phone]             Nitrates Failed - 4/29/2019 12:19 PM        Failed - Sublingual nitro order needs review     If refill exceeds 1 bottle per month, please forward request to provider.           Passed - Blood pressure under 140/90 in past 12 months     BP Readings from Last 3 Encounters:   03/05/19 97/64   02/26/19 115/79   02/22/19 124/80                 Passed - Pt is not on erectile dysfunction medications        Passed - Recent (12 mo) or future (30 days) visit within the authorizing provider's specialty     Patient had office visit in the last 12 months or has a visit in the next 30 days with authorizing provider or within the authorizing provider's specialty.  See \"Patient Info\" tab in inbasket, or \"Choose Columns\" in Meds & Orders section of the refill encounter.              Passed - Medication is active on med list        Passed - Patient is age 18 or older          "

## 2019-05-01 RX ORDER — NITROGLYCERIN 0.4 MG/1
TABLET SUBLINGUAL
Qty: 30 TABLET | Refills: 1 | Status: SHIPPED | OUTPATIENT
Start: 2019-05-01 | End: 2019-09-30

## 2019-05-01 NOTE — TELEPHONE ENCOUNTER
Routing refill request to provider for review/approval because:  PCP is not original prescriber of this medication.  Patient had NSTEMI 2-23-19, stent placed.  Was prescribed during hospitalization.  Has not f/u with PCP since dishcarge.  Cardiology OV 3-5-19, has f/u scheduled 5-30-19.  Do you prefer this refill be routed to cardiology for refill consideration?    Routing to provider.  Apple BENDER RN

## 2019-05-23 ENCOUNTER — HOSPITAL ENCOUNTER (OUTPATIENT)
Dept: CARDIOLOGY | Facility: CLINIC | Age: 54
Discharge: HOME | End: 2019-05-23
Attending: PHYSICIAN ASSISTANT | Admitting: PHYSICIAN ASSISTANT
Payer: COMMERCIAL

## 2019-05-23 DIAGNOSIS — E78.5 DYSLIPIDEMIA: ICD-10-CM

## 2019-05-23 DIAGNOSIS — I21.4 NSTEMI (NON-ST ELEVATED MYOCARDIAL INFARCTION) (H): ICD-10-CM

## 2019-05-23 LAB
ALT SERPL W P-5'-P-CCNC: 65 U/L (ref 0–70)
ANION GAP SERPL CALCULATED.3IONS-SCNC: 8 MMOL/L (ref 3–14)
BUN SERPL-MCNC: 14 MG/DL (ref 7–30)
CALCIUM SERPL-MCNC: 9 MG/DL (ref 8.5–10.1)
CHLORIDE SERPL-SCNC: 108 MMOL/L (ref 94–109)
CHOLEST SERPL-MCNC: 152 MG/DL
CO2 SERPL-SCNC: 26 MMOL/L (ref 20–32)
CREAT SERPL-MCNC: 0.79 MG/DL (ref 0.66–1.25)
GFR SERPL CREATININE-BSD FRML MDRD: >90 ML/MIN/{1.73_M2}
GLUCOSE SERPL-MCNC: 101 MG/DL (ref 70–99)
HDLC SERPL-MCNC: 39 MG/DL
LDLC SERPL CALC-MCNC: 78 MG/DL
NONHDLC SERPL-MCNC: 113 MG/DL
POTASSIUM SERPL-SCNC: 4.1 MMOL/L (ref 3.4–5.3)
SODIUM SERPL-SCNC: 142 MMOL/L (ref 133–144)
TRIGL SERPL-MCNC: 176 MG/DL

## 2019-05-23 PROCEDURE — 36415 COLL VENOUS BLD VENIPUNCTURE: CPT | Performed by: PHYSICIAN ASSISTANT

## 2019-05-23 PROCEDURE — 80048 BASIC METABOLIC PNL TOTAL CA: CPT | Performed by: PHYSICIAN ASSISTANT

## 2019-05-23 PROCEDURE — 93306 TTE W/DOPPLER COMPLETE: CPT | Mod: 26 | Performed by: INTERNAL MEDICINE

## 2019-05-23 PROCEDURE — 84460 ALANINE AMINO (ALT) (SGPT): CPT | Performed by: PHYSICIAN ASSISTANT

## 2019-05-23 PROCEDURE — 80061 LIPID PANEL: CPT | Performed by: PHYSICIAN ASSISTANT

## 2019-05-23 PROCEDURE — 93306 TTE W/DOPPLER COMPLETE: CPT

## 2019-05-26 NOTE — PROGRESS NOTES
CARDIOLOGY VISIT    REASON FOR VISIT: Coronary artery disease    SUBJECTIVE:  53-year-old male seen for follow-up of coronary artery disease.  He also has dyslipidemia, tobacco abuse, and mild ascending aortic enlargement.     In 2015 he had a calcium score of 721.  He had normal stress echo was in 2015 and 2017.     February 2019 he had inferior myocardial infarction with 100% RCA occlusion and 90% distal RCA lesion treated with 2 drug-eluting stents.  Left main was normal, LAD had up to 40% mid lesion, circumflex had minimal disease.  It was felt this was a delayed presentation of an MI, troponin was 11, then trended down.  Echo showed EF 50 to 55% with basal inferior hypokinesis, normal RV, no valve disease, aorta 3.9 cm.     Echo May 2019 showed EF 50 to 55%, basal inferior hypokinesis, normal RV, no valve disease, aorta 3.9 cm.     Overall he has been doing well.  He has been doing some moderate activity with no chest pain or shortness of breath.  He has done a tremendous amount of research on heart disease and has many, many questions today.  He has altered his diet and is very into the whole food plant-based diet.    MEDICATIONS:  Current Outpatient Medications   Medication     arginine 500 MG capsule     aspirin 81 MG tablet     cholecalciferol (VITAMIN D3) 5000 units (125 mcg) capsule     Cholecalciferol (VITAMIN D3) 5000 UNITS TBDP     citrulline (CITRULLINE) 600 MG CAPS     coenzyme Q-10 100 MG CAPS     ketotifen (ZADITOR/REFRESH ANTI-ITCH) 0.025 % ophthalmic solution     Magnesium Citrate 100 MG TABS     Menaquinone-7 100 MCG CAPS     metoprolol succinate ER (TOPROL-XL) 25 MG 24 hr tablet     Multiple Vitamins-Minerals (MULTIVITAL PO)     nitroGLYcerin (NITROSTAT) 0.4 MG sublingual tablet     rosuvastatin (CRESTOR) 20 MG tablet     Saw Palmetto, Serenoa repens, (SAW PALMETTO PO)     ticagrelor (BRILINTA) 90 MG tablet     No current facility-administered medications for this visit.         ALLERGIES:  Allergies   Allergen Reactions     Fenofibrate      He has muscle aches.     Hmg-Coa-R Inhibitors      He did not tolerate.     Mildew      Mold      Penicillins Nausea and Vomiting       REVIEW OF SYSTEMS:  Constitutional:  No weight loss, fever, chills, weakness or fatigue.  HEENT:  Eyes:  No visual loss, blurred vision, double vision or yellow sclerae. No hearing loss, sneezing, congestion, runny nose or sore throat.  Skin:  No rash or itching.  Cardiovascular: per HPI  Respiratory: per HPI  GI:  No anorexia, nausea, vomiting or diarrhea. No abdominal pain or blood.  :  No dysurea, hematuria  Neurologic:  No headache, dizziness, syncope, paralysis, ataxia, numbness or tingling in the extremities. No change in bowel or bladder control.  Musculoskeletal:  No muscle, back pain, joint pain or stiffness.  Hematologic:  No anemia, bleeding or bruising.  Lymphatics:  No enlarged nodes. No history of splenectomy.  Psychiatric:  No history of depression or anxiety.  Endocrine:  No reports of sweating, cold or heat intolerance. No polyuria or polydipsia.  Allergies:  No history of asthma, hives, eczema or rhinitis.    PHYSICAL EXAM:  /78   Pulse 62   Wt 85.7 kg (189 lb)   SpO2 97%   BMI 26.36 kg/m    Constitutional: awake, alert, no distress  Eyes: PERRL, sclera nonicteric  ENT: trachea midline  Respiratory: Lungs clear  Cardiovascular: Regular rate and rhythm, no murmurs  GI: nondistended, nontender, bowel sounds present  Lymph/Hematologic: no lymphadenopathy  Skin: dry, no rash  Musculoskeletal: good muscle tone, strength 5/5 in upper and lower extremities  Neurologic: no focal deficits  Neuropsychiatric: appropriate affact    DATA:  Lab: May 2019: Cholesterol 152, HDL 39, LDL 78, triglycerides 176, potassium 4.1, creatinine 0.7     ASSESSMENT:  53-year-old male seen for follow-up of coronary artery disease.  He is doing very well 3 months after his stent.  Ejection fraction is low normal,  he has scar in the inferior territory.  His blood pressure and lipids are under good control.  We talked about the importance of nutrition, thankfully he has discovered the whole food plant-based diet, which has been shown to be the most heart healthy diet.    RECOMMENDATIONS:  1.  Coronary artery disease  -Continue dual antiplatelet therapy through February 2020  -Continue other medications  -Encouraged 150 minutes of exercise per week  -Encouraged adherence to the whole food plant-based diet    Follow-up in 9 months.    Mango Rodriguez MD  Cardiology - [facility]  Pager:  [phone]  Text Page  May 26, 2019

## 2019-05-30 ENCOUNTER — OFFICE VISIT (OUTPATIENT)
Dept: CARDIOLOGY | Facility: CLINIC | Age: 54
End: 2019-05-30
Attending: PHYSICIAN ASSISTANT
Payer: COMMERCIAL

## 2019-05-30 VITALS
OXYGEN SATURATION: 97 % | DIASTOLIC BLOOD PRESSURE: 78 MMHG | BODY MASS INDEX: 26.36 KG/M2 | SYSTOLIC BLOOD PRESSURE: 106 MMHG | HEART RATE: 62 BPM | WEIGHT: 189 LBS

## 2019-05-30 DIAGNOSIS — I25.10 CORONARY ARTERY DISEASE INVOLVING NATIVE CORONARY ARTERY OF NATIVE HEART WITHOUT ANGINA PECTORIS: Primary | ICD-10-CM

## 2019-05-30 DIAGNOSIS — E78.5 DYSLIPIDEMIA: ICD-10-CM

## 2019-05-30 DIAGNOSIS — I21.4 NSTEMI (NON-ST ELEVATED MYOCARDIAL INFARCTION) (H): ICD-10-CM

## 2019-05-30 PROCEDURE — 99214 OFFICE O/P EST MOD 30 MIN: CPT | Performed by: INTERNAL MEDICINE

## 2019-05-30 NOTE — LETTER
5/30/2019    Joel Santacruz MD  [address] [address]    RE: Andrei BOYER Jonah       Dear Colleague,    I had the pleasure of seeing Andrei Mckenzie in the [facility].    CARDIOLOGY VISIT    REASON FOR VISIT: Coronary artery disease    SUBJECTIVE:  53-year-old male seen for follow-up of coronary artery disease.  He also has dyslipidemia, tobacco abuse, and mild ascending aortic enlargement.     In 2015 he had a calcium score of 721.  He had normal stress echo was in 2015 and 2017.     February 2019 he had inferior myocardial infarction with 100% RCA occlusion and 90% distal RCA lesion treated with 2 drug-eluting stents.  Left main was normal, LAD had up to 40% mid lesion, circumflex had minimal disease.  It was felt this was a delayed presentation of an MI, troponin was 11, then trended down.  Echo showed EF 50 to 55% with basal inferior hypokinesis, normal RV, no valve disease, aorta 3.9 cm.     Echo May 2019 showed EF 50 to 55%, basal inferior hypokinesis, normal RV, no valve disease, aorta 3.9 cm.     Overall he has been doing well.  He has been doing some moderate activity with no chest pain or shortness of breath.  He has done a tremendous amount of research on heart disease and has many, many questions today.  He has altered his diet and is very into the whole food plant-based diet.    MEDICATIONS:  Current Outpatient Medications   Medication     arginine 500 MG capsule     aspirin 81 MG tablet     cholecalciferol (VITAMIN D3) 5000 units (125 mcg) capsule     Cholecalciferol (VITAMIN D3) 5000 UNITS TBDP     citrulline (CITRULLINE) 600 MG CAPS     coenzyme Q-10 100 MG CAPS     ketotifen (ZADITOR/REFRESH ANTI-ITCH) 0.025 % ophthalmic solution     Magnesium Citrate 100 MG TABS     Menaquinone-7 100 MCG CAPS     metoprolol succinate ER (TOPROL-XL) 25 MG 24 hr tablet     Multiple Vitamins-Minerals (MULTIVITAL PO)     nitroGLYcerin (NITROSTAT) 0.4 MG sublingual tablet      rosuvastatin (CRESTOR) 20 MG tablet     Saw Palmetto, Serenoa repens, (SAW PALMETTO PO)     ticagrelor (BRILINTA) 90 MG tablet     No current facility-administered medications for this visit.        ALLERGIES:  Allergies   Allergen Reactions     Fenofibrate      He has muscle aches.     Hmg-Coa-R Inhibitors      He did not tolerate.     Mildew      Mold      Penicillins Nausea and Vomiting       REVIEW OF SYSTEMS:  Constitutional:  No weight loss, fever, chills, weakness or fatigue.  HEENT:  Eyes:  No visual loss, blurred vision, double vision or yellow sclerae. No hearing loss, sneezing, congestion, runny nose or sore throat.  Skin:  No rash or itching.  Cardiovascular: per HPI  Respiratory: per HPI  GI:  No anorexia, nausea, vomiting or diarrhea. No abdominal pain or blood.  :  No dysurea, hematuria  Neurologic:  No headache, dizziness, syncope, paralysis, ataxia, numbness or tingling in the extremities. No change in bowel or bladder control.  Musculoskeletal:  No muscle, back pain, joint pain or stiffness.  Hematologic:  No anemia, bleeding or bruising.  Lymphatics:  No enlarged nodes. No history of splenectomy.  Psychiatric:  No history of depression or anxiety.  Endocrine:  No reports of sweating, cold or heat intolerance. No polyuria or polydipsia.  Allergies:  No history of asthma, hives, eczema or rhinitis.    PHYSICAL EXAM:  /78   Pulse 62   Wt 85.7 kg (189 lb)   SpO2 97%   BMI 26.36 kg/m     Constitutional: awake, alert, no distress  Eyes: PERRL, sclera nonicteric  ENT: trachea midline  Respiratory: Lungs clear  Cardiovascular: Regular rate and rhythm, no murmurs  GI: nondistended, nontender, bowel sounds present  Lymph/Hematologic: no lymphadenopathy  Skin: dry, no rash  Musculoskeletal: good muscle tone, strength 5/5 in upper and lower extremities  Neurologic: no focal deficits  Neuropsychiatric: appropriate affact    DATA:  Lab: May 2019: Cholesterol 152, HDL 39, LDL 78, triglycerides 176,  potassium 4.1, creatinine 0.7     ASSESSMENT:  53-year-old male seen for follow-up of coronary artery disease.  He is doing very well 3 months after his stent.  Ejection fraction is low normal, he has scar in the inferior territory.  His blood pressure and lipids are under good control.  We talked about the importance of nutrition, thankfully he has discovered the whole food plant-based diet, which has been shown to be the most heart healthy diet.    RECOMMENDATIONS:  1.  Coronary artery disease  -Continue dual antiplatelet therapy through February 2020  -Continue other medications  -Encouraged 150 minutes of exercise per week  -Encouraged adherence to the whole food plant-based diet    Follow-up in 9 months.    Mango Rodriguez MD  Cardiology - [facility]  Pager:  [phone]  Text Page  May 26, 2019    Thank you for allowing me to participate in the care of your patient.      Sincerely,     Mango Rodriguez MD     [facility]

## 2019-05-30 NOTE — PATIENT INSTRUCTIONS
"[facility]  [facility]~[address]~[place], [place]~[address]  Thank you for your [facility] visit today. If you have questions regarding your visit, please contact your cardiology RN's, Elisabeth Neri or Karla Rick, at [phone].      To schedule a future appointment, we kindly ask that you call cardiology scheduling at [phone] three months prior to requested revisit date.  [facility] cardiology clinic is staffed with \"Advance Practice Providers\". These are our cardiology Physician Assistants and Nurse Practitioners.   Please call cardiology scheduling if you feel you need clinical evaluation with them at any time for any cardiac reason.   Reminder:  For your safety, we ask that you bring in your current medication(s) or an updated list of your medications with you to EACH office visit. Include the medication name, dose of pill on bottle and how you are taking it. Include over-the-counter medications or supplements. Your provider will review this at each visit and plan your care based on your current information.   ~~~~~~~~~~~~~~~~~~~~~~~~~~~~~~~~~~~~~~~  \"[facility]\" [place] telephone numbers for reference:  Cardiology Scheduling~[phone]  Diagnostic Imaging Scheduling~[phone]  Lab Scheduling~[phone]  Anticoagulation Clinic~[phone]  Cardiac Rehabilitation~[phone]  CORE Clinic RN's~[phone] (at [facility])  [place] Team [phone]  Cardiology Clinic RN's~[phone] (Karla Rick, RN & Elisabeth Neri, RN)  ~~~~~~~~~~~~~~~~~~~~~~~~~~~~~~~~~~~~~~~~        "

## 2019-06-14 ENCOUNTER — TELEPHONE (OUTPATIENT)
Dept: CARDIOLOGY | Facility: CLINIC | Age: 54
End: 2019-06-14

## 2019-06-14 DIAGNOSIS — I24.9 ACS (ACUTE CORONARY SYNDROME) (H): Primary | ICD-10-CM

## 2019-06-14 RX ORDER — CLOPIDOGREL BISULFATE 75 MG/1
TABLET ORAL
Qty: 94 TABLET | Refills: 2 | Status: SHIPPED | OUTPATIENT
Start: 2019-06-14 | End: 2020-03-02

## 2019-06-14 NOTE — TELEPHONE ENCOUNTER
Pt calls in to state he got a letter from [facility] stating the Brilinta will no longer be on the preferred formulary as of July 1 2019 and he will need a PA if he is to stay on it. Pt had JAE x 2 to mRCA on 2/23/19. Will discuss with Dr Rodriguez if he wants pt to stay on Brilinta or if he can transition to Plavix. Karla Rick RN Cardiology June 14, 2019, 1:14 PM

## 2019-06-14 NOTE — TELEPHONE ENCOUNTER
He can change to  clopidogrel, 300 mg the first day, then 75 mg daily thereafter.    Mango Rodriguez MD  Cardiology - [facility] [[email or web address]] Heart  Pager: [phone]  Text Page  June 14, 2019    ADDENDUM: Disc change to clopidogrel. Pt a little leery of loading dose but is willing to switch to clopidogrel. Prescription sent to pharmacy. Karla Rick RN Cardiology June 14, 2019, 1:35 PM

## 2019-07-01 NOTE — PATIENT INSTRUCTIONS
1. Reviewed angiogram and echocardiogram     2. Keep an eye on the L flank/lower belly discomfort.  Let Dr. Santacruz know or call my office and we can get blood work/XRay    3. See us back in 3 months with cholesterol panel and echocardiogram     4. Hold off on dentist x 3 months unless emergent. DO NOT STOP BRILINTA or ASA for this!    5. My nurses are Elisabeth/Karla ([place]) - [phone]      
No

## 2019-08-26 DIAGNOSIS — Z82.49 FH: PREMATURE CORONARY HEART DISEASE: ICD-10-CM

## 2019-08-26 DIAGNOSIS — E78.1 HYPERTRIGLYCERIDEMIA: ICD-10-CM

## 2019-08-26 DIAGNOSIS — Z72.0 TOBACCO ABUSE: ICD-10-CM

## 2019-08-26 NOTE — LETTER
[facility]  [address] [address]  Phone: [phone]       August 27, 2019         Andrei Mckenzie  [address] [address]            Dear Andrei:    We are concerned about your health care.  We recently provided you with medication refills.  Many medications require routine follow-up with your doctor.    Your prescription(s) have been refilled for 30 days so you may have time for the above noted follow-up. Please call to schedule soon so we can assure you have an appointment before your next refills are needed.    Thank you,      Joel Santacruz MD / maxime

## 2019-08-26 NOTE — TELEPHONE ENCOUNTER
"Requested Prescriptions   Pending Prescriptions Disp Refills     rosuvastatin (CRESTOR) 20 MG tablet [Pharmacy Med Name: ROSUVASTATIN CALCIUM 20MG TABS] 90 tablet 1     Sig: TAKE ONE TABLET BY MOUTH ONCE DAILY  Last Written Prescription Date:  2/26/2019  Last Fill Quantity: 30,  # refills: 0   Last office visit: 8/20/2018 with prescribing provider:  Noam    Future Office Visit:           Statins Protocol Failed - 8/26/2019 11:51 AM        Failed - Recent (12 mo) or future (30 days) visit within the authorizing provider's specialty     Patient had office visit in the last 12 months or has a visit in the next 30 days with authorizing provider or within the authorizing provider's specialty.  See \"Patient Info\" tab in inbasket, or \"Choose Columns\" in Meds & Orders section of the refill encounter.              Passed - LDL on file in past 12 months     Recent Labs   Lab Test 05/23/19  0623   LDL 78             Passed - No abnormal creatine kinase in past 12 months     Recent Labs   Lab Test 07/13/15  1021                   Passed - Medication is active on med list        Passed - Patient is age 18 or older          "

## 2019-08-27 ENCOUNTER — MYC MEDICAL ADVICE (OUTPATIENT)
Dept: FAMILY MEDICINE | Facility: CLINIC | Age: 54
End: 2019-08-27

## 2019-08-27 RX ORDER — ROSUVASTATIN CALCIUM 20 MG/1
TABLET, COATED ORAL
Qty: 30 TABLET | Refills: 0 | Status: SHIPPED | OUTPATIENT
Start: 2019-08-27 | End: 2019-09-30

## 2019-09-24 ENCOUNTER — MYC MEDICAL ADVICE (OUTPATIENT)
Dept: FAMILY MEDICINE | Facility: CLINIC | Age: 54
End: 2019-09-24

## 2019-09-24 DIAGNOSIS — Z12.5 SCREENING FOR PROSTATE CANCER: Primary | ICD-10-CM

## 2019-09-24 NOTE — TELEPHONE ENCOUNTER
He does not need fasting since he is follow up with Cards in the future per their note.  I did order his psa test.  Joel Santacruz MD  Family Medicine

## 2019-09-24 NOTE — TELEPHONE ENCOUNTER
Dr Santacruz please see his mychart note,   Cardiology has a future lipid and ALT ordered, although he doesn't need to do them now/ follow up until February, per their last note.     He is asking about psa also, and labs prior to appt?   Or see him first?     Shanna Lundberg RNC

## 2019-09-30 ENCOUNTER — OFFICE VISIT (OUTPATIENT)
Dept: FAMILY MEDICINE | Facility: CLINIC | Age: 54
End: 2019-09-30
Payer: COMMERCIAL

## 2019-09-30 VITALS
HEIGHT: 71 IN | TEMPERATURE: 97.4 F | BODY MASS INDEX: 27.44 KG/M2 | WEIGHT: 196 LBS | OXYGEN SATURATION: 96 % | SYSTOLIC BLOOD PRESSURE: 108 MMHG | DIASTOLIC BLOOD PRESSURE: 70 MMHG | HEART RATE: 65 BPM

## 2019-09-30 DIAGNOSIS — E78.5 HYPERLIPIDEMIA LDL GOAL <70: ICD-10-CM

## 2019-09-30 DIAGNOSIS — Z82.49 FH: PREMATURE CORONARY HEART DISEASE: ICD-10-CM

## 2019-09-30 DIAGNOSIS — Z23 NEED FOR VACCINATION: ICD-10-CM

## 2019-09-30 DIAGNOSIS — I25.10 CORONARY ARTERY DISEASE DUE TO LIPID RICH PLAQUE: Primary | ICD-10-CM

## 2019-09-30 DIAGNOSIS — Z72.0 TOBACCO ABUSE: ICD-10-CM

## 2019-09-30 DIAGNOSIS — Z12.5 SCREENING FOR PROSTATE CANCER: ICD-10-CM

## 2019-09-30 DIAGNOSIS — I25.83 CORONARY ARTERY DISEASE DUE TO LIPID RICH PLAQUE: Primary | ICD-10-CM

## 2019-09-30 DIAGNOSIS — E78.1 HYPERTRIGLYCERIDEMIA: ICD-10-CM

## 2019-09-30 LAB — PSA SERPL-ACNC: 1.42 UG/L (ref 0–4)

## 2019-09-30 PROCEDURE — 90471 IMMUNIZATION ADMIN: CPT | Performed by: FAMILY MEDICINE

## 2019-09-30 PROCEDURE — 90732 PPSV23 VACC 2 YRS+ SUBQ/IM: CPT | Performed by: FAMILY MEDICINE

## 2019-09-30 PROCEDURE — 36415 COLL VENOUS BLD VENIPUNCTURE: CPT | Performed by: FAMILY MEDICINE

## 2019-09-30 PROCEDURE — 99214 OFFICE O/P EST MOD 30 MIN: CPT | Mod: 25 | Performed by: FAMILY MEDICINE

## 2019-09-30 PROCEDURE — G0103 PSA SCREENING: HCPCS | Performed by: FAMILY MEDICINE

## 2019-09-30 RX ORDER — NITROGLYCERIN 0.4 MG/1
TABLET SUBLINGUAL
Qty: 25 TABLET | Refills: 3 | Status: SHIPPED | OUTPATIENT
Start: 2019-09-30 | End: 2023-09-21

## 2019-09-30 RX ORDER — ROSUVASTATIN CALCIUM 20 MG/1
20 TABLET, COATED ORAL DAILY
Qty: 90 TABLET | Refills: 3 | Status: SHIPPED | OUTPATIENT
Start: 2019-09-30 | End: 2020-10-05

## 2019-09-30 RX ORDER — METOPROLOL SUCCINATE 25 MG/1
25 TABLET, EXTENDED RELEASE ORAL DAILY
Qty: 90 TABLET | Refills: 3 | Status: SHIPPED | OUTPATIENT
Start: 2019-09-30 | End: 2020-10-05

## 2019-09-30 ASSESSMENT — MIFFLIN-ST. JEOR: SCORE: 1766.18

## 2019-09-30 NOTE — PATIENT INSTRUCTIONS
Please go to lab.    I refilled your medications.    Follow up with cardiology in 2/2020 and get your fasting labs done prior to that visit.    I recommend to quit smoking since you have had a heart attack, you are at risk for a recurrence and smoking increases that risk.          Thank you for choosing [facility].  You may be receiving an email and/or telephone survey request from [facility] Customer Experience regarding your visit today.  Please take a few minutes to respond to the survey to let us know how we are doing.      If you have questions or concerns, please contact us via [employer] or you can contact your care team at [phone].    Our Clinic hours are:  Monday 6:40 am  to 7:00 pm  Tuesday -Friday 6:40 am to 5:00 pm    The [place] outpatient lab hours are:  Monday - Friday 6:10 am to 4:45 pm  Saturdays 7:00 am to 11:00 am  Appointments are required, call [phone]    If you have clinical questions after hours or would like to schedule an appointment,  call the clinic at [phone].

## 2019-09-30 NOTE — NURSING NOTE
Prior to immunization administration, verified patients identity using patient s name and date of birth. Please see Immunization Activity for additional information.     Screening Questionnaire for Adult Immunization    Are you sick today?   No   Do you have allergies to medications, food, a vaccine component or latex?   No   Have you ever had a serious reaction after receiving a vaccination?   No   Do you have a long-term health problem with heart disease, lung disease, asthma, kidney disease, metabolic disease (e.g. diabetes), anemia, or other blood disorder?   Yes   Do you have cancer, leukemia, HIV/AIDS, or any other immune system problem?   No   In the past 3 months, have you taken medications that affect  your immune system, such as prednisone, other steroids, or anticancer drugs; drugs for the treatment of rheumatoid arthritis, Crohn s disease, or psoriasis; or have you had radiation treatments?   No   Have you had a seizure, or a brain or other nervous system problem?   No   During the past year, have you received a transfusion of blood or blood     products, or been given immune (gamma) globulin or antiviral drug?   No   For women: Are you pregnant or is there a chance you could become        pregnant during the next month?   No   Have you received any vaccinations in the past 4 weeks?   Yes  Flu shot     Immunization questionnaire was positive for at least one answer.  Notified Dr. Zhao Santacruz, not contraindicated.      Per orders of Dr. Zhao Santacruz, injection of PPSV23 given by Yulia Nichols CMA.   Patient instructed to remain in clinic for 15 minutes afterwards, and to report any adverse reaction to me immediately.       Screening performed by Yulia Nichols CMA on 9/30/2019 at 11:54 AM.

## 2019-09-30 NOTE — PROGRESS NOTES
"Subjective     Andrei Mckenzie is a 53 year old male who presents to clinic today for the following health issues:    HPI   Hypertension Follow-up      Do you check your blood pressure regularly outside of the clinic? Yes     Are you following a low salt diet? Yes    Are your blood pressures ever more than 140 on the top number (systolic) OR more   than 90 on the bottom number (diastolic), for example 140/90? No      How many servings of fruits and vegetables do you eat daily?  2-3    On average, how many sweetened beverages do you drink each day (soda, juice, sweet tea, etc)?   0    How many days per week do you miss taking your medication? 0    Andrei has started to smoke again.  He is thinking about quitting.  He is smoking and using nicotine gum when he cannot smoke.      He is following more a plant based diet.     He wants to have his psa checked.  His chiropractor has said that should stiffness, back and thing stiffness could be caused by enlarged prostate.  He has no symptoms of nocturia, dribbling, decrease urinary flow, etc.          Reviewed and updated as needed this visit by Provider         Review of Systems   ROS COMP: CONSTITUTIONAL:NEGATIVE for fever, chills, change in weight.  He is doing the plant based diet  INTEGUMENTARY/SKIN: NEGATIVE for worrisome rashes, moles or lesions  RESP:NEGATIVE for significant cough or SOB  CV: NEGATIVE for chest pain, palpitations or peripheral edema  MUSCULOSKELETAL: has some muscle aches but tolerating medication of crestor  ENDOCRINE: NEGATIVE for temperature intolerance, skin/hair changes  HEME/ALLERGY/IMMUNE: NEGATIVE for bleeding problems      Objective    /70 (Cuff Size: Adult Large)   Pulse 65   Temp 97.4  F (36.3  C) (Tympanic)   Ht 1.803 m (5' 11\")   Wt 88.9 kg (196 lb)   SpO2 96%   BMI 27.34 kg/m    Body mass index is 27.34 kg/m .  Physical Exam   GENERAL APPEARANCE: alert, no distress and cooperative  RESP: lungs clear to auscultation - no rales, " "rhonchi or wheezes  CV: regular rates and rhythm, normal S1 S2, no S3 or S4 and no murmur, click or rub  MS: extremities normal- no gross deformities noted  SKIN: no suspicious lesions or rashes  NEURO: Normal strength and tone, mentation intact and speech normal  PSYCH: mentation appears normal and affect normal/bright            Assessment & Plan     (I25.10,  I25.83) Coronary artery disease due to lipid rich plaque  (primary encounter diagnosis)  Comment: discussed need to follow up with cards, continue plavix, need to quit tobacco etc.  Plan:     (E78.5) Hyperlipidemia LDL goal <70  Comment:   Plan:     (Z72.0) Tobacco abuse  Comment: needs to quit  Plan: rosuvastatin (CRESTOR) 20 MG tablet            (Z82.49) FH: premature coronary heart disease  Comment:   Plan: rosuvastatin (CRESTOR) 20 MG tablet            (E78.1) Hypertriglyceridemia  Comment:   Plan: rosuvastatin (CRESTOR) 20 MG tablet              (Z12.5) Screening for prostate cancer  Comment:   Plan:      Tobacco Cessation:   reports that he has been smoking cigarettes and dip, chew, snus or snuff. He has a 35.00 pack-year smoking history. He has never used smokeless tobacco.  Tobacco Cessation Action Plan: Information offered: Patient not interested at this time      BMI:   Estimated body mass index is 27.34 kg/m  as calculated from the following:    Height as of this encounter: 1.803 m (5' 11\").    Weight as of this encounter: 88.9 kg (196 lb).           See Patient Instructions    Return in about 5 months (around 2/29/2020) for please see cardiology in 2/2020.    Joel Santacruz MD  [facility]      "

## 2019-11-03 ENCOUNTER — HEALTH MAINTENANCE LETTER (OUTPATIENT)
Age: 54
End: 2019-11-03

## 2020-01-03 DIAGNOSIS — I25.83 CORONARY ARTERY DISEASE DUE TO LIPID RICH PLAQUE: Primary | ICD-10-CM

## 2020-01-03 DIAGNOSIS — I25.10 CORONARY ARTERY DISEASE DUE TO LIPID RICH PLAQUE: Primary | ICD-10-CM

## 2020-01-10 ENCOUNTER — HOSPITAL ENCOUNTER (OUTPATIENT)
Dept: CARDIOLOGY | Facility: CLINIC | Age: 55
Discharge: HOME | End: 2020-01-10
Attending: INTERNAL MEDICINE | Admitting: INTERNAL MEDICINE
Payer: COMMERCIAL

## 2020-01-10 DIAGNOSIS — I25.83 CORONARY ARTERY DISEASE DUE TO LIPID RICH PLAQUE: ICD-10-CM

## 2020-01-10 DIAGNOSIS — I25.10 CORONARY ARTERY DISEASE DUE TO LIPID RICH PLAQUE: ICD-10-CM

## 2020-01-10 LAB — GLUCOSE SERPL-MCNC: 104 MG/DL (ref 70–99)

## 2020-01-10 PROCEDURE — 93016 CV STRESS TEST SUPVJ ONLY: CPT | Performed by: FAMILY MEDICINE

## 2020-01-10 PROCEDURE — 93321 DOPPLER ECHO F-UP/LMTD STD: CPT | Mod: 26 | Performed by: INTERNAL MEDICINE

## 2020-01-10 PROCEDURE — 36415 COLL VENOUS BLD VENIPUNCTURE: CPT | Performed by: INTERNAL MEDICINE

## 2020-01-10 PROCEDURE — 93350 STRESS TTE ONLY: CPT | Mod: 26 | Performed by: INTERNAL MEDICINE

## 2020-01-10 PROCEDURE — 93325 DOPPLER ECHO COLOR FLOW MAPG: CPT | Mod: 26 | Performed by: INTERNAL MEDICINE

## 2020-01-10 PROCEDURE — 93018 CV STRESS TEST I&R ONLY: CPT | Performed by: FAMILY MEDICINE

## 2020-01-10 PROCEDURE — 93325 DOPPLER ECHO COLOR FLOW MAPG: CPT | Mod: TC

## 2020-01-10 PROCEDURE — 82947 ASSAY GLUCOSE BLOOD QUANT: CPT | Performed by: INTERNAL MEDICINE

## 2020-01-10 PROCEDURE — 25500064 ZZH RX 255 OP 636: Performed by: INTERNAL MEDICINE

## 2020-01-10 RX ADMIN — HUMAN ALBUMIN MICROSPHERES AND PERFLUTREN 2 ML: 10; .22 INJECTION, SOLUTION INTRAVENOUS at 08:18

## 2020-01-10 NOTE — RESULT ENCOUNTER NOTE
Results noted: no evidence of stress induced ischemia; WMAs as described; frequent PVCs and triplets during recovery. To be discussed at OV with Dr Rodriguez on 1/14/20.

## 2020-01-13 NOTE — PROGRESS NOTES
CARDIOLOGY VISIT    REASON FOR VISIT: CAD    SUBJECTIVE:  54-year-old male seen for follow-up of coronary artery disease.  He also has dyslipidemia, tobacco abuse, and mild ascending aortic enlargement.      In 2015 he had a calcium score of 721.  He had normal stress echo was in 2015 and 2017.      February 2019 he had inferior myocardial infarction with 100% RCA occlusion and 90% distal RCA lesion treated with 2 drug-eluting stents.  Left main was normal, LAD had up to 40% mid lesion, circumflex had minimal disease.  It was felt this was a delayed presentation of an MI, troponin was 11, then trended down.  Echo showed EF 50 to 55% with basal inferior hypokinesis, normal RV, no valve disease, aorta 3.9 cm.      Echo May 2019 showed EF 50 to 55%, basal inferior hypokinesis, normal RV, no valve disease, aorta 3.9 cm.     Stress echo January 2020 showed 13 minutes, no chest pain, 99% max heart rate, no EKG changes, frequent PVCs and triplets in recovery, basal inferior and inferolateral hypokinesis, no change with stress.     He has been doing well in the past 1 year.  He is very compliant with a whole food plant-based diet.  He does a lot of physical activity including walking his dog up to 1 mile per day.  He has no chest pain, shortness of breath, palpitations, lightheadedness, or syncope.    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    MEDICATIONS:  Current Outpatient Medications   Medication     aspirin 81 MG tablet     Cholecalciferol (VITAMIN D3) 5000 UNITS TBDP     clopidogrel (PLAVIX) 75 MG tablet     coenzyme Q-10 100 MG CAPS     ketotifen (ZADITOR/REFRESH ANTI-ITCH) 0.025 % ophthalmic solution     magnesium 100 MG TABS     Menaquinone-7 100 MCG CAPS     metoprolol succinate ER (TOPROL-XL) 25 MG 24 hr tablet     Multiple Vitamins-Minerals (MULTIVITAL PO)     nitroGLYcerin (NITROSTAT) 0.4 MG sublingual tablet     rosuvastatin (CRESTOR) 20 MG tablet     Saw Palmetto, Serenoa repens, (SAW PALMETTO PO)      No current facility-administered medications for this visit.        ALLERGIES:  Allergies   Allergen Reactions     Fenofibrate      He has muscle aches.     Hmg-Coa-R Inhibitors      He did not tolerate.     Mildew      Mold      Penicillins Nausea and Vomiting       REVIEW OF SYSTEMS:  Constitutional:  No weight loss, fever, chills, weakness or fatigue.  HEENT:  Eyes:  No visual loss, blurred vision, double vision or yellow sclerae. No hearing loss, sneezing, congestion, runny nose or sore throat.  Skin:  No rash or itching.  Cardiovascular: per HPI  Respiratory: per HPI  GI:  No anorexia, nausea, vomiting or diarrhea. No abdominal pain or blood.  :  No dysurea, hematuria  Neurologic:  No headache, dizziness, syncope, paralysis, ataxia, numbness or tingling in the extremities. No change in bowel or bladder control.  Musculoskeletal:  No muscle, back pain, joint pain or stiffness.  Hematologic:  No anemia, bleeding or bruising.  Lymphatics:  No enlarged nodes. No history of splenectomy.  Psychiatric:  No history of depression or anxiety.  Endocrine:  No reports of sweating, cold or heat intolerance. No polyuria or polydipsia.  Allergies:  No history of asthma, hives, eczema or rhinitis.    PHYSICAL EXAM:  /72 (BP Location: Right arm, Patient Position: Sitting, Cuff Size: Adult Regular)   Pulse 80   Wt 90 kg (198 lb 6.4 oz)   SpO2 96%   BMI 27.67 kg/m    Constitutional: awake, alert, no distress  Eyes: PERRL, sclera nonicteric  ENT: trachea midline  Respiratory: Lungs clear  Cardiovascular: Regular rate and rhythm, no murmurs  GI: nondistended, nontender, bowel sounds present  Lymph/Hematologic: no lymphadenopathy  Skin: dry, no rash  Musculoskeletal: good muscle tone, strength 5/5 in upper and lower extremities  Neurologic: no focal deficits  Neuropsychiatric: appropriate affact    DATA:  Lab:   Recent Labs   Lab Test 05/23/19  0623 02/25/19  0557  09/25/15  0709 04/10/15  0624   CHOL 152 155   < >  164 142   HDL 39* 30*   < > 36* 36*   LDL 78 98   < > 101 83   TRIG 176* 133   < > 133 117   CHOLHDLRATIO  --   --   --  4.6 3.9    < > = values in this interval not displayed.     ASSESSMENT:  54-year-old male seen for follow-up of coronary artery disease.  He is almost 1 year out from his stents.  He is doing well with no concerning clinical symptoms.  He will continue clopidogrel through February, then discontinue it.  He will stay on aspirin indefinitely.  Lipids have improved dramatically with lifestyle modification and a statin.  He will come in to recheck fasting lipids.    Per FAA requirements, he underwent a treadmill stress echo.  He had an above average workload, he had no chest pain, EKG showed no ischemic changes, and there were no new wall motion abnormalities to suggest new ischemia on his stress echo.  This should meet the FAA requirements.    RECOMMENDATIONS:  1.  Coronary artery disease  -Take clopidogrel through February, then discontinue, continue aspirin 81 mg daily indefinitely  -Continue metoprolol and rosuvastatin    2.  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    Follow-up in 1 year after stress test.    Mango Rodriguez MD  Cardiology - [facility]  Pager:  [phone]  Text Page  January 13, 2020

## 2020-01-14 ENCOUNTER — OFFICE VISIT (OUTPATIENT)
Dept: CARDIOLOGY | Facility: CLINIC | Age: 55
End: 2020-01-14
Attending: INTERNAL MEDICINE
Payer: COMMERCIAL

## 2020-01-14 VITALS
WEIGHT: 198.4 LBS | OXYGEN SATURATION: 96 % | BODY MASS INDEX: 27.67 KG/M2 | HEART RATE: 80 BPM | DIASTOLIC BLOOD PRESSURE: 72 MMHG | SYSTOLIC BLOOD PRESSURE: 118 MMHG

## 2020-01-14 DIAGNOSIS — I25.10 CORONARY ARTERY DISEASE INVOLVING NATIVE CORONARY ARTERY OF NATIVE HEART WITHOUT ANGINA PECTORIS: ICD-10-CM

## 2020-01-14 PROCEDURE — 99214 OFFICE O/P EST MOD 30 MIN: CPT | Performed by: INTERNAL MEDICINE

## 2020-01-14 NOTE — LETTER
1/14/2020    Joel Santacruz MD  [address] [facility] [address]    RE: Andrei BOYER Jonah       Dear Colleague,    I had the pleasure of seeing Andrei Mckenzie in the [facility].    CARDIOLOGY VISIT    REASON FOR VISIT: CAD    SUBJECTIVE:  54-year-old male seen for follow-up of coronary artery disease.  He also has dyslipidemia, tobacco abuse, and mild ascending aortic enlargement.      In 2015 he had a calcium score of 721.  He had normal stress echo was in 2015 and 2017.      February 2019 he had inferior myocardial infarction with 100% RCA occlusion and 90% distal RCA lesion treated with 2 drug-eluting stents.  Left main was normal, LAD had up to 40% mid lesion, circumflex had minimal disease.  It was felt this was a delayed presentation of an MI, troponin was 11, then trended down.  Echo showed EF 50 to 55% with basal inferior hypokinesis, normal RV, no valve disease, aorta 3.9 cm.      Echo May 2019 showed EF 50 to 55%, basal inferior hypokinesis, normal RV, no valve disease, aorta 3.9 cm.     Stress echo January 2020 showed 13 minutes, no chest pain, 99% max heart rate, no EKG changes, frequent PVCs and triplets in recovery, basal inferior and inferolateral hypokinesis, no change with stress.     He has been doing well in the past 1 year.  He is very compliant with a whole food plant-based diet.  He does a lot of physical activity including walking his dog up to 1 mile per day.  He has no chest pain, shortness of breath, palpitations, lightheadedness, or syncope.    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    MEDICATIONS:  Current Outpatient Medications   Medication     aspirin 81 MG tablet     Cholecalciferol (VITAMIN D3) 5000 UNITS TBDP     clopidogrel (PLAVIX) 75 MG tablet     coenzyme Q-10 100 MG CAPS     ketotifen (ZADITOR/REFRESH ANTI-ITCH) 0.025 % ophthalmic solution     magnesium 100 MG TABS     Menaquinone-7 100 MCG CAPS     metoprolol succinate ER (TOPROL-XL)  25 MG 24 hr tablet     Multiple Vitamins-Minerals (MULTIVITAL PO)     nitroGLYcerin (NITROSTAT) 0.4 MG sublingual tablet     rosuvastatin (CRESTOR) 20 MG tablet     Saw Palmetto, Serenoa repens, (SAW PALMETTO PO)     No current facility-administered medications for this visit.        ALLERGIES:  Allergies   Allergen Reactions     Fenofibrate      He has muscle aches.     Hmg-Coa-R Inhibitors      He did not tolerate.     Mildew      Mold      Penicillins Nausea and Vomiting       REVIEW OF SYSTEMS:  Constitutional:  No weight loss, fever, chills, weakness or fatigue.  HEENT:  Eyes:  No visual loss, blurred vision, double vision or yellow sclerae. No hearing loss, sneezing, congestion, runny nose or sore throat.  Skin:  No rash or itching.  Cardiovascular: per HPI  Respiratory: per HPI  GI:  No anorexia, nausea, vomiting or diarrhea. No abdominal pain or blood.  :  No dysurea, hematuria  Neurologic:  No headache, dizziness, syncope, paralysis, ataxia, numbness or tingling in the extremities. No change in bowel or bladder control.  Musculoskeletal:  No muscle, back pain, joint pain or stiffness.  Hematologic:  No anemia, bleeding or bruising.  Lymphatics:  No enlarged nodes. No history of splenectomy.  Psychiatric:  No history of depression or anxiety.  Endocrine:  No reports of sweating, cold or heat intolerance. No polyuria or polydipsia.  Allergies:  No history of asthma, hives, eczema or rhinitis.    PHYSICAL EXAM:  /72 (BP Location: Right arm, Patient Position: Sitting, Cuff Size: Adult Regular)   Pulse 80   Wt 90 kg (198 lb 6.4 oz)   SpO2 96%   BMI 27.67 kg/m     Constitutional: awake, alert, no distress  Eyes: PERRL, sclera nonicteric  ENT: trachea midline  Respiratory: Lungs clear  Cardiovascular: Regular rate and rhythm, no murmurs  GI: nondistended, nontender, bowel sounds present  Lymph/Hematologic: no lymphadenopathy  Skin: dry, no rash  Musculoskeletal: good muscle tone, strength 5/5 in upper  and lower extremities  Neurologic: no focal deficits  Neuropsychiatric: appropriate affact    DATA:  Lab:   Recent Labs   Lab Test 05/23/19  0623 02/25/19  0557  09/25/15  0709 04/10/15  0624   CHOL 152 155   < > 164 142   HDL 39* 30*   < > 36* 36*   LDL 78 98   < > 101 83   TRIG 176* 133   < > 133 117   CHOLHDLRATIO  --   --   --  4.6 3.9    < > = values in this interval not displayed.     ASSESSMENT:  54-year-old male seen for follow-up of coronary artery disease.  He is almost 1 year out from his stents.  He is doing well with no concerning clinical symptoms.  He will continue clopidogrel through February, then discontinue it.  He will stay on aspirin indefinitely.  Lipids have improved dramatically with lifestyle modification and a statin.  He will come in to recheck fasting lipids.    Per FAA requirements, he underwent a treadmill stress echo.  He had an above average workload, he had no chest pain, EKG showed no ischemic changes, and there were no new wall motion abnormalities to suggest new ischemia on his stress echo.  This should meet the FAA requirements.    RECOMMENDATIONS:  1.  Coronary artery disease  -Take clopidogrel through February, then discontinue, continue aspirin 81 mg daily indefinitely  -Continue metoprolol and rosuvastatin    2.  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    Follow-up in 1 year after stress test.    Mango Rodriguez MD  Cardiology - [facility]  Pager:  [phone]  Text Page  January 13, 2020      Thank you for allowing me to participate in the care of your patient.    Sincerely,     Mango Rodriguez MD     [facility]     No

## 2020-01-14 NOTE — PATIENT INSTRUCTIONS
"1. Continue clopidogrel (Plavix) through February, then stop it.  Cont aspirin indefinitely.    2. Check fasting lipids.      [facility]~[address]~[place], [place]~[address]  Thank you for your  Heart Care visit today. If you have questions regarding your visit, please contact your cardiology RN, Karla Rick & Paloma Winn, at [phone].    Please arrive 15 minutes early to all cardiology appointments.    To schedule a future appointment, we kindly ask that you call cardiology scheduling at [phone] three months prior to requested revisit date.  [facility] cardiology clinic is staffed with \"Advance Practice Providers\". These are our cardiology Physician Assistants and Nurse Practitioners.   Please call cardiology scheduling if you feel you need clinical evaluation with them at any time for any cardiac reason.     Reminder:  For your safety, we ask that you bring in your current medication(s) or an updated list of your medications with you to EACH office visit. Include the medication name, dose of pill on bottle and how you are taking it. Include over-the-counter medications or supplements. Your provider will review this at each visit and plan your care based on your current information.   ~~~~~~~~~~~~~~~~~~~~~~~~~~~~~~~~~~~~~~~  \"[facility]\" [place] telephone numbers for reference:  Cardiology Scheduling~[phone]  Diagnostic Imaging Scheduling~[phone]  Lab Scheduling~[phone]  Anticoagulation Clinic~[phone]  Cardiac Rehabilitation~[phone]  CORE Clinic RN's~[phone] (at [facility])  [facility] Team [phone] (at [facility])  Cardiology Clinic RN's~[phone] (Karla Winn, RN)  ~~~~~~~~~~~~~~~~~~~~~~~~~~~~~~~~~~~~~~~~        "

## 2020-03-02 ENCOUNTER — TELEPHONE (OUTPATIENT)
Dept: CARDIOLOGY | Facility: CLINIC | Age: 55
End: 2020-03-02

## 2020-03-02 NOTE — TELEPHONE ENCOUNTER
Pt called asking when he can go to dentist as he is stopping his plavix.    Per Last OV note: Take clopidogrel through February, then discontinue, continue aspirin 81 mg daily indefinitely.    Advised pt can go to dentist after stopping Plavix.  NOE JERRY RN on 3/2/2020 at 11:22 AM

## 2020-05-14 ENCOUNTER — TRANSFERRED RECORDS (OUTPATIENT)
Dept: HEALTH INFORMATION MANAGEMENT | Facility: CLINIC | Age: 55
End: 2020-05-14

## 2020-08-17 ENCOUNTER — MYC MEDICAL ADVICE (OUTPATIENT)
Dept: FAMILY MEDICINE | Facility: CLINIC | Age: 55
End: 2020-08-17

## 2020-10-02 DIAGNOSIS — E78.1 HYPERTRIGLYCERIDEMIA: ICD-10-CM

## 2020-10-02 DIAGNOSIS — Z72.0 TOBACCO ABUSE: ICD-10-CM

## 2020-10-02 DIAGNOSIS — Z82.49 FH: PREMATURE CORONARY HEART DISEASE: ICD-10-CM

## 2020-10-02 NOTE — TELEPHONE ENCOUNTER
"Requested Prescriptions   Pending Prescriptions Disp Refills     rosuvastatin (CRESTOR) 20 MG tablet [Pharmacy Med Name: ROSUVASTATIN CALCIUM 20MG TABS] 90 tablet 3     Sig: TAKE ONE TABLET BY MOUTH ONCE DAILY       Statins Protocol Failed - 10/2/2020  9:02 AM        Failed - LDL on file in past 12 months     Recent Labs   Lab Test 05/23/19  0623   LDL 78             Failed - Recent (12 mo) or future (30 days) visit within the authorizing provider's specialty     Patient has had an office visit with the authorizing provider or a provider within the authorizing providers department within the previous 12 mos or has a future within next 30 days. See \"Patient Info\" tab in inbasket, or \"Choose Columns\" in Meds & Orders section of the refill encounter.              Passed - No abnormal creatine kinase in past 12 months     Recent Labs   Lab Test 07/13/15  1021                   Passed - Medication is active on med list        Passed - Patient is age 18 or older           metoprolol succinate ER (TOPROL-XL) 25 MG 24 hr tablet [Pharmacy Med Name: METOPROLOL SUCCINATE ER 25MG TB24] 90 tablet 3     Sig: TAKE ONE TABLET BY MOUTH ONCE DAILY       Beta-Blockers Protocol Failed - 10/2/2020  9:02 AM        Failed - Recent (12 mo) or future (30 days) visit within the authorizing provider's specialty     Patient has had an office visit with the authorizing provider or a provider within the authorizing providers department within the previous 12 mos or has a future within next 30 days. See \"Patient Info\" tab in inbasket, or \"Choose Columns\" in Meds & Orders section of the refill encounter.              Passed - Blood pressure under 140/90 in past 12 months     BP Readings from Last 3 Encounters:   01/14/20 118/72   09/30/19 108/70   05/30/19 106/78                 Passed - Patient is age 6 or older        Passed - Medication is active on med list             "

## 2020-10-05 RX ORDER — METOPROLOL SUCCINATE 25 MG/1
TABLET, EXTENDED RELEASE ORAL
Qty: 90 TABLET | Refills: 0 | Status: SHIPPED | OUTPATIENT
Start: 2020-10-05 | End: 2020-10-13

## 2020-10-05 RX ORDER — ROSUVASTATIN CALCIUM 20 MG/1
TABLET, COATED ORAL
Qty: 90 TABLET | Refills: 0 | Status: SHIPPED | OUTPATIENT
Start: 2020-10-05 | End: 2020-10-13

## 2020-10-05 NOTE — TELEPHONE ENCOUNTER
Patient is calling stating that he is completely out.   Is scheduling an appointment.  Fernanda Lockwood on 10/5/2020 at 10:40 AM

## 2020-10-06 NOTE — PROGRESS NOTES
"Andrei Mckenzie is a 54 year old male who is being evaluated via a billable video visit.      The patient has been notified of following:     \"This video visit will be conducted via a call between you and your physician/provider. We have found that certain health care needs can be provided without the need for an in-person physical exam.  This service lets us provide the care you need with a video conversation.  If a prescription is necessary we can send it directly to your pharmacy.  If lab work is needed we can place an order for that and you can then stop by our lab to have the test done at a later time.    Video visits are billed at different rates depending on your insurance coverage.  Please reach out to your insurance provider with any questions.    If during the course of the call the physician/provider feels a video visit is not appropriate, you will not be charged for this service.\"    Patient has given verbal consent for Video visit? Yes  How would you like to obtain your AVS? MyChart  If you are dropped from the video visit, the video invite should be resent to: Text to cell phone: [phone]  Will anyone else be joining your video visit? No    Subjective     Andrei Mckenzie is a 54 year old male who presents today via video visit for the following health issues:    HPI     Hyperlipidemia Follow-Up  Wondering if he should still be on this medication.    Are you regularly taking any medication or supplement to lower your cholesterol?   Yes- .    Are you having muscle aches or other side effects that you think could be caused by your cholesterol lowering medication?  No    Vascular Disease Follow-up      How often do you take nitroglycerin? Never    Do you take an aspirin every day? Yes.           Video Start Time: 1341        Review of Systems   Review Of Systems  Skin:   Eyes:   Ears/Nose/Throat:   Respiratory: No shortness of breath, dyspnea on exertion, cough, or hemoptysis  Cardiovascular: " negative  Gastrointestinal:   Genitourinary:   Musculoskeletal: negative  Neurologic:   Psychiatric: he still is smoking using nicotine at times  Hematologic/Lymphatic/Immunologic:   Endocrine:         Objective           Vitals:  No vitals were obtained today due to virtual visit.    Physical Exam     GENERAL: Healthy, alert and no distress  EYES: Eyes grossly normal to inspection.  No discharge or erythema, or obvious scleral/conjunctival abnormalities.  RESP: No audible wheeze, cough, or visible cyanosis.  No visible retractions or increased work of breathing.    SKIN: Visible skin clear. No significant rash, abnormal pigmentation or lesions.  NEURO: Cranial nerves grossly intact.  Mentation and speech appropriate for age.  PSYCH: Mentation appears normal, affect normal/bright, judgement and insight intact, normal speech and appearance well-groomed.              Assessment & Plan     Coronary artery disease due to lipid rich plaque  Stable on two meds, needs to have labs.  Reports blood pressure is low and some times presyncope, will cut him back to 12.5 mg of metoprolol xl, he will monitor blood pressure  - Basic metabolic panel; Future    Hypertriglyceridemia  Refilled med and check lab  - metoprolol succinate ER (TOPROL-XL) 25 MG 24 hr tablet; Take 1 tablet (25 mg) by mouth daily Hold on file until needed.  - rosuvastatin (CRESTOR) 20 MG tablet; Take 1 tablet (20 mg) by mouth daily Hold on file until needed.  - Lipid panel reflex to direct LDL Fasting; Future    FH: premature coronary heart disease    - metoprolol succinate ER (TOPROL-XL) 25 MG 24 hr tablet; Take 1 tablet (25 mg) by mouth daily Hold on file until needed.  - rosuvastatin (CRESTOR) 20 MG tablet; Take 1 tablet (20 mg) by mouth daily Hold on file until needed.  - Basic metabolic panel; Future    Tobacco abuse  Recommend to quit, he is not interested at this time  - rosuvastatin (CRESTOR) 20 MG tablet; Take 1 tablet (20 mg) by mouth daily Hold on  file until needed.    Screening for prostate cancer    - Prostate spec antigen screen; Future        See Patient Instructions    No follow-ups on file.    Joel Santacruz MD  [facility]      Video-Visit Details    Type of service:  Video Visit    Video End Time:1353    Originating Location (pt. Location): Home    Distant Location (provider location):  [facility]     Platform used for Video Visit: [employer]

## 2020-10-13 ENCOUNTER — VIRTUAL VISIT (OUTPATIENT)
Dept: FAMILY MEDICINE | Facility: CLINIC | Age: 55
End: 2020-10-13
Payer: COMMERCIAL

## 2020-10-13 DIAGNOSIS — E78.1 HYPERTRIGLYCERIDEMIA: ICD-10-CM

## 2020-10-13 DIAGNOSIS — Z12.5 SCREENING FOR PROSTATE CANCER: ICD-10-CM

## 2020-10-13 DIAGNOSIS — Z82.49 FH: PREMATURE CORONARY HEART DISEASE: ICD-10-CM

## 2020-10-13 DIAGNOSIS — Z72.0 TOBACCO ABUSE: ICD-10-CM

## 2020-10-13 DIAGNOSIS — I25.10 CORONARY ARTERY DISEASE DUE TO LIPID RICH PLAQUE: Primary | ICD-10-CM

## 2020-10-13 DIAGNOSIS — I25.83 CORONARY ARTERY DISEASE DUE TO LIPID RICH PLAQUE: Primary | ICD-10-CM

## 2020-10-13 PROCEDURE — 99214 OFFICE O/P EST MOD 30 MIN: CPT | Mod: 95 | Performed by: FAMILY MEDICINE

## 2020-10-13 RX ORDER — METOPROLOL SUCCINATE 25 MG/1
25 TABLET, EXTENDED RELEASE ORAL DAILY
Qty: 90 TABLET | Refills: 3 | Status: SHIPPED | OUTPATIENT
Start: 2020-10-13 | End: 2021-11-03

## 2020-10-13 RX ORDER — ROSUVASTATIN CALCIUM 20 MG/1
20 TABLET, COATED ORAL DAILY
Qty: 90 TABLET | Refills: 3 | Status: SHIPPED | OUTPATIENT
Start: 2020-10-13 | End: 2021-11-03

## 2020-10-13 NOTE — PATIENT INSTRUCTIONS
Lopez Forbes,    I have refilled your medications for the year.  Please make a fasting lab visit.  Since your report having some issues with changing positions, you could be have some presyncope meaning that your blood pressure is too low since your report at times your blood pressure is low.  Try going to metoprolol xl 25 mg taking 0.5 tab a day and monitor your blood pressure.    Sincerely,  Joel Santacruz MD

## 2020-11-12 DIAGNOSIS — Z82.49 FH: PREMATURE CORONARY HEART DISEASE: ICD-10-CM

## 2020-11-12 DIAGNOSIS — I25.83 CORONARY ARTERY DISEASE DUE TO LIPID RICH PLAQUE: ICD-10-CM

## 2020-11-12 DIAGNOSIS — E78.1 HYPERTRIGLYCERIDEMIA: ICD-10-CM

## 2020-11-12 DIAGNOSIS — Z12.5 SCREENING FOR PROSTATE CANCER: ICD-10-CM

## 2020-11-12 DIAGNOSIS — I25.10 CORONARY ARTERY DISEASE DUE TO LIPID RICH PLAQUE: ICD-10-CM

## 2020-11-12 LAB
ANION GAP SERPL CALCULATED.3IONS-SCNC: 4 MMOL/L (ref 3–14)
BUN SERPL-MCNC: 14 MG/DL (ref 7–30)
CALCIUM SERPL-MCNC: 9.3 MG/DL (ref 8.5–10.1)
CHLORIDE SERPL-SCNC: 108 MMOL/L (ref 94–109)
CHOLEST SERPL-MCNC: 174 MG/DL
CO2 SERPL-SCNC: 29 MMOL/L (ref 20–32)
CREAT SERPL-MCNC: 0.86 MG/DL (ref 0.66–1.25)
GFR SERPL CREATININE-BSD FRML MDRD: >90 ML/MIN/{1.73_M2}
GLUCOSE SERPL-MCNC: 97 MG/DL (ref 70–99)
HDLC SERPL-MCNC: 40 MG/DL
LDLC SERPL CALC-MCNC: 99 MG/DL
NONHDLC SERPL-MCNC: 134 MG/DL
POTASSIUM SERPL-SCNC: 4.4 MMOL/L (ref 3.4–5.3)
PSA SERPL-ACNC: 1.66 UG/L (ref 0–4)
SODIUM SERPL-SCNC: 141 MMOL/L (ref 133–144)
TRIGL SERPL-MCNC: 176 MG/DL

## 2020-11-12 PROCEDURE — 80061 LIPID PANEL: CPT | Performed by: FAMILY MEDICINE

## 2020-11-12 PROCEDURE — G0103 PSA SCREENING: HCPCS | Performed by: FAMILY MEDICINE

## 2020-11-12 PROCEDURE — 80048 BASIC METABOLIC PNL TOTAL CA: CPT | Performed by: FAMILY MEDICINE

## 2020-11-16 ENCOUNTER — HEALTH MAINTENANCE LETTER (OUTPATIENT)
Age: 55
End: 2020-11-16

## 2021-02-08 ENCOUNTER — MYC MEDICAL ADVICE (OUTPATIENT)
Dept: FAMILY MEDICINE | Facility: CLINIC | Age: 56
End: 2021-02-08

## 2021-02-08 DIAGNOSIS — I25.10 CORONARY ARTERY DISEASE INVOLVING NATIVE HEART WITHOUT ANGINA PECTORIS, UNSPECIFIED VESSEL OR LESION TYPE: Primary | ICD-10-CM

## 2021-02-15 ENCOUNTER — OFFICE VISIT (OUTPATIENT)
Dept: NEUROLOGY | Facility: CLINIC | Age: 56
End: 2021-02-15
Payer: COMMERCIAL

## 2021-02-15 VITALS
SYSTOLIC BLOOD PRESSURE: 135 MMHG | DIASTOLIC BLOOD PRESSURE: 89 MMHG | TEMPERATURE: 97.4 F | RESPIRATION RATE: 16 BRPM | HEART RATE: 77 BPM

## 2021-02-15 DIAGNOSIS — I25.2 HISTORY OF MYOCARDIAL INFARCTION: Primary | ICD-10-CM

## 2021-02-15 PROCEDURE — 99204 OFFICE O/P NEW MOD 45 MIN: CPT | Performed by: PSYCHIATRY & NEUROLOGY

## 2021-02-15 ASSESSMENT — MONTREAL COGNITIVE ASSESSMENT (MOCA)
10. [FLUENCY] NAME WORDS STARTING WITH DESIGNATED LETTER: 1
VISUOSPATIAL/EXECUTIVE SUBSCORE: 3
8. SERIAL SUBTRACTION OF 7S: 3
13. ORIENTATION SUBSCORE: 6
6. READ LIST OF DIGITS [FORWARD/BACKWARD]: 2
12. MEMORY INDEX SCORE: 2
11. FOR EACH PAIR OF WORDS, WHAT CATEGORY DO THEY BELONG TO (OUT OF 2): 2
7. [VIGILENCE] TAP WHEN HEARING DESIGNATED LETTER: 1
WHAT LEVEL OF EDUCATION WAS ATTAINED: 0
9. REPEAT EACH SENTENCE: 2
4. NAME EACH OF THE THREE ANIMALS SHOWN: 3
WHAT IS THE TOTAL SCORE (OUT OF 30): 25

## 2021-02-15 NOTE — PROGRESS NOTES
"INITIAL NEUROLOGY CONSULTATION    DATE OF VISIT: 2/15/2021  MRN: [id number]  PATIENT NAME: Andrei Mckenzie  YOB: 1965    REFERRING PROVIDER: Joel Santacruz    Chief Complaint   Patient presents with     New Patient     Needs form completed-- MRI.       SUBJECTIVE:                                                      HPI:   Andrei Mckenzie is a 55 year old male whom I have been asked by Dr. Santacruz to see in consultation for brain MRI to look for cerebrovascular disease. There is a recent MyChart encounter and a letter indicating that the [facility] has requested Neurology evaluation and imaging, as well as neurocognitive testing. There is a head CT from 2009, which was unremarkable.     The patient has additional history of HLD, CAD (MI in 2019, s/p placement of JAE x2), family history of premature coronary disease, gout and tobacco use.     Andrei tells me that he is just here for the MRI and Neurology evaluation. He has not specific Neurologic concerns. He says his heart attack was almost exactly 2 years ago. He says that he had symptoms for a few days prior to presenting to the emergency room. He says that there were no concerns about his brain being affected at the time of the MI, however he mentions that he was told that it was unreasonable for him to wait to seek medical attention.  No concerns about his thinking or memory since.     He says that he did cognitive testing (2 days of testing), within the past year. He says this was reviewed with his Psychiatrist. He says this was done by Dr. Ortiz Coats.  Andrei explains further that he has been part of a \"HIMS\" program for many years.  This was started as part of his efforts to abstain from alcohol use.  The letter from the [facility] indicates that his psychiatrist is through the same program.    He says that he had some previous cognitive testing done, even prior to his heart attack as a result of being a part of the HIMS program.  He mentions that his primary " care provider gave him permission to lower his metoprolol, and this helped with some fatigue he had been having but otherwise he feels at his usual baseline.  He did not feel any different after his stents were placed.    His paternal grandmother had Alzheimer's disease.    Past Medical History:   Diagnosis Date     Concussion, unspecified      Past Surgical History:   Procedure Laterality Date     C REMV JAW JOINT       CV HEART CATHETERIZATION WITH POSSIBLE INTERVENTION N/A 2/25/2019    Procedure: Heart Catheterization with possible Intervention;  Surgeon: Baudilio De La Torre MD;  Location:  HEART CARDIAC CATH LAB            aspirin 81 MG tablet, Take 1 tablet (81 mg) by mouth daily       Cholecalciferol (VITAMIN D3) 5000 UNITS TBDP, Take 10,000 Units by mouth daily        coenzyme Q-10 100 MG CAPS, Take 200 mg by mouth 2 times daily       ketotifen (ZADITOR/REFRESH ANTI-ITCH) 0.025 % ophthalmic solution, 1 drop daily as needed for itching       magnesium 100 MG TABS,        Menaquinone-7 100 MCG CAPS, Take 500 mcg by mouth daily        metoprolol succinate ER (TOPROL-XL) 25 MG 24 hr tablet, Take 1 tablet (25 mg) by mouth daily Hold on file until needed.       Multiple Vitamins-Minerals (MULTIVITAL PO), Take 1 tablet by mouth daily       nitroGLYcerin (NITROSTAT) 0.4 MG sublingual tablet, For chest pain place 1 tab under the tongue every 5 minutes for 3 doses. If symptoms persists call 911.  Hold on file until needed       OVER-THE-COUNTER, Fish Oil  Turmeric  Super B Complex  Vitamin D3       rosuvastatin (CRESTOR) 20 MG tablet, Take 1 tablet (20 mg) by mouth daily Hold on file until needed.       Saw Palmetto, Serenoa repens, (SAW PALMETTO PO), Take 1 tablet by mouth daily    No current facility-administered medications on file prior to visit.     Allergies   Allergen Reactions     Fenofibrate      He has muscle aches.     Hmg-Coa-R Inhibitors      He did not tolerate.     Mildew      Mold      Penicillins  Nausea and Vomiting        Problem (# of Occurrences) Relation (Name,Age of Onset)    Alzheimer Disease (1) Paternal Grandmother (Ivy)    C.A.D. (1) Father (Yunier)    Cerebrovascular Disease (1) Father (Yunier)    Circulatory (1) Father (Yunier)    Genitourinary Problems (1) Father (Yunier): Prostate    Heart Disease (3) Father (Yunier): pacemakers/  A-fib, Mother (Rosa): hole in her heart, Paternal Grandmother (Ivy):  A-fib    Hypertension (2) Father (Yunier), Paternal Grandmother (Ivy)    Lipids (1) Mother (Rosa)    Mental Illness (1) Mother (Rosa)    Neurologic Disorder (1) Paternal Grandmother (Ivy): dementia    Osteoporosis (1) Mother (Rosa)    Other Cancer (1) Paternal Grandmother (Ivy): Skin - top of head. Removed    Thyroid Disease (1) Mother (Rosa)        Social History     Tobacco Use     Smoking status: Former Smoker     Packs/day: 1.00     Years: 35.00     Pack years: 35.00     Types: Cigarettes, Dip, chew, snus or snuff     Smokeless tobacco: Never Used   Substance Use Topics     Alcohol use: No     Drug use: No       REVIEW OF SYSTEMS:                                                      10-point review of systems is negative except as mentioned above in HPI.     EXAM:                                                      Physical Exam:   Vitals: /89 (BP Location: Right arm, Patient Position: Sitting, Cuff Size: Adult Regular)   Pulse 77   Temp 97.4  F (36.3  C) (Tympanic)   Resp 16   BMI= There is no height or weight on file to calculate BMI.  GENERAL: NAD.  HEENT: NC/AT.   CV: RRR. S1, S2.   NECK: No bruits.  PULM: Non-labored breathing.   Neurologic:  MENTAL STATUS: Alert, attentive. Speech is fluent. Normal comprehension. MoCA: 27/30 (normal is 26 and above). Normal concentration. Adequate fund of knowledge.   CRANIAL NERVES: Discs flat. Visual fields intact to confrontation. Pupils equally, round and reactive to light. Facial sensation and movement  normal. EOM full. Hearing intact to conversation. Trapezius strength intact. Palate moves symmetrically. Tongue midline.  MOTOR: 5/5 in proximal and distal muscle groups of upper and lower extremities. Tone and bulk normal.   DTRs: Intact and symmetric in biceps, BR, patellae.  Babinski down-going bilaterally.   SENSATION: Normal light touch and pinprick. Intact proprioception at great toes. Vibration: Normal at both ankles.   COORDINATION: Normal finger nose finger. Finger tapping normal. Knee heel shin normal.  STATION AND GAIT: Romberg negative. Good postural reflexes. Casual gait and tandem normal.  Right hand-dominant.    Relevant Data:  Head CT (10.23.09):  Routine assessment includes evaluation for acute intracranial   hemorrhage, midline shift, mass, acute cortical infarct, abnormal   extra-axial collection, and hydrocephalus. The calvarium and   visualized paranasal sinuses are also assessed.       COMPARISON: None.       FINDINGS: No significant abnormality is demonstrated.       IMPRESSION: Negative noncontrast head CT.    Imaging reviewed independently by me. Agree with radiology read.    ASSESSMENT and PLAN:                                                      Assessment:     ICD-10-CM    1. History of myocardial infarction  I25.2 MR Brain w/o & w Contrast        Mr. 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  He has a history of myocardial infarction and stent placement about 2 years ago.  No subsequent cognitive concerns since the event, however he comments about having poor judgment around the time of his heart attack, and not seeking medical attention as quickly as he should have.  He says that because of this, the additional work-up has been requested.  He also reports that he already had some form of neurocognitive testing completed, however I would have to see those records to verify this.  As an alternative, I can provide the neurologic exam and we  can get an MRI of his brain, but I will not be able to fully comment on his cognition without having the formal test results.  Perhaps his psychiatrist can provide comment on that piece. Brief cognitive screen today was normal.   If the MRI is normal, we will follow-up as needed.  Andrei understands and agrees with the plan.    Andrei to follow up with Primary Care provider regarding elevated blood pressure.    Plan:  -- MRI Brain. We will notify you of the results.  -- We will request your Neuropsych/cognitive test results. Your brief cognitive screen today was in normal range.  -- Return to Neurology as needed.     Total Time: 45 minutes were spent with the patient and in chart review/documentation (as described above in Assessment and Plan) /coordinating the care.    Sindy Connelly MD  Neurology    CC: Joel Santacruz MD

## 2021-02-15 NOTE — LETTER
"    2/15/2021         RE: Andrei Mckenzie  [address]  [place] [address]        Dear Colleague,    Thank you for referring your patient, Andrei Mckenzie, to the [facility] [place]. Please see a copy of my visit note below.    INITIAL NEUROLOGY CONSULTATION    DATE OF VISIT: 2/15/2021  MRN: [id number]  PATIENT NAME: Andrei Mckenzie  YOB: 1965    REFERRING PROVIDER: Joel Santacruz    Chief Complaint   Patient presents with     New Patient     Needs form completed-- MRI.       SUBJECTIVE:                                                      HPI:   Andrei Mckenzie is a 55 year old male whom I have been asked by Dr. Santacruz to see in consultation for brain MRI to look for cerebrovascular disease. There is a recent MyChart encounter and a letter indicating that the [employer] has requested Neurology evaluation and imaging, as well as neurocognitive testing. There is a head CT from 2009, which was unremarkable.     The patient has additional history of HLD, CAD (MI in 2019, s/p placement of JAE x2), family history of premature coronary disease, gout and tobacco use.     Andrei tells me that he is just here for the MRI and Neurology evaluation. He has not specific Neurologic concerns. He says his heart attack was almost exactly 2 years ago. He says that he had symptoms for a few days prior to presenting to the emergency room. He says that there were no concerns about his brain being affected at the time of the MI, however he mentions that he was told that it was unreasonable for him to wait to seek medical attention.  No concerns about his thinking or memory since.     He says that he did cognitive testing (2 days of testing), within the past year. He says this was reviewed with his Psychiatrist. He says this was done by Dr. Ortiz Coats.  Andrei explains further that he has been part of a \"HIMS\" program for many years.  This was started as part of his efforts to abstain from alcohol " use.  The letter from the [employer] indicates that his psychiatrist is through the same program.    He says that he had some previous cognitive testing done, even prior to his heart attack as a result of being a part of the HIMS program.  He mentions that his primary care provider gave him permission to lower his metoprolol, and this helped with some fatigue he had been having but otherwise he feels at his usual baseline.  He did not feel any different after his stents were placed.    His paternal grandmother had Alzheimer's disease.    Past Medical History:   Diagnosis Date     Concussion, unspecified      Past Surgical History:   Procedure Laterality Date     C REMV JAW JOINT       CV HEART CATHETERIZATION WITH POSSIBLE INTERVENTION N/A 2/25/2019    Procedure: Heart Catheterization with possible Intervention;  Surgeon: Baudilio De La Torre MD;  Location:  HEART CARDIAC CATH LAB            aspirin 81 MG tablet, Take 1 tablet (81 mg) by mouth daily       Cholecalciferol (VITAMIN D3) 5000 UNITS TBDP, Take 10,000 Units by mouth daily        coenzyme Q-10 100 MG CAPS, Take 200 mg by mouth 2 times daily       ketotifen (ZADITOR/REFRESH ANTI-ITCH) 0.025 % ophthalmic solution, 1 drop daily as needed for itching       magnesium 100 MG TABS,        Menaquinone-7 100 MCG CAPS, Take 500 mcg by mouth daily        metoprolol succinate ER (TOPROL-XL) 25 MG 24 hr tablet, Take 1 tablet (25 mg) by mouth daily Hold on file until needed.       Multiple Vitamins-Minerals (MULTIVITAL PO), Take 1 tablet by mouth daily       nitroGLYcerin (NITROSTAT) 0.4 MG sublingual tablet, For chest pain place 1 tab under the tongue every 5 minutes for 3 doses. If symptoms persists call 911.  Hold on file until needed       OVER-THE-COUNTER, Fish Oil  Turmeric  Super B Complex  Vitamin D3       rosuvastatin (CRESTOR) 20 MG tablet, Take 1 tablet (20 mg) by mouth daily Hold on file until needed.       Saw Palmetto, Serenoa repens, (SAW PALMETTO PO), Take 1  tablet by mouth daily    No current facility-administered medications on file prior to visit.     Allergies   Allergen Reactions     Fenofibrate      He has muscle aches.     Hmg-Coa-R Inhibitors      He did not tolerate.     Mildew      Mold      Penicillins Nausea and Vomiting        Problem (# of Occurrences) Relation (Name,Age of Onset)    Alzheimer Disease (1) Paternal Grandmother (Ivy)    C.A.D. (1) Father (Yunier)    Cerebrovascular Disease (1) Father (Yunier)    Circulatory (1) Father (Yunier)    Genitourinary Problems (1) Father (Yunier): Prostate    Heart Disease (3) Father (Yunier): pacemakers/  A-fib, Mother (Rosa): hole in her heart, Paternal Grandmother (Ivy):  A-fib    Hypertension (2) Father (Yunier), Paternal Grandmother (Ivy)    Lipids (1) Mother (Rosa)    Mental Illness (1) Mother (Rosa)    Neurologic Disorder (1) Paternal Grandmother (Ivy): dementia    Osteoporosis (1) Mother (Rosa)    Other Cancer (1) Paternal Grandmother (Ivy): Skin - top of head. Removed    Thyroid Disease (1) Mother (Rosa)        Social History     Tobacco Use     Smoking status: Former Smoker     Packs/day: 1.00     Years: 35.00     Pack years: 35.00     Types: Cigarettes, Dip, chew, snus or snuff     Smokeless tobacco: Never Used   Substance Use Topics     Alcohol use: No     Drug use: No       REVIEW OF SYSTEMS:                                                      10-point review of systems is negative except as mentioned above in HPI.     EXAM:                                                      Physical Exam:   Vitals: /89 (BP Location: Right arm, Patient Position: Sitting, Cuff Size: Adult Regular)   Pulse 77   Temp 97.4  F (36.3  C) (Tympanic)   Resp 16   BMI= There is no height or weight on file to calculate BMI.  GENERAL: NAD.  HEENT: NC/AT.   CV: RRR. S1, S2.   NECK: No bruits.  PULM: Non-labored breathing.   Neurologic:  MENTAL STATUS: Alert, attentive. Speech is fluent.  Normal comprehension. MoCA: 27/30 (normal is 26 and above). Normal concentration. Adequate fund of knowledge.   CRANIAL NERVES: Discs flat. Visual fields intact to confrontation. Pupils equally, round and reactive to light. Facial sensation and movement normal. EOM full. Hearing intact to conversation. Trapezius strength intact. Palate moves symmetrically. Tongue midline.  MOTOR: 5/5 in proximal and distal muscle groups of upper and lower extremities. Tone and bulk normal.   DTRs: Intact and symmetric in biceps, BR, patellae.  Babinski down-going bilaterally.   SENSATION: Normal light touch and pinprick. Intact proprioception at great toes. Vibration: Normal at both ankles.   COORDINATION: Normal finger nose finger. Finger tapping normal. Knee heel shin normal.  STATION AND GAIT: Romberg negative. Good postural reflexes. Casual gait and tandem normal.  Right hand-dominant.    Relevant Data:  Head CT (10.23.09):  Routine assessment includes evaluation for acute intracranial   hemorrhage, midline shift, mass, acute cortical infarct, abnormal   extra-axial collection, and hydrocephalus. The calvarium and   visualized paranasal sinuses are also assessed.       COMPARISON: None.       FINDINGS: No significant abnormality is demonstrated.       IMPRESSION: Negative noncontrast head CT.    Imaging reviewed independently by me. Agree with radiology read.    ASSESSMENT and PLAN:                                                      Assessment:     ICD-10-CM    1. History of myocardial infarction  I25.2 MR Brain w/o & w Contrast        Mr. 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  He has a history of myocardial infarction and stent placement about 2 years ago.  No subsequent cognitive concerns since the event, however he comments about having poor judgment around the time of his heart attack, and not seeking medical attention as quickly as he should have.  He says that because  of this, the additional work-up has been requested.  He also reports that he already had some form of neurocognitive testing completed, however I would have to see those records to verify this.  As an alternative, I can provide the neurologic exam and we can get an MRI of his brain, but I will not be able to fully comment on his cognition without having the formal test results.  Perhaps his psychiatrist can provide comment on that piece. Brief cognitive screen today was normal.   If the MRI is normal, we will follow-up as needed.  Andrei understands and agrees with the plan.    Andrei to follow up with Primary Care provider regarding elevated blood pressure.    Plan:  -- MRI Brain. We will notify you of the results.  -- We will request your Neuropsych/cognitive test results. Your brief cognitive screen today was in normal range.  -- Return to Neurology as needed.     Total Time: 45 minutes were spent with the patient and in chart review/documentation (as described above in Assessment and Plan) /coordinating the care.    Sindy Connelly MD  Neurology    CC: Joel Santacruz MD        Again, thank you for allowing me to participate in the care of your patient.        Sincerely,        Sindy Connelly MD

## 2021-02-15 NOTE — NURSING NOTE
02/15/21  [facility] faxed to [facility], requesting recent neuropsych exam.  [phone].  Transmission confirmed via [facility].    02/18/21  Records received from Ortiz Coats, PhD- placed on Dr. Connelly' desk for review.

## 2021-02-15 NOTE — PATIENT INSTRUCTIONS
Plan:  -- MRI Brain. We will notify you of the results.  -- We will request your Neuropsych/cognitive test results. Your brief cognitive screen today was in normal range.  -- Return to Neurology as needed.

## 2021-02-17 ENCOUNTER — HOSPITAL ENCOUNTER (OUTPATIENT)
Dept: MRI IMAGING | Facility: CLINIC | Age: 56
Discharge: HOME | End: 2021-02-17
Attending: PSYCHIATRY & NEUROLOGY | Admitting: PSYCHIATRY & NEUROLOGY
Payer: COMMERCIAL

## 2021-02-17 DIAGNOSIS — I25.2 HISTORY OF MYOCARDIAL INFARCTION: ICD-10-CM

## 2021-02-17 PROCEDURE — 255N000002 HC RX 255 OP 636: Performed by: PSYCHIATRY & NEUROLOGY

## 2021-02-17 PROCEDURE — A9585 GADOBUTROL INJECTION: HCPCS | Performed by: PSYCHIATRY & NEUROLOGY

## 2021-02-17 PROCEDURE — 70553 MRI BRAIN STEM W/O & W/DYE: CPT

## 2021-02-17 RX ORDER — GADOBUTROL 604.72 MG/ML
9 INJECTION INTRAVENOUS ONCE
Status: COMPLETED | OUTPATIENT
Start: 2021-02-17 | End: 2021-02-17

## 2021-02-17 RX ADMIN — GADOBUTROL 9 ML: 604.72 INJECTION INTRAVENOUS at 13:50

## 2021-02-18 NOTE — RESULT ENCOUNTER NOTE
Please advise Andrei Mckenzie,  1965, that his brain MRI is normal.  There are few small vessel changes that are considered normal for his age.  [phone] (home)     Sindy Connelly MD

## 2021-03-18 ENCOUNTER — MYC MEDICAL ADVICE (OUTPATIENT)
Dept: FAMILY MEDICINE | Facility: CLINIC | Age: 56
End: 2021-03-18

## 2021-03-30 ENCOUNTER — IMMUNIZATION (OUTPATIENT)
Dept: FAMILY MEDICINE | Facility: CLINIC | Age: 56
End: 2021-03-30
Payer: COMMERCIAL

## 2021-03-30 PROCEDURE — 91301 PR COVID VAC MODERNA 100 MCG/0.5 ML IM: CPT

## 2021-03-30 PROCEDURE — 0011A PR COVID VAC MODERNA 100 MCG/0.5 ML IM: CPT

## 2021-04-27 ENCOUNTER — IMMUNIZATION (OUTPATIENT)
Dept: FAMILY MEDICINE | Facility: CLINIC | Age: 56
End: 2021-04-27
Attending: FAMILY MEDICINE
Payer: COMMERCIAL

## 2021-04-27 PROCEDURE — 0012A PR COVID VAC MODERNA 100 MCG/0.5 ML IM: CPT

## 2021-04-27 PROCEDURE — 91301 PR COVID VAC MODERNA 100 MCG/0.5 ML IM: CPT

## 2021-09-18 ENCOUNTER — MYC MEDICAL ADVICE (OUTPATIENT)
Dept: FAMILY MEDICINE | Facility: CLINIC | Age: 56
End: 2021-09-18

## 2021-09-18 ENCOUNTER — HEALTH MAINTENANCE LETTER (OUTPATIENT)
Age: 56
End: 2021-09-18

## 2021-09-20 NOTE — TELEPHONE ENCOUNTER
Immunization record updated and MyChart sent.  Closing encounter.    Debbie Lebron RN  [facility]

## 2021-10-20 ENCOUNTER — CARE COORDINATION (OUTPATIENT)
Dept: CARDIOLOGY | Facility: CLINIC | Age: 56
End: 2021-10-20

## 2021-10-20 DIAGNOSIS — I25.10 CORONARY ARTERY DISEASE INVOLVING NATIVE CORONARY ARTERY OF NATIVE HEART WITHOUT ANGINA PECTORIS: Primary | ICD-10-CM

## 2021-10-20 NOTE — PROGRESS NOTES
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 Per pt, the only thing the FAA requested was EKG with comments on if it has changed or not. Nothing less, nothing more. Pt tired to arrange, but was told he needed Echo. Pt again states when the FAA wants something, you give them only that and they have not requested an Echo.     Reviewed chart. Pt last seen 1/2020 by Dr. Rodriguez. Pt with hx of CAD with stent placement 2/2019. Per Dr. Rodriguez note, continue Plavix through 2/2020, then ok to stop. Stress Echo tentatively ordered assuming it will be required by FAA. Stress Echo and Cardiology follow up orders overdue from 1/2021.    Pt has not followed up since and per notes declined to schedule.     Attempted to call pt back, no answer. Left detailed message that Dr. Rodriguez note did say Stress Echo tentatively ordered assuming FAA would be requiring it. If FAA is not asking for that, that is fine not to schedule, however, he is overdue for an office visit, so would be fine arranging an EKG with MD visit. Told pt I would place order for EKG to be completed with MD visit. Left [place] scheduling phone number to arrange. Also noted if he cannot be seen in time frame needed, ok to see another provider if he prefers. [facility]    EKG order placed and notes stating to be done with OV.    Nena Spears, RN, BSN  10/20/21 at 2:49 PM

## 2021-10-28 ENCOUNTER — HOSPITAL ENCOUNTER (OUTPATIENT)
Dept: CARDIOLOGY | Facility: CLINIC | Age: 56
Discharge: HOME | End: 2021-10-28
Attending: INTERNAL MEDICINE | Admitting: INTERNAL MEDICINE
Payer: COMMERCIAL

## 2021-10-28 DIAGNOSIS — I25.10 CORONARY ARTERY DISEASE INVOLVING NATIVE CORONARY ARTERY OF NATIVE HEART WITHOUT ANGINA PECTORIS: ICD-10-CM

## 2021-10-28 PROCEDURE — 93005 ELECTROCARDIOGRAM TRACING: CPT

## 2021-10-28 PROCEDURE — 99207 EKG 12-LEAD CLINIC READ: CPT | Performed by: INTERNAL MEDICINE

## 2021-11-03 ENCOUNTER — OFFICE VISIT (OUTPATIENT)
Dept: CARDIOLOGY | Facility: CLINIC | Age: 56
End: 2021-11-03
Payer: COMMERCIAL

## 2021-11-03 VITALS
SYSTOLIC BLOOD PRESSURE: 132 MMHG | HEART RATE: 87 BPM | WEIGHT: 194 LBS | DIASTOLIC BLOOD PRESSURE: 66 MMHG | BODY MASS INDEX: 27.06 KG/M2

## 2021-11-03 DIAGNOSIS — E78.5 HYPERLIPIDEMIA LDL GOAL <70: ICD-10-CM

## 2021-11-03 DIAGNOSIS — E78.1 HYPERTRIGLYCERIDEMIA: ICD-10-CM

## 2021-11-03 DIAGNOSIS — I77.810 ASCENDING AORTA DILATATION (H): ICD-10-CM

## 2021-11-03 DIAGNOSIS — Z72.0 TOBACCO ABUSE: ICD-10-CM

## 2021-11-03 DIAGNOSIS — Z82.49 FH: PREMATURE CORONARY HEART DISEASE: ICD-10-CM

## 2021-11-03 DIAGNOSIS — I25.10 CORONARY ARTERY DISEASE INVOLVING NATIVE CORONARY ARTERY OF NATIVE HEART WITHOUT ANGINA PECTORIS: Primary | ICD-10-CM

## 2021-11-03 PROCEDURE — 99214 OFFICE O/P EST MOD 30 MIN: CPT | Performed by: NURSE PRACTITIONER

## 2021-11-03 RX ORDER — METOPROLOL SUCCINATE 25 MG/1
12.5 TABLET, EXTENDED RELEASE ORAL DAILY
Qty: 45 TABLET | Refills: 3 | Status: SHIPPED | OUTPATIENT
Start: 2021-11-03 | End: 2022-12-21

## 2021-11-03 RX ORDER — METOPROLOL SUCCINATE 25 MG/1
25 TABLET, EXTENDED RELEASE ORAL DAILY
Qty: 90 TABLET | Refills: 3 | Status: SHIPPED | OUTPATIENT
Start: 2021-11-03 | End: 2021-11-03

## 2021-11-03 RX ORDER — ROSUVASTATIN CALCIUM 20 MG/1
20 TABLET, COATED ORAL DAILY
Qty: 90 TABLET | Refills: 3 | Status: SHIPPED | OUTPATIENT
Start: 2021-11-03 | End: 2022-03-17

## 2021-11-03 NOTE — PROGRESS NOTES
Cardiology Clinic Progress Note  Andrei Mckenzie MRN# [id number]   YOB: 1965 Age: 55 year old      Primary Cardiologist:   Dr. Rodriguez           History of Presenting Illness:      Andrei Mckenzie is a pleasant 55 year old patient with a past cardiac history significant for   1. CAD  2. Hyperlipidemia  3. Ascending aortic dilatation  Past medical history significant for tobacco abuse.  Patient requires FAA clearance.    In 2015 he had a calcium score of 721.  He had normal stress echo was in 2015 and 2017.      February 2019 he had inferior myocardial infarction with 100% RCA occlusion and 90% distal RCA lesion treated with 2 drug-eluting stents.  Left main was normal, LAD had up to 40% mid lesion, circumflex had minimal disease.  It was felt this was a delayed presentation of an MI, troponin was 11, then trended down.  Echo showed EF 50 to 55% with basal inferior hypokinesis, normal RV, no valve disease, aorta 3.9 cm.  Echo May 2019 showed EF 50 to 55%, basal inferior hypokinesis, normal RV, no valve disease, aorta 3.9 cm.      Stress echo January 2020 showed 13 minutes, no chest pain, 99% max heart rate, no EKG changes, frequent PVCs and triplets in recovery, basal inferior and inferolateral hypokinesis, no change with stress.    Patient was seen in January 2020 by Dr. Rodriguez.  He was compliant with whole food plant-based diet and was walking his dog up to 1 mile daily without any anginal symptoms.  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  He recommended discontinuing Plavix after February 2020 and stay on aspirin indefinitely.  He recommended repeating stress testing in 1 year assuming it would be needed for FAA clearance again.     Pt presents today for overdue annual follow-up.  Most recent BMP and lipids were 1 year ago. EKG 10/28/2021 showing sinus rhythm with occasional PVCs ventricular rate 69 bpm.  Compared to 2019 there are no significant changes.  Results reviewed today.    Blood  pressure today is well controlled at 132/66.  He is agreeable to rechecking lipid profile as it has been 1 year.  However, if LDL is still not at goal of less than 70, he declines uptitrating his statin therapy.  He reports that for his FAA clearance he was only asked to get an EKG and does not require any stress testing.  I do not have current [facility] guidelines on cardiac clearance testing but will have my nurse contact the [facility] medical examiner this patient sees, for further information. Patient reports no chest pain, shortness of breath, PND, orthopnea, presyncope, syncope, edema, heart racing, or palpitations.        Labs:  LIPID RESULTS:  Lab Results   Component Value Date    CHOL 174 11/12/2020    HDL 40 11/12/2020    LDL 99 11/12/2020    TRIG 176 (H) 11/12/2020    CHOLHDLRATIO 4.6 09/25/2015       LIVER ENZYME RESULTS:  Lab Results   Component Value Date    AST 26 03/05/2019    ALT 65 05/23/2019       CBC RESULTS:  Lab Results   Component Value Date    WBC 8.8 02/26/2019    RBC 4.38 (L) 02/26/2019    HGB 12.1 (L) 02/26/2019    HCT 36.8 (L) 02/26/2019    MCV 84 02/26/2019    MCH 27.6 02/26/2019    MCHC 32.9 02/26/2019    RDW 14.1 02/26/2019     02/26/2019       BMP RESULTS:  Lab Results   Component Value Date     11/12/2020    POTASSIUM 4.4 11/12/2020    CHLORIDE 108 11/12/2020    CO2 29 11/12/2020    ANIONGAP 4 11/12/2020    GLC 97 11/12/2020    BUN 14 11/12/2020    CR 0.86 11/12/2020    GFRESTIMATED >90 11/12/2020    GFRESTBLACK >90 11/12/2020    TERRY 9.3 11/12/2020        A1C RESULTS:  No results found for: A1C    INR RESULTS:  Lab Results   Component Value Date    INR 1.06 02/22/2019       Results reviewed today.       Current Cardiac Medications     Aspirin 81 mg daily  Coenzyme Q 10 100 mg daily  Metoprolol XL 25 mg daily  Nitroglycerin as needed  Rosuvastatin 20 mg daily                   Assessment and Plan:       Plan    Patient Instructions   Medication Changes:  None     Recommendations:  1.  Call with questions or if FAA requires additional testing     Follow-up:  Fasting lab within 2 months (lipid/ALT)  See Dr. Rodriguez for cardiology follow up at [facility]: Nov 2022. Call in July to schedule.     Cardiology Scheduling~[phone]  Cardiology Clinic RN~[phone] (Karla Rick, ELSY)          1. CAD    Inferior MI 2019 with JAE x2 to the RCA and residual 40% LAD    No angina    Continue statin, aspirin, beta-blocker    Requires FAA clearance      2. Hyperlipidemia    Last LDL 99 on 11/2020    Continue rosuvastatin 20 mg daily    Reassess lipids and consider increasing rosuvastatin, if needed      3. Ascending aortic dilatation    3.9 cm on the echo 2020    BP control    Follow with echo 2022             Thank you for allowing me to participate in this delightful patient's care.      This note was completed in part using Dragon voice recognition software. Although reviewed after completion, some word and grammatical errors may occur.    Jojo Helms, APRN CNP, APRN, CNP           Data:   All laboratory data reviewed      Total time spent today was 36 mins, reviewing labs, testing, notes, documenting notes, and seeing patient.          Constitutional:  cooperative, alert and oriented, well developed, well nourished, in no acute distress       Skin:  warm and dry to the touch         Head:  normocephalic       Eyes:  pupils equal and round       ENT:  no pallor or cyanosis       Neck:  no stiffness       Respiratory:  clear to auscultation; normal symmetry        Cardiac: regular rate and rhythm; normal S1 and S2                 pulses full and equal       GI:  abdomen soft, nondistended     Extremities and Muscular Skeletal:  no edema        Neurological:  affect appropriate; no gross motor deficits       Psych:  Alert and Oriented x 3 , appropriate affact

## 2021-11-03 NOTE — LETTER
11/3/2021    Joel Santacruz MD  [address] [address]    RE: Andrei Mckenzie       Dear Colleague,    I had the pleasure of seeing Andrei Mckenzie in the [facility].  Cardiology Clinic Progress Note  Andrei Mckenzie MRN# [id number]   YOB: 1965 Age: 55 year old      Primary Cardiologist:   Dr. Rodriguez           History of Presenting Illness:      Andrei Mckenzie is a pleasant 55 year old patient with a past cardiac history significant for   1. CAD  2. Hyperlipidemia  3. Ascending aortic dilatation  Past medical history significant for tobacco abuse.  Patient requires FAA clearance.    In 2015 he had a calcium score of 721.  He had normal stress echo was in 2015 and 2017.      February 2019 he had inferior myocardial infarction with 100% RCA occlusion and 90% distal RCA lesion treated with 2 drug-eluting stents.  Left main was normal, LAD had up to 40% mid lesion, circumflex had minimal disease.  It was felt this was a delayed presentation of an MI, troponin was 11, then trended down.  Echo showed EF 50 to 55% with basal inferior hypokinesis, normal RV, no valve disease, aorta 3.9 cm.  Echo May 2019 showed EF 50 to 55%, basal inferior hypokinesis, normal RV, no valve disease, aorta 3.9 cm.      Stress echo January 2020 showed 13 minutes, no chest pain, 99% max heart rate, no EKG changes, frequent PVCs and triplets in recovery, basal inferior and inferolateral hypokinesis, no change with stress.    Patient was seen in January 2020 by Dr. Rodriguez.  He was compliant with whole food plant-based diet and was walking his dog up to 1 mile daily without any anginal symptoms.  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  He recommended discontinuing Plavix after February 2020 and stay on aspirin indefinitely.  He recommended repeating stress testing in 1 year assuming it would be needed for FAA clearance again.     Pt presents today for  overdue annual follow-up.  Most recent BMP and lipids were 1 year ago. EKG 10/28/2021 showing sinus rhythm with occasional PVCs ventricular rate 69 bpm.  Compared to 2019 there are no significant changes.  Results reviewed today.    Blood pressure today is well controlled at 132/66.  He is agreeable to rechecking lipid profile as it has been 1 year.  However, if LDL is still not at goal of less than 70, he declines uptitrating his statin therapy.  He reports that for his FAA clearance he was only asked to get an EKG and does not require any stress testing.  I do not have current [facility] guidelines on cardiac clearance testing but will have my nurse contact the [facility] medical examiner this patient sees, for further information. Patient reports no chest pain, shortness of breath, PND, orthopnea, presyncope, syncope, edema, heart racing, or palpitations.        Labs:  LIPID RESULTS:  Lab Results   Component Value Date    CHOL 174 11/12/2020    HDL 40 11/12/2020    LDL 99 11/12/2020    TRIG 176 (H) 11/12/2020    CHOLHDLRATIO 4.6 09/25/2015       LIVER ENZYME RESULTS:  Lab Results   Component Value Date    AST 26 03/05/2019    ALT 65 05/23/2019       CBC RESULTS:  Lab Results   Component Value Date    WBC 8.8 02/26/2019    RBC 4.38 (L) 02/26/2019    HGB 12.1 (L) 02/26/2019    HCT 36.8 (L) 02/26/2019    MCV 84 02/26/2019    MCH 27.6 02/26/2019    MCHC 32.9 02/26/2019    RDW 14.1 02/26/2019     02/26/2019       BMP RESULTS:  Lab Results   Component Value Date     11/12/2020    POTASSIUM 4.4 11/12/2020    CHLORIDE 108 11/12/2020    CO2 29 11/12/2020    ANIONGAP 4 11/12/2020    GLC 97 11/12/2020    BUN 14 11/12/2020    CR 0.86 11/12/2020    GFRESTIMATED >90 11/12/2020    GFRESTBLACK >90 11/12/2020    TERRY 9.3 11/12/2020        A1C RESULTS:  No results found for: A1C    INR RESULTS:  Lab Results   Component Value Date    INR 1.06 02/22/2019       Results reviewed today.       Current Cardiac Medications     Aspirin 81 mg  daily  Coenzyme Q 10 100 mg daily  Metoprolol XL 25 mg daily  Nitroglycerin as needed  Rosuvastatin 20 mg daily                   Assessment and Plan:       Plan    Patient Instructions   Medication Changes:  None     Recommendations:  1. Call with questions or if FAA requires additional testing     Follow-up:  Fasting lab within 2 months (lipid/ALT)  See Dr. Rodriguez for cardiology follow up at [place]: Nov 2022. Call in July to schedule.     Cardiology Scheduling~[phone]  Cardiology Clinic RN~[phone] (Karla Rick, ELSY)          1. CAD    Inferior MI 2019 with JAE x2 to the RCA and residual 40% LAD    No angina    Continue statin, aspirin, beta-blocker    Requires FAA clearance      2. Hyperlipidemia    Last LDL 99 on 11/2020    Continue rosuvastatin 20 mg daily    Reassess lipids and consider increasing rosuvastatin, if needed      3. Ascending aortic dilatation    3.9 cm on the echo 2020    BP control    Follow with echo 2022             Thank you for allowing me to participate in this delightful patient's care.      This note was completed in part using Dragon voice recognition software. Although reviewed after completion, some word and grammatical errors may occur.    Jojo Helms, APRN CNP, APRN, CNP           Data:   All laboratory data reviewed      Total time spent today was 36 mins, reviewing labs, testing, notes, documenting notes, and seeing patient.          Constitutional:  cooperative, alert and oriented, well developed, well nourished, in no acute distress       Skin:  warm and dry to the touch         Head:  normocephalic       Eyes:  pupils equal and round       ENT:  no pallor or cyanosis       Neck:  no stiffness       Respiratory:  clear to auscultation; normal symmetry        Cardiac: regular rate and rhythm; normal S1 and S2                 pulses full and equal       GI:  abdomen soft, nondistended     Extremities and Muscular Skeletal:  no edema         Neurological:  affect appropriate; no gross motor deficits       Psych:  Alert and Oriented x 3 , appropriate affact        RENATO [facility]      cc:   Mango Rodriguez MD  [facility] CARE  [address] [place] [address]

## 2021-11-03 NOTE — PATIENT INSTRUCTIONS
Medication Changes:  None     Recommendations:  1. Call with questions or if FAA requires additional testing     Follow-up:  Fasting lab within 2 months (lipid/ALT)  See Dr. Rodriguez for cardiology follow up at [facility]: Nov 2022. Call in July to schedule.     Cardiology Scheduling~[phone]  Cardiology Clinic RN~[phone] (Karla Rick, RN)

## 2021-12-24 ENCOUNTER — LAB (OUTPATIENT)
Dept: LAB | Facility: CLINIC | Age: 56
End: 2021-12-24
Payer: COMMERCIAL

## 2021-12-24 DIAGNOSIS — E78.5 HYPERLIPIDEMIA LDL GOAL <70: ICD-10-CM

## 2021-12-24 LAB
ALT SERPL W P-5'-P-CCNC: 44 U/L (ref 0–70)
CHOLEST SERPL-MCNC: 182 MG/DL
FASTING STATUS PATIENT QL REPORTED: YES
HDLC SERPL-MCNC: 35 MG/DL
LDLC SERPL CALC-MCNC: 100 MG/DL
NONHDLC SERPL-MCNC: 147 MG/DL
TRIGL SERPL-MCNC: 236 MG/DL

## 2021-12-24 PROCEDURE — 36415 COLL VENOUS BLD VENIPUNCTURE: CPT

## 2021-12-24 PROCEDURE — 80061 LIPID PANEL: CPT

## 2021-12-24 PROCEDURE — 84460 ALANINE AMINO (ALT) (SGPT): CPT

## 2022-01-05 NOTE — RESULT ENCOUNTER NOTE
Let's see if he is agreeable to increasing rosuvastatin to 40 mg daily. Fasting lab in 2 months (lipid/ALT). ALT was WNL.

## 2022-01-06 ENCOUNTER — TELEPHONE (OUTPATIENT)
Dept: LAB | Facility: CLINIC | Age: 57
End: 2022-01-06
Payer: COMMERCIAL

## 2022-01-06 DIAGNOSIS — Z82.49 FH: PREMATURE CORONARY HEART DISEASE: ICD-10-CM

## 2022-01-06 DIAGNOSIS — Z72.0 TOBACCO ABUSE: ICD-10-CM

## 2022-01-06 DIAGNOSIS — E78.1 HYPERTRIGLYCERIDEMIA: ICD-10-CM

## 2022-01-06 NOTE — TELEPHONE ENCOUNTER
----- Message from RENATO Cornejo CNP sent at 1/5/2022  5:11 PM CST -----  Let's see if he is agreeable to increasing rosuvastatin to 40 mg daily. Fasting lab in 2 months (lipid/ALT). ALT was WNL.

## 2022-01-06 NOTE — TELEPHONE ENCOUNTER
Called patient to review recommendation per Jojo Trinh.  Patient did not answer, message left to call clinic to discuss lab results.  Cordelia Ortiz RN on 1/6/2022 at 11:37 AM

## 2022-01-08 ENCOUNTER — HEALTH MAINTENANCE LETTER (OUTPATIENT)
Age: 57
End: 2022-01-08

## 2022-01-10 NOTE — TELEPHONE ENCOUNTER
Instinctiv message sent to patient, RN will await response.  Cordelia Ortiz RN on 1/10/2022 at 11:54 AM

## 2022-03-16 NOTE — TELEPHONE ENCOUNTER
RECORDS RECEIVED FROM: Internal   DATE RECEIVED: 3.17.22   NOTES STATUS DETAILS   OFFICE NOTE from referring provider    Internal 11.3.21 [facility]   OFFICE NOTE from other cardiologist    Internal 10.13.20 GREG Santacruz [place]   DISCHARGE SUMMARY from hospital        DISCHARGE REPORT from the ER       OPERATIVE REPORT        MEDICATION LIST   Internal    LABS     BMP   Internal 11.12.20   CBC   Internal 2.26.19   CMP       Lipids   Internal 5.23.19   TSH   Internal 2.23.19   DIAGNOSTIC PROCEDURES     EKG   Internal 10.28.21   Monitor Reports       IMAGING (DISC & REPORT)      Echo   Internal 1.10.20 echo stress   Stress Tests       Cath   Internal 2.25.19   MRI/MRA       CT/CTA

## 2022-03-17 ENCOUNTER — PRE VISIT (OUTPATIENT)
Dept: CARDIOLOGY | Facility: CLINIC | Age: 57
End: 2022-03-17
Payer: COMMERCIAL

## 2022-03-17 ENCOUNTER — TELEPHONE (OUTPATIENT)
Dept: FAMILY MEDICINE | Facility: CLINIC | Age: 57
End: 2022-03-17
Payer: COMMERCIAL

## 2022-03-17 ENCOUNTER — VIRTUAL VISIT (OUTPATIENT)
Dept: CARDIOLOGY | Facility: CLINIC | Age: 57
End: 2022-03-17
Attending: INTERNAL MEDICINE
Payer: COMMERCIAL

## 2022-03-17 DIAGNOSIS — Z72.0 TOBACCO ABUSE: ICD-10-CM

## 2022-03-17 DIAGNOSIS — Z82.49 FH: PREMATURE CORONARY HEART DISEASE: ICD-10-CM

## 2022-03-17 DIAGNOSIS — E78.5 HYPERLIPIDEMIA LDL GOAL <70: Primary | ICD-10-CM

## 2022-03-17 DIAGNOSIS — E78.1 HYPERTRIGLYCERIDEMIA: ICD-10-CM

## 2022-03-17 PROCEDURE — 99214 OFFICE O/P EST MOD 30 MIN: CPT | Mod: 95 | Performed by: INTERNAL MEDICINE

## 2022-03-17 RX ORDER — ROSUVASTATIN CALCIUM 40 MG/1
40 TABLET, COATED ORAL DAILY
Qty: 90 TABLET | Refills: 3 | Status: SHIPPED | OUTPATIENT
Start: 2022-03-17 | End: 2022-04-14

## 2022-03-17 NOTE — LETTER
3/17/2022      RE: Andrei Mckenzie  [address] [facility] [address]       Dear Colleague,    Thank you for the opportunity to participate in the care of your patient, Andrei Mckenzie, at the [facility] HEART [facility]. Please see a copy of my visit note below.    Patient is being evaluated via a billable video visit.   Location  Patient: home  Dr Cruz: office    Time  Start:5.30 pm  Stop: 615 pm    Video system  [facility]      HPI:     I had the privilege to evaluate and examine Mr Andrei Mckenzie, who is a 56 yr old male patient  with a past cardiac history significant for   1. CAD  2. Hyperlipidemia  3. Ascending aortic dilatation  Past medical history significant for tobacco abuse.       In 2015 he had a calcium score of 721.  He had normal stress echo was in 2015 and 2017.       February 2019 he had inferior myocardial infarction with 100% RCA occlusion and 90% distal RCA lesion treated with 2 drug-eluting stents.  Left main was normal, LAD had up to 40% mid lesion, circumflex had minimal disease.  It was felt this was a delayed presentation of an MI, troponin was 11, then trended down.  Echo showed EF 50 to 55% with basal inferior hypokinesis, normal RV, no valve disease, aorta 3.9 cm.  Echo May 2019 showed EF 50 to 55%, basal inferior hypokinesis, normal RV, no valve disease, aorta 3.9 cm.      Stress echo January 2020 showed 13 minutes, no chest pain, 99% max heart rate, no EKG changes, frequent PVCs and triplets in recovery, basal inferior and inferolateral hypokinesis, no change with stress.     Patient was seen in January 2020 by Dr. Rodriguez.  He was compliant with whole food plant-based diet and was walking his dog up to 1 mile daily without any anginal symptoms.  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        He recommended discontinuing Plavix after February 2020 and stay on aspirin indefinitely.  He recommended  repeating stress testing in 1 year assuming it would be needed for FAA clearance again.      Pt presents today for overdue annual follow-up.  Most recent BMP and lipids were 1 year ago. EKG 10/28/2021 showing sinus rhythm with occasional PVCs ventricular rate 69 bpm.  Compared to 2019 there are no significant changes.  Results reviewed today.     Patient reports no chest pain, shortness of breath, PND, orthopnea, presyncope, syncope, edema, heart racing, or palpitations.       PAST MEDICAL HISTORY:  Past Medical History:   Diagnosis Date     Concussion, unspecified        CURRENT MEDICATIONS:  Current Outpatient Medications   Medication Sig Dispense Refill     aspirin 81 MG tablet Take 1 tablet (81 mg) by mouth daily       Cholecalciferol (VITAMIN D3) 5000 UNITS TBDP Take 10,000 Units by mouth daily        coenzyme Q-10 100 MG CAPS Take 200 mg by mouth 2 times daily 120 capsule 0     magnesium 100 MG TABS        Menaquinone-7 100 MCG CAPS Take 500 mcg by mouth daily  30 capsule 0     metoprolol succinate ER (TOPROL-XL) 25 MG 24 hr tablet Take 0.5 tablets (12.5 mg) by mouth daily 45 tablet 3     Multiple Vitamins-Minerals (MULTIVITAL PO) Take 1 tablet by mouth daily       nitroGLYcerin (NITROSTAT) 0.4 MG sublingual tablet For chest pain place 1 tab under the tongue every 5 minutes for 3 doses. If symptoms persists call 911.  Hold on file until needed 25 tablet 3     rosuvastatin (CRESTOR) 20 MG tablet Take 1 tablet (20 mg) by mouth daily Hold on file until needed. 90 tablet 3     ketotifen (ZADITOR/REFRESH ANTI-ITCH) 0.025 % ophthalmic solution 1 drop daily as needed for itching       OVER-THE-COUNTER Fish Oil  Turmeric  Super B Complex  Vitamin D3       Saw Palmetto, Serenoa repens, (SAW PALMETTO PO) Take 1 tablet by mouth daily         PAST SURGICAL HISTORY:  Past Surgical History:   Procedure Laterality Date     CV HEART CATHETERIZATION WITH POSSIBLE INTERVENTION N/A 2/25/2019    Procedure: Heart Catheterization  with possible Intervention;  Surgeon: Baudilio De La Torre MD;  Location:  HEART CARDIAC CATH LAB     [facility]         ALLERGIES     Allergies   Allergen Reactions     Fenofibrate      He has muscle aches.     Mildew      Mold      Penicillins Nausea and Vomiting     Statins [Hmg-Coa-R Inhibitors]      He did not tolerate.       FAMILY HISTORY:  Family History   Problem Relation Age of Onset     Cerebrovascular Disease Father      C.A.D. Father      Genitourinary Problems Father         Prostate     Heart Disease Father         pacemakers/  A-fib     Hypertension Father      Circulatory Father      Heart Disease Mother         hole in her heart     Lipids Mother      Osteoporosis Mother      Thyroid Disease Mother      Mental Illness Mother      Heart Disease Paternal Grandmother          A-fib     Neurologic Disorder Paternal Grandmother         dementia     Hypertension Paternal Grandmother      Alzheimer Disease Paternal Grandmother      Other Cancer Paternal Grandmother         Skin - top of head. Removed       SOCIAL HISTORY:  Social History     Socioeconomic History     Marital status: Single     Spouse name: None     Number of children: None     Years of education: None     Highest education level: None   Occupational History     None   Tobacco Use     Smoking status: Current Every Day Smoker     Packs/day: 1.00     Years: 35.00     Pack years: 35.00     Types: Cigarettes, Dip, chew, snus or snuff     Smokeless tobacco: Never Used   Substance and Sexual Activity     Alcohol use: No     Drug use: No     Sexual activity: Not Currently     Partners: Female   Other Topics Concern     Parent/sibling w/ CABG, MI or angioplasty before 65F 55M? Yes     Comment: father stroke at 49yo/ Mother congentital heart   Social History Narrative     None     Social Determinants of Health     Financial Resource Strain: Not on file   Food Insecurity: Not on file   Transportation Needs: Not on file   Physical  Activity: Not on file   Stress: Not on file   Social Connections: Not on file   Intimate Partner Violence: Not on file   Housing Stability: Not on file       ROS:   Constitutional: No fever, chills, or sweats. No weight gain/loss   ENT: No visual disturbance, ear ache, epistaxis, sore throat  Allergies/Immunologic: Negative.   Respiratory: No cough, hemoptysia  Cardiovascular: As per HPI  GI: No nausea, vomiting, hematemesis, melena, or hematochezia  : No urinary frequency, dysuria, or hematuria  Integument: Negative  Psychiatric: Negative  Neuro: Negative  Endocrinology: Negative   Musculoskeletal: Negative    EXAM:  There were no vitals taken for this visit.      Labs  LIPID RESULTS:  Lab Results   Component Value Date    CHOL 182 12/24/2021    CHOL 174 11/12/2020    HDL 35 (L) 12/24/2021    HDL 40 11/12/2020     12/24/2021    LDL 99 11/12/2020    TRIG 236 (H) 12/24/2021    TRIG 176 (H) 11/12/2020    CHOLHDLRATIO 4.6 09/25/2015    NHDL 147 (H) 12/24/2021    NHDL 134 (H) 11/12/2020       LIVER ENZYME RESULTS:  Lab Results   Component Value Date    AST 26 03/05/2019    ALT 44 12/24/2021    ALT 65 05/23/2019       CBC RESULTS:  Lab Results   Component Value Date    WBC 8.8 02/26/2019    RBC 4.38 (L) 02/26/2019    HGB 12.1 (L) 02/26/2019    HCT 36.8 (L) 02/26/2019    MCV 84 02/26/2019    MCH 27.6 02/26/2019    MCHC 32.9 02/26/2019    RDW 14.1 02/26/2019     02/26/2019       BMP RESULTS:  Lab Results   Component Value Date     11/12/2020    POTASSIUM 4.4 11/12/2020    CHLORIDE 108 11/12/2020    CO2 29 11/12/2020    ANIONGAP 4 11/12/2020    GLC 97 11/12/2020    BUN 14 11/12/2020    CR 0.86 11/12/2020    GFRESTIMATED >90 11/12/2020    GFRESTBLACK >90 11/12/2020    TERRY 9.3 11/12/2020            Assessment and Plan:   We disucssed the results with patient.  We discussed the importance of a heart healthy diet and lifestyle.  We discussed starting PCSK-9 inhibitor, Repatha 140 mg subcut every 2 weeks,  because   Previously Tried and Failed: Intolerant of statins and fenofibrate--muscle aches. Currently on crestor 20 mg--pt is trying to uptitrate but has difficulties with muscle aches when doing so.        Please do not hesitate to contact me if you have any questions/concerns.     Sincerely,     Ronnie Cruz MD

## 2022-03-17 NOTE — NURSING NOTE
Chief Complaint   Patient presents with     Follow Up     Discuss Hyperlipidemia. Pt reviewed medications via [employer], pt has flagged removals on his med list, please reviews. Pt is also taking 40 mg of the Crestor, but states he does not want to be taking that amount.      Zulema Bragg, Visit Facilitator/MA.

## 2022-03-17 NOTE — PATIENT INSTRUCTIONS
March 17, 2022    Cardiology Provider You Saw During Your Visit: Dr. Cruz      Medication Changes:   -Start either praluent or repatha, whichever is covered by insurance      Follow Up:   -Labs 1 week after the 2nd injection and then again in 3 months  -Follow up with Dr. Cruz in 1 year        Follow the American Heart Association Diet and Lifestyle Recommendations:  -Limit saturated fat, trans fat, sodium, red meat, sweets and sugar-sweetened beverages. If you choose to eat red meat, compare labels and select the leanest cuts available.  -Aim for at least 150 minutes of moderate physical activity or 75 minutes of vigorous physical activity - or an equal combination of both - each week.      To Reach Us:  -During business hours: [phone], press option # 1 to schedule an appointment or to leave a message for your care team.     -After hours, weekends or holidays: [phone], press option #4 and ask to speak to the on-call cardiologist. Inform them you are a patient at the [facility].      Denice De Oliveira RN  Cardiology Care Coordinator - General Cardiology  [facility]

## 2022-03-17 NOTE — PROGRESS NOTES
Patient is being evaluated via a billable video visit.   Location  Patient: home  Dr Cruz: office    Time  Start:5.30 pm  Stop: 615 pm    Video system  [facility]      HPI:     I had the privilege to evaluate and examine Mr Andrei Mckenzie, who is a 56 yr old male patient  with a past cardiac history significant for   1. CAD  2. Hyperlipidemia  3. Ascending aortic dilatation  Past medical history significant for tobacco abuse.       In 2015 he had a calcium score of 721.  He had normal stress echo was in 2015 and 2017.       February 2019 he had inferior myocardial infarction with 100% RCA occlusion and 90% distal RCA lesion treated with 2 drug-eluting stents.  Left main was normal, LAD had up to 40% mid lesion, circumflex had minimal disease.  It was felt this was a delayed presentation of an MI, troponin was 11, then trended down.  Echo showed EF 50 to 55% with basal inferior hypokinesis, normal RV, no valve disease, aorta 3.9 cm.  Echo May 2019 showed EF 50 to 55%, basal inferior hypokinesis, normal RV, no valve disease, aorta 3.9 cm.      Stress echo January 2020 showed 13 minutes, no chest pain, 99% max heart rate, no EKG changes, frequent PVCs and triplets in recovery, basal inferior and inferolateral hypokinesis, no change with stress.     Patient was seen in January 2020 by Dr. Rodriguez.  He was compliant with whole food plant-based diet and was walking his dog up to 1 mile daily without any anginal symptoms.  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        He recommended discontinuing Plavix after February 2020 and stay on aspirin indefinitely.  He recommended repeating stress testing in 1 year assuming it would be needed for FAA clearance again.      Pt presents today for overdue annual follow-up.  Most recent BMP and lipids were 1 year ago. EKG 10/28/2021 showing sinus rhythm with occasional PVCs ventricular rate 69 bpm.  Compared to 2019 there are no significant changes.  Results reviewed  today.     Patient reports no chest pain, shortness of breath, PND, orthopnea, presyncope, syncope, edema, heart racing, or palpitations.       PAST MEDICAL HISTORY:  Past Medical History:   Diagnosis Date     Concussion, unspecified        CURRENT MEDICATIONS:  Current Outpatient Medications   Medication Sig Dispense Refill     aspirin 81 MG tablet Take 1 tablet (81 mg) by mouth daily       Cholecalciferol (VITAMIN D3) 5000 UNITS TBDP Take 10,000 Units by mouth daily        coenzyme Q-10 100 MG CAPS Take 200 mg by mouth 2 times daily 120 capsule 0     magnesium 100 MG TABS        Menaquinone-7 100 MCG CAPS Take 500 mcg by mouth daily  30 capsule 0     metoprolol succinate ER (TOPROL-XL) 25 MG 24 hr tablet Take 0.5 tablets (12.5 mg) by mouth daily 45 tablet 3     Multiple Vitamins-Minerals (MULTIVITAL PO) Take 1 tablet by mouth daily       nitroGLYcerin (NITROSTAT) 0.4 MG sublingual tablet For chest pain place 1 tab under the tongue every 5 minutes for 3 doses. If symptoms persists call 911.  Hold on file until needed 25 tablet 3     rosuvastatin (CRESTOR) 20 MG tablet Take 1 tablet (20 mg) by mouth daily Hold on file until needed. 90 tablet 3     ketotifen (ZADITOR/REFRESH ANTI-ITCH) 0.025 % ophthalmic solution 1 drop daily as needed for itching       OVER-THE-COUNTER Fish Oil  Turmeric  Super B Complex  Vitamin D3       Saw Palmetto, Serenoa repens, (SAW PALMETTO PO) Take 1 tablet by mouth daily         PAST SURGICAL HISTORY:  Past Surgical History:   Procedure Laterality Date     CV HEART CATHETERIZATION WITH POSSIBLE INTERVENTION N/A 2/25/2019    Procedure: Heart Catheterization with possible Intervention;  Surgeon: Baudilio De La Torre MD;  Location:  HEART CARDIAC CATH LAB     [facility]         ALLERGIES     Allergies   Allergen Reactions     Fenofibrate      He has muscle aches.     Mildew      Mold      Penicillins Nausea and Vomiting     Statins [Hmg-Coa-R Inhibitors]      He did not tolerate.        FAMILY HISTORY:  Family History   Problem Relation Age of Onset     Cerebrovascular Disease Father      C.A.D. Father      Genitourinary Problems Father         Prostate     Heart Disease Father         pacemakers/  A-fib     Hypertension Father      Circulatory Father      Heart Disease Mother         hole in her heart     Lipids Mother      Osteoporosis Mother      Thyroid Disease Mother      Mental Illness Mother      Heart Disease Paternal Grandmother          A-fib     Neurologic Disorder Paternal Grandmother         dementia     Hypertension Paternal Grandmother      Alzheimer Disease Paternal Grandmother      Other Cancer Paternal Grandmother         Skin - top of head. Removed       SOCIAL HISTORY:  Social History     Socioeconomic History     Marital status: Single     Spouse name: None     Number of children: None     Years of education: None     Highest education level: None   Occupational History     None   Tobacco Use     Smoking status: Current Every Day Smoker     Packs/day: 1.00     Years: 35.00     Pack years: 35.00     Types: Cigarettes, Dip, chew, snus or snuff     Smokeless tobacco: Never Used   Substance and Sexual Activity     Alcohol use: No     Drug use: No     Sexual activity: Not Currently     Partners: Female   Other Topics Concern     Parent/sibling w/ CABG, MI or angioplasty before 65F 55M? Yes     Comment: father stroke at 49yo/ Mother congentital heart   Social History Narrative     None     Social Determinants of Health     Financial Resource Strain: Not on file   Food Insecurity: Not on file   Transportation Needs: Not on file   Physical Activity: Not on file   Stress: Not on file   Social Connections: Not on file   Intimate Partner Violence: Not on file   Housing Stability: Not on file       ROS:   Constitutional: No fever, chills, or sweats. No weight gain/loss   ENT: No visual disturbance, ear ache, epistaxis, sore throat  Allergies/Immunologic: Negative.   Respiratory: No  cough, hemoptysia  Cardiovascular: As per HPI  GI: No nausea, vomiting, hematemesis, melena, or hematochezia  : No urinary frequency, dysuria, or hematuria  Integument: Negative  Psychiatric: Negative  Neuro: Negative  Endocrinology: Negative   Musculoskeletal: Negative    EXAM:  There were no vitals taken for this visit.      Labs  LIPID RESULTS:  Lab Results   Component Value Date    CHOL 182 12/24/2021    CHOL 174 11/12/2020    HDL 35 (L) 12/24/2021    HDL 40 11/12/2020     12/24/2021    LDL 99 11/12/2020    TRIG 236 (H) 12/24/2021    TRIG 176 (H) 11/12/2020    CHOLHDLRATIO 4.6 09/25/2015    NHDL 147 (H) 12/24/2021    NHDL 134 (H) 11/12/2020       LIVER ENZYME RESULTS:  Lab Results   Component Value Date    AST 26 03/05/2019    ALT 44 12/24/2021    ALT 65 05/23/2019       CBC RESULTS:  Lab Results   Component Value Date    WBC 8.8 02/26/2019    RBC 4.38 (L) 02/26/2019    HGB 12.1 (L) 02/26/2019    HCT 36.8 (L) 02/26/2019    MCV 84 02/26/2019    MCH 27.6 02/26/2019    MCHC 32.9 02/26/2019    RDW 14.1 02/26/2019     02/26/2019       BMP RESULTS:  Lab Results   Component Value Date     11/12/2020    POTASSIUM 4.4 11/12/2020    CHLORIDE 108 11/12/2020    CO2 29 11/12/2020    ANIONGAP 4 11/12/2020    GLC 97 11/12/2020    BUN 14 11/12/2020    CR 0.86 11/12/2020    GFRESTIMATED >90 11/12/2020    GFRESTBLACK >90 11/12/2020    TERRY 9.3 11/12/2020            Assessment and Plan:   We disucssed the results with patient.  We discussed the importance of a heart healthy diet and lifestyle.  We discussed starting PCSK-9 inhibitor, Repatha 140 mg subcut every 2 weeks, because   Previously Tried and Failed: Intolerant of statins and fenofibrate--muscle aches. Currently on crestor 20 mg--pt is trying to uptitrate but has difficulties with muscle aches when doing so.      Ronnie Cruz MD, PhD  Professor of Medicine  Division of Cardiology  CC  Patient Care Team:  Joel Santacruz MD as PCP -  Joel Butt MD as Assigned PCP  Sindy Reyna MD as Assigned Neuroscience Provider  Jojo Trinh APRN CNP as Assigned Heart and Vascular Provider  Ronnie Cruz MD as MD (Cardiovascular Disease)  Denice De Oliveira, RN as Specialty Care Coordinator (Cardiology)  Andrei is a 56 year old who is being evaluated via a billable video visit.

## 2022-03-18 ENCOUNTER — TELEPHONE (OUTPATIENT)
Dept: CARDIOLOGY | Facility: CLINIC | Age: 57
End: 2022-03-18
Payer: COMMERCIAL

## 2022-03-18 NOTE — TELEPHONE ENCOUNTER
Prior Authorization Retail Medication Request    Medication/Dose: Repatha 140 mg    Previously Tried and Failed: Intolerant of statins and fenofibrate--muscle aches. Currently on crestor 20 mg--pt is trying to uptitrate but has difficulties with muscle aches when doing so.  Results for RENETTA LEHMAN (MRN [id number]) as of 3/18/2022 07:23   Ref. Range 11/12/2020 07:31 2/17/2021 14:24 10/28/2021 10:10 12/24/2021 06:59   Cholesterol Latest Ref Range: <200 mg/dL 174   182   Patient Fasting > 8hrs? Unknown    Yes   HDL Cholesterol Latest Ref Range: >=40 mg/dL 40   35 (L)   LDL Cholesterol Calculated Latest Ref Range: <=100 mg/dL 99   100   Non HDL Cholesterol Latest Ref Range: <130 mg/dL 134 (H)   147 (H)   PSA Latest Ref Range: 0 - 4 ug/L 1.66      Triglycerides Latest Ref Range: <150 mg/dL 176 (H)   236 (H)     Rationale:  Pt has a history of MI with stent placement x 2. With this history, would like lipid levels to be lower to prevent future cardiovascular events, and patient has limited medication options due to statin intolerance.    Insurance Name:  [facility]  Insurance ID:  [id number]

## 2022-03-18 NOTE — TELEPHONE ENCOUNTER
Central Prior Authorization Team   Phone: [phone]    PA Initiation    Medication: Repatha supeclick 140  Insurance Company: [employer] - Phone [phone] Fax [phone]  Pharmacy Filling the Rx: [facility] [place], [place] - [address]  Filling Pharmacy Phone: [phone]  Filling Pharmacy Fax:    Start Date: 3/18/2022

## 2022-03-18 NOTE — TELEPHONE ENCOUNTER
Prior Authorization Retail Medication Request    Medication/Dose: Repatha supeclick 140  ICD code (if different than what is on RX):    Previously Tried and Failed:    Rationale:      Insurance Name:  [facility]  Insurance ID:  [id number]      Pharmacy Information (if different than what is on RX)  Name:    Phone:      Thank You!  Fawn Newberry  Certified Pharmacy Technician  [facility]  P: [phone] F:[phone]

## 2022-03-18 NOTE — TELEPHONE ENCOUNTER
Prior Authorization Approval    Authorization Effective Date: 2/16/2022  Authorization Expiration Date: 3/18/2023  Medication: Repatha supeclick 140  Approved Dose/Quantity:   Reference #:     Insurance Company: [employer] - Phone [phone] Fax [phone]  Expected CoPay:       CoPay Card Available:      Foundation Assistance Needed:    Which Pharmacy is filling the prescription (Not needed for infusion/clinic administered): [facility] [address]  Pharmacy Notified: Yes  Patient Notified: Yes

## 2022-03-29 ENCOUNTER — MYC MEDICAL ADVICE (OUTPATIENT)
Dept: CARDIOLOGY | Facility: CLINIC | Age: 57
End: 2022-03-29
Payer: COMMERCIAL

## 2022-03-30 NOTE — TELEPHONE ENCOUNTER
Dr. Cruz spoke with pt. Per Dr. 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

## 2022-04-12 ENCOUNTER — LAB (OUTPATIENT)
Dept: LAB | Facility: CLINIC | Age: 57
End: 2022-04-12
Payer: COMMERCIAL

## 2022-04-12 ENCOUNTER — MYC MEDICAL ADVICE (OUTPATIENT)
Dept: CARDIOLOGY | Facility: CLINIC | Age: 57
End: 2022-04-12

## 2022-04-12 DIAGNOSIS — E78.5 HYPERLIPIDEMIA LDL GOAL <70: ICD-10-CM

## 2022-04-12 LAB
ALBUMIN SERPL-MCNC: 4.1 G/DL (ref 3.4–5)
ALP SERPL-CCNC: 60 U/L (ref 40–150)
ALT SERPL W P-5'-P-CCNC: 56 U/L (ref 0–70)
ANION GAP SERPL CALCULATED.3IONS-SCNC: 4 MMOL/L (ref 3–14)
AST SERPL W P-5'-P-CCNC: 25 U/L (ref 0–45)
BILIRUB SERPL-MCNC: 0.5 MG/DL (ref 0.2–1.3)
BUN SERPL-MCNC: 13 MG/DL (ref 7–30)
CALCIUM SERPL-MCNC: 9.1 MG/DL (ref 8.5–10.1)
CHLORIDE BLD-SCNC: 107 MMOL/L (ref 94–109)
CHOLEST SERPL-MCNC: 81 MG/DL
CO2 SERPL-SCNC: 28 MMOL/L (ref 20–32)
CREAT SERPL-MCNC: 0.84 MG/DL (ref 0.66–1.25)
FASTING STATUS PATIENT QL REPORTED: YES
GFR SERPL CREATININE-BSD FRML MDRD: >90 ML/MIN/1.73M2
GLUCOSE BLD-MCNC: 103 MG/DL (ref 70–99)
HDLC SERPL-MCNC: 37 MG/DL
LDLC SERPL CALC-MCNC: 14 MG/DL
LDLC SERPL CALC-MCNC: 20 MG/DL
NONHDLC SERPL-MCNC: 44 MG/DL
POTASSIUM BLD-SCNC: 4.3 MMOL/L (ref 3.4–5.3)
PROT SERPL-MCNC: 7.5 G/DL (ref 6.8–8.8)
SODIUM SERPL-SCNC: 139 MMOL/L (ref 133–144)
TRIGL SERPL-MCNC: 149 MG/DL

## 2022-04-12 PROCEDURE — 80061 LIPID PANEL: CPT

## 2022-04-12 PROCEDURE — 36415 COLL VENOUS BLD VENIPUNCTURE: CPT

## 2022-04-12 PROCEDURE — 83721 ASSAY OF BLOOD LIPOPROTEIN: CPT | Mod: 59

## 2022-04-12 PROCEDURE — 80053 COMPREHEN METABOLIC PANEL: CPT

## 2022-04-14 DIAGNOSIS — Z72.0 TOBACCO ABUSE: ICD-10-CM

## 2022-04-14 DIAGNOSIS — Z82.49 FH: PREMATURE CORONARY HEART DISEASE: ICD-10-CM

## 2022-04-14 DIAGNOSIS — E78.1 HYPERTRIGLYCERIDEMIA: ICD-10-CM

## 2022-04-14 RX ORDER — ROSUVASTATIN CALCIUM 20 MG/1
20 TABLET, COATED ORAL DAILY
Qty: 90 TABLET | Refills: 3 | Status: SHIPPED | OUTPATIENT
Start: 2022-04-14 | End: 2023-02-20 | Stop reason: DRUGHIGH

## 2022-04-14 NOTE — PROGRESS NOTES
Date: 4/14/2022    Time of Call: 11:38 AM     Diagnosis:  HLD     [ TORB ] Ordering provider: Dr. Cruz  Order: Crestor 20 mg daily     Order received by: Denice De Oliveira RN       Follow-up/additional notes: [facility]

## 2022-05-11 ENCOUNTER — LAB (OUTPATIENT)
Dept: LAB | Facility: CLINIC | Age: 57
End: 2022-05-11
Payer: COMMERCIAL

## 2022-05-11 DIAGNOSIS — E78.5 HYPERLIPIDEMIA LDL GOAL <70: ICD-10-CM

## 2022-05-11 LAB
ALBUMIN SERPL-MCNC: 4.1 G/DL (ref 3.4–5)
ALP SERPL-CCNC: 60 U/L (ref 40–150)
ALT SERPL W P-5'-P-CCNC: 51 U/L (ref 0–70)
ANION GAP SERPL CALCULATED.3IONS-SCNC: 1 MMOL/L (ref 3–14)
AST SERPL W P-5'-P-CCNC: 25 U/L (ref 0–45)
BILIRUB SERPL-MCNC: 0.5 MG/DL (ref 0.2–1.3)
BUN SERPL-MCNC: 17 MG/DL (ref 7–30)
CALCIUM SERPL-MCNC: 8.9 MG/DL (ref 8.5–10.1)
CHLORIDE BLD-SCNC: 110 MMOL/L (ref 94–109)
CHOLEST SERPL-MCNC: 72 MG/DL
CO2 SERPL-SCNC: 29 MMOL/L (ref 20–32)
CREAT SERPL-MCNC: 0.83 MG/DL (ref 0.66–1.25)
FASTING STATUS PATIENT QL REPORTED: YES
GFR SERPL CREATININE-BSD FRML MDRD: >90 ML/MIN/1.73M2
GLUCOSE BLD-MCNC: 105 MG/DL (ref 70–99)
HDLC SERPL-MCNC: 36 MG/DL
LDLC SERPL CALC-MCNC: 24 MG/DL
LDLC SERPL CALC-MCNC: 7 MG/DL
NONHDLC SERPL-MCNC: 36 MG/DL
POTASSIUM BLD-SCNC: 4 MMOL/L (ref 3.4–5.3)
PROT SERPL-MCNC: 7.7 G/DL (ref 6.8–8.8)
SODIUM SERPL-SCNC: 140 MMOL/L (ref 133–144)
TRIGL SERPL-MCNC: 144 MG/DL

## 2022-05-11 PROCEDURE — 80061 LIPID PANEL: CPT

## 2022-05-11 PROCEDURE — 83721 ASSAY OF BLOOD LIPOPROTEIN: CPT | Mod: 59

## 2022-05-11 PROCEDURE — 80053 COMPREHEN METABOLIC PANEL: CPT

## 2022-05-11 PROCEDURE — 36415 COLL VENOUS BLD VENIPUNCTURE: CPT

## 2022-05-16 ENCOUNTER — HOSPITAL ENCOUNTER (OUTPATIENT)
Dept: NUCLEAR MEDICINE | Facility: CLINIC | Age: 57
Setting detail: NUCLEAR MEDICINE
Discharge: HOME | End: 2022-05-16
Attending: PREVENTIVE MEDICINE
Payer: COMMERCIAL

## 2022-05-16 ENCOUNTER — HOSPITAL ENCOUNTER (OUTPATIENT)
Dept: CARDIOLOGY | Facility: CLINIC | Age: 57
Discharge: HOME | End: 2022-05-16
Attending: PREVENTIVE MEDICINE
Payer: COMMERCIAL

## 2022-05-16 VITALS — HEIGHT: 70 IN | WEIGHT: 194 LBS | BODY MASS INDEX: 27.77 KG/M2

## 2022-05-16 DIAGNOSIS — Z02.89 ENCOUNTER FOR FEDERAL AVIATION ADMINISTRATION (FAA) EXAMINATION: ICD-10-CM

## 2022-05-16 DIAGNOSIS — I24.0 RCA OCCLUSION (H): ICD-10-CM

## 2022-05-16 LAB
CV STRESS MAX HR HE: 150
RATE PRESSURE PRODUCT: NORMAL
STRESS ANGINA INDEX: 0
STRESS ECHO BASELINE DIASTOLIC HE: 78
STRESS ECHO BASELINE HR: 62 BPM
STRESS ECHO BASELINE SYSTOLIC BP: 124
STRESS ECHO CALCULATED PERCENT HR: 90 %
STRESS ECHO LAST STRESS DIASTOLIC BP: 80
STRESS ECHO LAST STRESS SYSTOLIC BP: 165
STRESS ECHO POST ESTIMATED WORKLOAD: 13.4 METS
STRESS ECHO POST EXERCISE DUR MIN: 11 MIN
STRESS ECHO POST EXERCISE DUR SEC: 50 SEC
STRESS ECHO TARGET HR: 166

## 2022-05-16 PROCEDURE — 93016 CV STRESS TEST SUPVJ ONLY: CPT | Performed by: INTERNAL MEDICINE

## 2022-05-16 PROCEDURE — 78452 HT MUSCLE IMAGE SPECT MULT: CPT

## 2022-05-16 PROCEDURE — 93017 CV STRESS TEST TRACING ONLY: CPT

## 2022-05-16 PROCEDURE — 78452 HT MUSCLE IMAGE SPECT MULT: CPT | Mod: 26 | Performed by: INTERNAL MEDICINE

## 2022-05-16 PROCEDURE — 93018 CV STRESS TEST I&R ONLY: CPT | Performed by: INTERNAL MEDICINE

## 2022-05-16 PROCEDURE — 343N000001 HC RX 343

## 2022-05-16 PROCEDURE — A9502 TC99M TETROFOSMIN: HCPCS

## 2022-05-16 RX ADMIN — TETROFOSMIN 31.6 MCI.: 1.38 INJECTION, POWDER, LYOPHILIZED, FOR SOLUTION INTRAVENOUS at 09:30

## 2022-05-16 RX ADMIN — TETROFOSMIN 10.5 MCI.: 1.38 INJECTION, POWDER, LYOPHILIZED, FOR SOLUTION INTRAVENOUS at 07:50

## 2022-06-20 ENCOUNTER — MYC MEDICAL ADVICE (OUTPATIENT)
Dept: CARDIOLOGY | Facility: CLINIC | Age: 57
End: 2022-06-20
Payer: COMMERCIAL

## 2022-09-14 ENCOUNTER — OFFICE VISIT (OUTPATIENT)
Dept: CARDIOLOGY | Facility: CLINIC | Age: 57
End: 2022-09-14
Payer: COMMERCIAL

## 2022-09-14 VITALS — HEART RATE: 69 BPM | DIASTOLIC BLOOD PRESSURE: 81 MMHG | SYSTOLIC BLOOD PRESSURE: 125 MMHG

## 2022-09-14 DIAGNOSIS — I25.10 CORONARY ARTERY DISEASE INVOLVING NATIVE CORONARY ARTERY OF NATIVE HEART WITHOUT ANGINA PECTORIS: Primary | ICD-10-CM

## 2022-09-14 DIAGNOSIS — E78.5 HYPERLIPIDEMIA LDL GOAL <70: ICD-10-CM

## 2022-09-14 DIAGNOSIS — Z00.6 EXAMINATION OF PARTICIPANT OR CONTROL IN CLINICAL RESEARCH: ICD-10-CM

## 2022-09-14 LAB — APO A-I SERPL-MCNC: 99 MG/DL

## 2022-09-14 PROCEDURE — 83695 ASSAY OF LIPOPROTEIN(A): CPT

## 2022-09-14 PROCEDURE — 36415 COLL VENOUS BLD VENIPUNCTURE: CPT

## 2022-09-14 PROCEDURE — 99207 PR NO CHARGE-RESEARCH SERVICE: CPT

## 2022-09-14 NOTE — LETTER
9/14/2022    Joel Santacruz MD  [address] [address]    RE: Andrei Mckenzie       Dear Colleague,     I had the [facility] of [facility]      [facility]- A multicenter, Cross sectional study to characterize the Distribution of Lipoprotein(a) Levels Among Patients With Documented History of Atherosclerotic Cardiovascular Disease       Andrei Mckenzie was seen in clinic today for the screening visit.    Research nurse met with subject to discuss consent and participation in the above noted study.    The study discussion included the following:     Study purpose    Qualifications for participation    Length of study participation    Study procedures    Risks and side effects    Benefits (if any)    Voluntary nature of participation    Alternatives to participation    Confidentiality of records    Financial considerations     Subject asked questions and agreed that he received answers that satisfied him.    Consent form [Version2.2 [id number] - [employer] version [id number]] signed on 09/14/22 at 0920. Patient verbalized understanding. A signed copy was offered to the subject & forwarded to medical records. No study procedures were done prior to obtaining informed consent.      YOUSUF obtained.    Did Subject meet all inclusion criteria? Yes  Did Subject meet any Exclusion criteria? No    Lab draw performed without issue at 0933. Research staff will follow up with subject with results.      Malia Brown RN   Clinical Trials Office   [phone]      Current Outpatient Medications:      aspirin 81 MG tablet, Take 1 tablet (81 mg) by mouth daily, Disp: , Rfl:      Cholecalciferol (VITAMIN D3) 5000 UNITS TBDP, Take 10,000 Units by mouth daily , Disp: , Rfl:      coenzyme Q-10 100 MG CAPS, Take 200 mg by mouth 2 times daily, Disp: 120 capsule, Rfl: 0     evolocumab (REPATHA) 140 MG/ML prefilled autoinjector, Inject 1 mL (140 mg) Subcutaneous every 14 days, Disp: 6 mL,  Rfl: 3     magnesium 100 MG TABS, , Disp: , Rfl:      Menaquinone-7 100 MCG CAPS, Take 500 mcg by mouth daily , Disp: 30 capsule, Rfl: 0     metoprolol succinate ER (TOPROL-XL) 25 MG 24 hr tablet, Take 0.5 tablets (12.5 mg) by mouth daily, Disp: 45 tablet, Rfl: 3     Multiple Vitamins-Minerals (MULTIVITAL PO), Take 1 tablet by mouth daily, Disp: , Rfl:      nitroGLYcerin (NITROSTAT) 0.4 MG sublingual tablet, For chest pain place 1 tab under the tongue every 5 minutes for 3 doses. If symptoms persists call 911.  Hold on file until needed, Disp: 25 tablet, Rfl: 3     rosuvastatin (CRESTOR) 20 MG tablet, Take 1 tablet (20 mg) by mouth daily Hold on file until needed., Disp: 90 tablet, Rfl: 3     ketotifen (ZADITOR/REFRESH ANTI-ITCH) 0.025 % ophthalmic solution, 1 drop daily as needed for itching, Disp: , Rfl:      OVER-THE-COUNTER, Fish Oil Turmeric Super B Complex Vitamin D3, Disp: , Rfl:      Saw Palmetto, Serenoa repens, (SAW PALMETTO PO), Take 1 tablet by mouth daily, Disp: , Rfl:   Past Medical History:   Diagnosis Date     Concussion, unspecified    MI presumed type 1   Patient Vitals for the past 24 hrs:   BP Pulse   09/14/22 0921 125/81 69     Vitals: /81 (BP Location: Left arm, Patient Position: Sitting, Cuff Size: Adult Regular)   Pulse 69    male  56 year old        [facility]: A multicenter, Cross sectional study to characterize the Distribution of Lipoprotein(a) Levels Among Patients With Documented History of Atherosclerotic Cardiovascular Disease     [facility] Inclusion/Exclusion Criteria    Inclusion Criteria  All must be Yes    101 Subject has provided informed consent prior to initiation of any study specific activities/procedures. Yes   102 Age 18 to 85 years. Yes   103 History of ASCVD as demonstrated by either:  a) MI (presumed type 1)    And/or    b) PCI (with high-risk features) with at least 1 of the following:    Age > 65 years    Diabetes mellitus    History of ischemic stroke    History  of peripheral arterial disease    Residual stenosis > 50%    Multivessel PCI (ie, > 2 vessels, including branch arteries) Yes          Exclusion Criteria All must   be No   201 Subjects known to be currently receiving investigational drug in a clinical study that is anticipated to last > 1 year. No   202 Known Lp(a) value < 90 mg/dL (if measured in mass) or < 200 nmol/L (if measured in molar). No   203 Subject has a diagnosis of end-stage renal disease or requires dialysis. No   204 Poorly controlled (glycated hemoglobin [HbA1c] > 10%) diabetes mellitus (type 1 or type 2). No   205 Subject is receiving or has received lipoprotein apheresis to reduce Lp(a) within 3 months prior to enrollment. No   206 Known uncontrolled or recurrent ventricular tachycardia in the past 3 months prior to enrollment. No   207 Known malignancy (except non-melanoma skin cancers, cervical in situ carcinoma, breast ductal carcinoma in situ, or stage 1 prostate carcinoma) within the last 5 years prior to enrollment. No   208 Known history or evidence of clinically significant disease (eg, respiratory, gastrointestinal, or psychiatric disease) or unstable disorder or biomarker that, in the opinion of the investigator(s), would result in life expectancy < 5 years. No   209 Known hemorrhagic stroke. No       Participant has met all inclusion criteria and no exclusion criteria and is ready to be enrolled in the [facility] disease prevalence study.     Dr. Mcneil: Agree this patient can move to Enrollment   [x] Yes   [] No    Malia Brown RN      Thank you for allowing me to participate in the care of your patient.      Sincerely,     Malia Brown RN     [facility]  cc:   No referring provider defined for this encounter.

## 2022-09-14 NOTE — PROGRESS NOTES
[facility]- A multicenter, Cross sectional study to characterize the Distribution of Lipoprotein(a) Levels Among Patients With Documented History of Atherosclerotic Cardiovascular Disease       Andrei Mckenzie was seen in clinic today for the screening visit.    Research nurse met with subject to discuss consent and participation in the above noted study.    The study discussion included the following:   Study purpose  Qualifications for participation  Length of study participation  Study procedures  Risks and side effects  Benefits (if any)  Voluntary nature of participation  Alternatives to participation  Confidentiality of records  Financial considerations     Subject asked questions and agreed that he received answers that satisfied him.    Consent form [Version2.2 [id number] - [employer] version [id number]] signed on 09/14/22 at 0920. Patient verbalized understanding. A signed copy was offered to the subject & forwarded to medical records. No study procedures were done prior to obtaining informed consent.      YOUSUF obtained.    Did Subject meet all inclusion criteria? Yes  Did Subject meet any Exclusion criteria? No    Lab draw performed without issue at 0933. Research staff will follow up with subject with results.      Malia Brown RN   Clinical Trials Office   [phone]      Current Outpatient Medications:     aspirin 81 MG tablet, Take 1 tablet (81 mg) by mouth daily, Disp: , Rfl:     Cholecalciferol (VITAMIN D3) 5000 UNITS TBDP, Take 10,000 Units by mouth daily , Disp: , Rfl:     coenzyme Q-10 100 MG CAPS, Take 200 mg by mouth 2 times daily, Disp: 120 capsule, Rfl: 0    evolocumab (REPATHA) 140 MG/ML prefilled autoinjector, Inject 1 mL (140 mg) Subcutaneous every 14 days, Disp: 6 mL, Rfl: 3    magnesium 100 MG TABS, , Disp: , Rfl:     Menaquinone-7 100 MCG CAPS, Take 500 mcg by mouth daily , Disp: 30 capsule, Rfl: 0    metoprolol succinate ER (TOPROL-XL) 25 MG 24 hr tablet, Take 0.5 tablets (12.5 mg) by  mouth daily, Disp: 45 tablet, Rfl: 3    Multiple Vitamins-Minerals (MULTIVITAL PO), Take 1 tablet by mouth daily, Disp: , Rfl:     nitroGLYcerin (NITROSTAT) 0.4 MG sublingual tablet, For chest pain place 1 tab under the tongue every 5 minutes for 3 doses. If symptoms persists call 911.  Hold on file until needed, Disp: 25 tablet, Rfl: 3    rosuvastatin (CRESTOR) 20 MG tablet, Take 1 tablet (20 mg) by mouth daily Hold on file until needed., Disp: 90 tablet, Rfl: 3    ketotifen (ZADITOR/REFRESH ANTI-ITCH) 0.025 % ophthalmic solution, 1 drop daily as needed for itching, Disp: , Rfl:     OVER-THE-COUNTER, Fish Oil Turmeric Super B Complex Vitamin D3, Disp: , Rfl:     Saw Palmetto, Serenoa repens, (SAW PALMETTO PO), Take 1 tablet by mouth daily, Disp: , Rfl:   Past Medical History:   Diagnosis Date    Concussion, unspecified    MI presumed type 1   Patient Vitals for the past 24 hrs:   BP Pulse   09/14/22 0921 125/81 69     Vitals: /81 (BP Location: Left arm, Patient Position: Sitting, Cuff Size: Adult Regular)   Pulse 69    male  56 year old        [facility]: A multicenter, Cross sectional study to characterize the Distribution of Lipoprotein(a) Levels Among Patients With Documented History of Atherosclerotic Cardiovascular Disease     [facility] Inclusion/Exclusion Criteria    Inclusion Criteria  All must be Yes    101 Subject has provided informed consent prior to initiation of any study specific activities/procedures. Yes   102 Age 18 to 85 years. Yes   103 History of ASCVD as demonstrated by either:  MI (presumed type 1)    And/or    b) PCI (with high-risk features) with at least 1 of the following:  Age > 65 years  Diabetes mellitus  History of ischemic stroke  History of peripheral arterial disease  Residual stenosis > 50%  Multivessel PCI (ie, > 2 vessels, including branch arteries) Yes          Exclusion Criteria All must   be No   201 Subjects known to be currently receiving investigational drug in a  clinical study that is anticipated to last > 1 year. No   202 Known Lp(a) value < 90 mg/dL (if measured in mass) or < 200 nmol/L (if measured in molar). No   203 Subject has a diagnosis of end-stage renal disease or requires dialysis. No   204 Poorly controlled (glycated hemoglobin [HbA1c] > 10%) diabetes mellitus (type 1 or type 2). No   205 Subject is receiving or has received lipoprotein apheresis to reduce Lp(a) within 3 months prior to enrollment. No   206 Known uncontrolled or recurrent ventricular tachycardia in the past 3 months prior to enrollment. No   207 Known malignancy (except non-melanoma skin cancers, cervical in situ carcinoma, breast ductal carcinoma in situ, or stage 1 prostate carcinoma) within the last 5 years prior to enrollment. No   208 Known history or evidence of clinically significant disease (eg, respiratory, gastrointestinal, or psychiatric disease) or unstable disorder or biomarker that, in the opinion of the investigator(s), would result in life expectancy < 5 years. No   209 Known hemorrhagic stroke. No       Participant has met all inclusion criteria and no exclusion criteria and is ready to be enrolled [facility] the [facility].     Dr. Mcneil: Agree this patient can move to Enrollment   [x] Yes   [] No    Malia Brown RN

## 2022-09-16 ENCOUNTER — DOCUMENTATION ONLY (OUTPATIENT)
Dept: CARDIOLOGY | Facility: CLINIC | Age: 57
End: 2022-09-16
Payer: COMMERCIAL

## 2022-09-16 PROCEDURE — 99207 PR NO CHARGE-RESEARCH SERVICE: CPT

## 2022-09-16 NOTE — PROGRESS NOTES
[facility]: A multicenter, Cross sectional study to characterize the Distribution of Lipoprotein(a) Levels Among Patients With Documented History of Atherosclerotic Cardiovascular Disease     Andrei Mckenzie was called today to review results of Lp(a) drawn on 14 Sep 2022.    he was informed of the significance of the results as related to upcoming study and their potential participation.    he affirms understanding.    Andrei BOYER Jonah has no further questions at this time.    Malia Brown RN    Recent Results (from the past 240 hour(s))   Lipoprotein (a)    Collection Time: 09/14/22  9:33 AM   Result Value Ref Range    Lipoprotein (a) 99 (H) <30 mg/dL

## 2022-12-21 DIAGNOSIS — Z82.49 FH: PREMATURE CORONARY HEART DISEASE: ICD-10-CM

## 2022-12-21 DIAGNOSIS — E78.1 HYPERTRIGLYCERIDEMIA: ICD-10-CM

## 2022-12-21 RX ORDER — METOPROLOL SUCCINATE 25 MG/1
12.5 TABLET, EXTENDED RELEASE ORAL DAILY
Qty: 45 TABLET | Refills: 0 | Status: SHIPPED | OUTPATIENT
Start: 2022-12-21 | End: 2023-02-20

## 2022-12-21 NOTE — TELEPHONE ENCOUNTER
[facility] Cardiology Refill Guideline reviewed.  Medication meets criteria for refill. Due for follow up but no order was placed. Order placed and pt asked to call for appt. 90 day fill sent. Karla Rick RN Cardiology December 21, 2022, 8:41 AM'

## 2022-12-21 NOTE — TELEPHONE ENCOUNTER
Last Office Visit: 11/03/21 REID Trinh  Next Office Visit: TBD  Last Fill Date: 09/25/22  Veena Casiano MA Cardiology   12/21/2022 8:03 AM

## 2023-01-23 ENCOUNTER — TELEPHONE (OUTPATIENT)
Dept: CARDIOLOGY | Facility: CLINIC | Age: 58
End: 2023-01-23
Payer: COMMERCIAL

## 2023-01-23 NOTE — TELEPHONE ENCOUNTER
Notified pt via [employer] that he needs to either see Dr. Cruz or Dr. Rodriguez. Should see one or the other for continuation of care and management, not both. Pt stated via [employer] he wants to continue with Dr. Rodriguez. Repatha rx refilled by Dr. Rodriguez. Pt scheduled to see Dr. Rodriguez on 2/20/23 for continuation of care.     Johanna Chandra RN

## 2023-01-23 NOTE — TELEPHONE ENCOUNTER
Health Call Center    Phone Message    May a detailed message be left on voicemail: yes     Reason for Call: Other:     Pt is calling to schedule with Dr. Cruz to have repatha refilled.  He is confused by different clinics and messages he has been given.  Is repatha only refillable by Dr. Cruz?  The earliest he can see Anthony for lipid mgt is May 2023, can he schedule with Michael for lipid mgt?  Please call pt to discuss.    Action Taken: Other: cardio    Travel Screening: Not Applicable     Thank you!  [facility]

## 2023-02-15 RX ORDER — ROSUVASTATIN CALCIUM 20 MG/1
20 TABLET, COATED ORAL DAILY
Qty: 90 TABLET | Refills: 3 | Status: CANCELLED | OUTPATIENT
Start: 2023-02-15

## 2023-02-15 NOTE — PROGRESS NOTES
CARDIOLOGY VISIT    REASON FOR VISIT: CAD    SUBJECTIVE:  57-year-old male seen for follow-up of coronary artery disease.  He also has dyslipidemia, tobacco abuse, and mild ascending aortic enlargement.       2015 he had a calcium score of 721.  He had normal stress echo was in 2015 and 2017.       2019 he had inferior myocardial infarction with 100% RCA occlusion and 90% distal RCA lesion treated with 2 drug-eluting stents.  Left main was normal, LAD had up to 40% mid lesion, circumflex had minimal disease.  It was felt this was a delayed presentation of an MI, troponin was 11, then trended down.  Echo showed EF 50 to 55% with basal inferior hypokinesis, normal RV, no valve disease, aorta 3.9 cm.       Echo May 2019 showed EF 50 to 55%, basal inferior hypokinesis, normal RV, no valve disease, aorta 3.9 cm.      Stress echo January 2020 showed 13 minutes, no chest pain, 99% max heart rate, no EKG changes, frequent PVCs and triplets in recovery, basal inferior and inferolateral hypokinesis, no change with stress.      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    Nuclear stress May 2022 showed small infarct of the mid to basal inferior segment, no ischemia, EF 60%.    He has been feeling well recently.  He denies any chest pain or shortness of breath.  He has been asked to participate in a trial with a medication that lowers LP(a).    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    MEDICATIONS:  Current Outpatient Medications   Medication     aspirin 81 MG tablet     Cholecalciferol (VITAMIN D3) 5000 UNITS TBDP     coenzyme Q-10 100 MG CAPS     evolocumab (REPATHA) 140 MG/ML prefilled autoinjector     ketotifen (ZADITOR/REFRESH ANTI-ITCH) 0.025 % ophthalmic solution     magnesium 100 MG TABS     Menaquinone-7 100 MCG CAPS     metoprolol succinate ER (TOPROL XL) 25 MG 24 hr tablet     Multiple Vitamins-Minerals (MULTIVITAL PO)     nitroGLYcerin (NITROSTAT) 0.4 MG sublingual tablet      OVER-THE-COUNTER     rosuvastatin (CRESTOR) 20 MG tablet     Saw Palmetto, Serenoa repens, (SAW PALMETTO PO)     No current facility-administered medications for this visit.       ALLERGIES:  Allergies   Allergen Reactions     Fenofibrate      He has muscle aches.     Mildew      Mold      Penicillins Nausea and Vomiting     Statins [Hmg-Coa-R Inhibitors]      He did not tolerate.       REVIEW OF SYSTEMS:  Constitutional:  No weight loss, fever, chills  HEENT:  Eyes:  No visual loss, blurred vision, double vision or yellow sclerae. No hearing loss, sneezing, congestion, runny nose or sore throat.  Skin:  No rash or itching.  Cardiovascular: per HPI  Respiratory: per HPI  GI:  No anorexia, nausea, vomiting or diarrhea. No abdominal pain or blood.  :  No dysurea, hematuria  Neurologic:  No headache, paralysis, ataxia, numbness or tingling in the extremities. No change in bowel or bladder control.  Musculoskeletal:  No muscle pain  Hematologic:  No bleeding or bruising.  Lymphatics:  No enlarged nodes. No history of splenectomy.  Endocrine:  No reports of sweating, cold or heat intolerance. No polyuria or polydipsia.  Allergies:  No history of asthma, hives, eczema or rhinitis.    PHYSICAL EXAM:  /78 (BP Location: Right arm, Patient Position: Sitting, Cuff Size: Adult Regular)   Pulse 81   Resp 16   Wt 87.1 kg (192 lb)   SpO2 97%   BMI 27.55 kg/m      Constitutional: awake, alert, no distress  Eyes: PERRL, sclera nonicteric  ENT: trachea midline  Respiratory: Lungs clear  Cardiovascular: Regular rate and rhythm, no murmurs  GI: nondistended, nontender, bowel sounds present  Lymph/Hematologic: no lymphadenopathy  Skin: dry, no rash  Musculoskeletal: good muscle tone, strength 5/5 in upper and lower extremities  Neurologic: no focal deficits  Neuropsychiatric: appropriate affact    DATA:  Lab:   Recent Labs   Lab Test 05/11/22  0656 04/12/22  0707 02/10/16  0701 09/25/15  0709 04/10/15  0624   CHOL 72 81    < > 164 142   HDL 36* 37*   < > 36* 36*   LDL 24  7 20  14   < > 101 83   TRIG 144 149   < > 133 117   CHOLHDLRATIO  --   --   --  4.6 3.9    < > = values in this interval not displayed.       ASSESSMENT:  57-year-old male seen for CAD.  He has no concerning cardiac symptoms.  We mostly talked about lipids today.  He had a dramatic LDL reduction with Repatha.  He is interested in cutting back rosuvastatin dose which seems reasonable.  As for the trial medication, he probably does not need additional medications since he responded so well to Repatha.  He will probably hold off on participating in the trial.    He also asks about progression of any coronary disease.  It was explained to him that he cannot have a calcium score with a stent.  Angiogram would not be done unless he truly had symptoms.    RECOMMENDATIONS:  1.  CAD  -Decrease rosuvastatin to 5 mg once daily, continue Repatha  -Continue other medications  -Treadmill stress echo on follow-up, continue all medications for the test    Follow-up in 1 year after stress test.    Mango Rodriguez MD  Cardiology - [facility]  Pager:  [phone]  Text Page  February 20, 2023

## 2023-02-20 ENCOUNTER — OFFICE VISIT (OUTPATIENT)
Dept: CARDIOLOGY | Facility: CLINIC | Age: 58
End: 2023-02-20
Attending: NURSE PRACTITIONER
Payer: COMMERCIAL

## 2023-02-20 ENCOUNTER — LAB (OUTPATIENT)
Dept: LAB | Facility: CLINIC | Age: 58
End: 2023-02-20
Payer: COMMERCIAL

## 2023-02-20 VITALS
OXYGEN SATURATION: 97 % | HEART RATE: 81 BPM | WEIGHT: 192 LBS | RESPIRATION RATE: 16 BRPM | BODY MASS INDEX: 27.55 KG/M2 | SYSTOLIC BLOOD PRESSURE: 116 MMHG | DIASTOLIC BLOOD PRESSURE: 78 MMHG

## 2023-02-20 DIAGNOSIS — E78.5 HYPERLIPIDEMIA LDL GOAL <70: ICD-10-CM

## 2023-02-20 DIAGNOSIS — I25.10 CORONARY ARTERY DISEASE INVOLVING NATIVE CORONARY ARTERY OF NATIVE HEART WITHOUT ANGINA PECTORIS: Primary | ICD-10-CM

## 2023-02-20 DIAGNOSIS — Z72.0 TOBACCO ABUSE: ICD-10-CM

## 2023-02-20 DIAGNOSIS — E78.1 HYPERTRIGLYCERIDEMIA: ICD-10-CM

## 2023-02-20 DIAGNOSIS — E78.5 HYPERLIPIDEMIA LDL GOAL <70: Primary | ICD-10-CM

## 2023-02-20 DIAGNOSIS — Z82.49 FH: PREMATURE CORONARY HEART DISEASE: ICD-10-CM

## 2023-02-20 LAB
CHOLEST SERPL-MCNC: 125 MG/DL
HDLC SERPL-MCNC: 43 MG/DL
LDLC SERPL CALC-MCNC: 48 MG/DL
NONHDLC SERPL-MCNC: 82 MG/DL
TRIGL SERPL-MCNC: 171 MG/DL

## 2023-02-20 PROCEDURE — 99214 OFFICE O/P EST MOD 30 MIN: CPT | Performed by: INTERNAL MEDICINE

## 2023-02-20 PROCEDURE — 36415 COLL VENOUS BLD VENIPUNCTURE: CPT | Performed by: INTERNAL MEDICINE

## 2023-02-20 PROCEDURE — 80061 LIPID PANEL: CPT | Performed by: INTERNAL MEDICINE

## 2023-02-20 RX ORDER — ASCORBIC ACID 1000 MG
120 TABLET ORAL DAILY
COMMUNITY

## 2023-02-20 RX ORDER — METOPROLOL SUCCINATE 25 MG/1
12.5 TABLET, EXTENDED RELEASE ORAL DAILY
Qty: 45 TABLET | Refills: 0 | Status: SHIPPED | OUTPATIENT
Start: 2023-02-20 | End: 2023-06-21

## 2023-02-20 RX ORDER — LEVOCETIRIZINE DIHYDROCHLORIDE 5 MG/1
5 TABLET, FILM COATED ORAL DAILY
COMMUNITY

## 2023-02-20 RX ORDER — ROSUVASTATIN CALCIUM 5 MG/1
5 TABLET, COATED ORAL DAILY
Qty: 90 TABLET | Refills: 3 | Status: SHIPPED | OUTPATIENT
Start: 2023-02-20 | End: 2024-02-12

## 2023-02-20 RX ORDER — OMEGA-3/DHA/EPA/FISH OIL 60 MG-90MG
250 CAPSULE ORAL
COMMUNITY

## 2023-02-20 NOTE — RESULT ENCOUNTER NOTE
LDL at goal, triglycerides slightly elevated. Saw Dr Rodriguez this morning. Will discuss with him for any further recommendations.

## 2023-02-20 NOTE — LETTER
2/20/2023    Joel Santacruz MD  [address] [facility] [address]    RE: Andrei BOYER Jonah       Dear Colleague,     I had the pleasure of seeing Andrei ROSALIND Mckenzie in the [facility].  CARDIOLOGY VISIT    REASON FOR VISIT: CAD    SUBJECTIVE:  57-year-old male seen for follow-up of coronary artery disease.  He also has dyslipidemia, tobacco abuse, and mild ascending aortic enlargement.       2015 he had a calcium score of 721.  He had normal stress echo was in 2015 and 2017.       2019 he had inferior myocardial infarction with 100% RCA occlusion and 90% distal RCA lesion treated with 2 drug-eluting stents.  Left main was normal, LAD had up to 40% mid lesion, circumflex had minimal disease.  It was felt this was a delayed presentation of an MI, troponin was 11, then trended down.  Echo showed EF 50 to 55% with basal inferior hypokinesis, normal RV, no valve disease, aorta 3.9 cm.       Echo May 2019 showed EF 50 to 55%, basal inferior hypokinesis, normal RV, no valve disease, aorta 3.9 cm.      Stress echo January 2020 showed 13 minutes, no chest pain, 99% max heart rate, no EKG changes, frequent PVCs and triplets in recovery, basal inferior and inferolateral hypokinesis, no change with stress.      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    Nuclear stress May 2022 showed small infarct of the mid to basal inferior segment, no ischemia, EF 60%.    He has been feeling well recently.  He denies any chest pain or shortness of breath.  He has been asked to participate in a trial with a medication that lowers LP(a).    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    MEDICATIONS:  Current Outpatient Medications   Medication     aspirin 81 MG tablet     Cholecalciferol (VITAMIN D3) 5000 UNITS TBDP     coenzyme Q-10 100 MG CAPS     evolocumab (REPATHA) 140 MG/ML prefilled autoinjector     ketotifen (ZADITOR/REFRESH ANTI-ITCH) 0.025 % ophthalmic solution     magnesium  100 MG TABS     Menaquinone-7 100 MCG CAPS     metoprolol succinate ER (TOPROL XL) 25 MG 24 hr tablet     Multiple Vitamins-Minerals (MULTIVITAL PO)     nitroGLYcerin (NITROSTAT) 0.4 MG sublingual tablet     OVER-THE-COUNTER     rosuvastatin (CRESTOR) 20 MG tablet     Saw Palmetto, Serenoa repens, (SAW PALMETTO PO)     No current facility-administered medications for this visit.       ALLERGIES:  Allergies   Allergen Reactions     Fenofibrate      He has muscle aches.     Mildew      Mold      Penicillins Nausea and Vomiting     Statins [Hmg-Coa-R Inhibitors]      He did not tolerate.       REVIEW OF SYSTEMS:  Constitutional:  No weight loss, fever, chills  HEENT:  Eyes:  No visual loss, blurred vision, double vision or yellow sclerae. No hearing loss, sneezing, congestion, runny nose or sore throat.  Skin:  No rash or itching.  Cardiovascular: per HPI  Respiratory: per HPI  GI:  No anorexia, nausea, vomiting or diarrhea. No abdominal pain or blood.  :  No dysurea, hematuria  Neurologic:  No headache, paralysis, ataxia, numbness or tingling in the extremities. No change in bowel or bladder control.  Musculoskeletal:  No muscle pain  Hematologic:  No bleeding or bruising.  Lymphatics:  No enlarged nodes. No history of splenectomy.  Endocrine:  No reports of sweating, cold or heat intolerance. No polyuria or polydipsia.  Allergies:  No history of asthma, hives, eczema or rhinitis.    PHYSICAL EXAM:  /78 (BP Location: Right arm, Patient Position: Sitting, Cuff Size: Adult Regular)   Pulse 81   Resp 16   Wt 87.1 kg (192 lb)   SpO2 97%   BMI 27.55 kg/m      Constitutional: awake, alert, no distress  Eyes: PERRL, sclera nonicteric  ENT: trachea midline  Respiratory: Lungs clear  Cardiovascular: Regular rate and rhythm, no murmurs  GI: nondistended, nontender, bowel sounds present  Lymph/Hematologic: no lymphadenopathy  Skin: dry, no rash  Musculoskeletal: good muscle tone, strength 5/5 in upper and lower  extremities  Neurologic: no focal deficits  Neuropsychiatric: appropriate affact    DATA:  Lab:   Recent Labs   Lab Test 05/11/22  0656 04/12/22  0707 02/10/16  0701 09/25/15  0709 04/10/15  0624   CHOL 72 81   < > 164 142   HDL 36* 37*   < > 36* 36*   LDL 24  7 20  14   < > 101 83   TRIG 144 149   < > 133 117   CHOLHDLRATIO  --   --   --  4.6 3.9    < > = values in this interval not displayed.       ASSESSMENT:  57-year-old male seen for CAD.  He has no concerning cardiac symptoms.  We mostly talked about lipids today.  He had a dramatic LDL reduction with Repatha.  He is interested in cutting back rosuvastatin dose which seems reasonable.  As for the trial medication, he probably does not need additional medications since he responded so well to Repatha.  He will probably hold off on participating in the trial.    He also asks about progression of any coronary disease.  It was explained to him that he cannot have a calcium score with a stent.  Angiogram would not be done unless he truly had symptoms.    RECOMMENDATIONS:  1.  CAD  -Decrease rosuvastatin to 5 mg once daily, continue Repatha  -Continue other medications  -Treadmill stress echo on follow-up, continue all medications for the test    Follow-up in 1 year after stress test.    Mango Rodriguez MD  Cardiology - [facility]  Pager:  [phone]  Text Page  February 20, 2023    Thank you for allowing me to participate in the care of your patient.      Sincerely,     Mango Rodrigeuz MD     [facility]  cc:   Jojo Trinh, APRN CNP  [address]  [place] [address]

## 2023-02-20 NOTE — RESULT ENCOUNTER NOTE
"Per Dr Rodriguez: \"My plan was to decrease rosuvastatin to 5 mg once daily.  He can still do this and recheck lipids in 4 to 6 weeks. Demar\". Pt notified via [email or web address]. Lab orders placed. Rosuvastatin script had already been sent by Dr Rodriguez this morning.   "

## 2023-03-28 ENCOUNTER — LAB (OUTPATIENT)
Dept: LAB | Facility: CLINIC | Age: 58
End: 2023-03-28
Payer: COMMERCIAL

## 2023-03-28 ENCOUNTER — TELEPHONE (OUTPATIENT)
Dept: FAMILY MEDICINE | Facility: CLINIC | Age: 58
End: 2023-03-28

## 2023-03-28 DIAGNOSIS — E78.5 HYPERLIPIDEMIA LDL GOAL <70: ICD-10-CM

## 2023-03-28 LAB
ALT SERPL W P-5'-P-CCNC: 36 U/L (ref 10–50)
CHOLEST SERPL-MCNC: 93 MG/DL
HDLC SERPL-MCNC: 38 MG/DL
LDLC SERPL CALC-MCNC: 23 MG/DL
NONHDLC SERPL-MCNC: 55 MG/DL
TRIGL SERPL-MCNC: 159 MG/DL

## 2023-03-28 PROCEDURE — 80061 LIPID PANEL: CPT | Performed by: INTERNAL MEDICINE

## 2023-03-28 PROCEDURE — 84460 ALANINE AMINO (ALT) (SGPT): CPT | Performed by: INTERNAL MEDICINE

## 2023-03-28 PROCEDURE — 36415 COLL VENOUS BLD VENIPUNCTURE: CPT | Performed by: INTERNAL MEDICINE

## 2023-03-28 NOTE — TELEPHONE ENCOUNTER
Prior Authorization Retail Medication Request    Medication/Dose: Repatha sureclick 140mg/ml soaj  ICD code (if different than what is on RX):    Previously Tried and Failed:    Rationale:      Insurance Name:  [facility]  Insurance ID:  [id number]      Pharmacy Information (if different than what is on RX)  Name:    Phone:      Thank you,    Philomena Jenkins, [facility]  [place], [place]

## 2023-03-30 NOTE — TELEPHONE ENCOUNTER
Prior Authorization Approval    Authorization Effective Date: 2/28/2002  Authorization Expiration Date: 3/29/2024  Medication: Repatha sureclick 140mg/ml soaj  Approved Dose/Quantity:   Reference #:     Insurance Company: [employer] - Phone [phone] Fax [phone]  Expected CoPay:       CoPay Card Available:      Foundation Assistance Needed:    Which Pharmacy is filling the prescription (Not needed for infusion/clinic administered): [facility] [place], [place] - [address] [place] [address]  Pharmacy Notified: Yes  Patient Notified: Yes

## 2023-03-30 NOTE — TELEPHONE ENCOUNTER
Central Prior Authorization Team   Phone: [phone]    PA Initiation    Medication: Repatha sureclick 140mg/ml soaj  Insurance Company: [employer] - Phone [phone] Fax [phone]  Pharmacy Filling the Rx: [facility] [place], [place] - [address] [facility]  Filling Pharmacy Phone: [phone]  Filling Pharmacy Fax:    Start Date: 3/30/2023

## 2023-04-09 ENCOUNTER — HEALTH MAINTENANCE LETTER (OUTPATIENT)
Age: 58
End: 2023-04-09

## 2023-06-21 DIAGNOSIS — E78.1 HYPERTRIGLYCERIDEMIA: ICD-10-CM

## 2023-06-21 DIAGNOSIS — Z82.49 FH: PREMATURE CORONARY HEART DISEASE: ICD-10-CM

## 2023-06-21 RX ORDER — METOPROLOL SUCCINATE 25 MG/1
12.5 TABLET, EXTENDED RELEASE ORAL DAILY
Qty: 45 TABLET | Refills: 0 | Status: CANCELLED | OUTPATIENT
Start: 2023-06-21

## 2023-06-21 RX ORDER — METOPROLOL SUCCINATE 25 MG/1
12.5 TABLET, EXTENDED RELEASE ORAL DAILY
Qty: 45 TABLET | Refills: 1 | Status: SHIPPED | OUTPATIENT
Start: 2023-06-21 | End: 2023-12-28

## 2023-08-21 ENCOUNTER — MYC REFILL (OUTPATIENT)
Dept: CARDIOLOGY | Facility: CLINIC | Age: 58
End: 2023-08-21
Payer: COMMERCIAL

## 2023-08-21 DIAGNOSIS — Z82.49 FH: PREMATURE CORONARY HEART DISEASE: ICD-10-CM

## 2023-08-21 DIAGNOSIS — E78.5 HYPERLIPIDEMIA LDL GOAL <70: ICD-10-CM

## 2023-12-28 DIAGNOSIS — Z82.49 FH: PREMATURE CORONARY HEART DISEASE: ICD-10-CM

## 2023-12-28 DIAGNOSIS — E78.1 HYPERTRIGLYCERIDEMIA: ICD-10-CM

## 2023-12-28 RX ORDER — METOPROLOL SUCCINATE 25 MG/1
12.5 TABLET, EXTENDED RELEASE ORAL DAILY
Qty: 45 TABLET | Refills: 0 | Status: SHIPPED | OUTPATIENT
Start: 2023-12-28 | End: 2024-03-20

## 2023-12-28 NOTE — TELEPHONE ENCOUNTER
[facility] Cardiology Refill Guideline reviewed.  Medication meets criteria for refill.    Due for yearly follow up in Feb 2024. x1 90 day refill given.    No further refills until seen by cardiology--call [phone] to schedule    Johanna Chandra RN

## 2024-02-12 DIAGNOSIS — E78.5 HYPERLIPIDEMIA LDL GOAL <70: ICD-10-CM

## 2024-02-13 ENCOUNTER — LAB (OUTPATIENT)
Dept: LAB | Facility: CLINIC | Age: 59
End: 2024-02-13
Payer: COMMERCIAL

## 2024-02-13 DIAGNOSIS — Z82.49 FH: PREMATURE CORONARY HEART DISEASE: ICD-10-CM

## 2024-02-13 DIAGNOSIS — I25.10 CORONARY ARTERY DISEASE INVOLVING NATIVE CORONARY ARTERY OF NATIVE HEART WITHOUT ANGINA PECTORIS: ICD-10-CM

## 2024-02-13 DIAGNOSIS — E78.5 HYPERLIPIDEMIA LDL GOAL <70: ICD-10-CM

## 2024-02-13 LAB
ALT SERPL W P-5'-P-CCNC: 48 U/L (ref 0–70)
APO A-I SERPL-MCNC: 89 MG/DL
CHOLEST SERPL-MCNC: 84 MG/DL
FASTING STATUS PATIENT QL REPORTED: YES
HDLC SERPL-MCNC: 40 MG/DL
LDLC SERPL CALC-MCNC: 11 MG/DL
NONHDLC SERPL-MCNC: 44 MG/DL
TRIGL SERPL-MCNC: 167 MG/DL

## 2024-02-13 PROCEDURE — 83695 ASSAY OF LIPOPROTEIN(A): CPT | Performed by: INTERNAL MEDICINE

## 2024-02-13 PROCEDURE — 80061 LIPID PANEL: CPT | Performed by: INTERNAL MEDICINE

## 2024-02-13 PROCEDURE — 84460 ALANINE AMINO (ALT) (SGPT): CPT | Performed by: INTERNAL MEDICINE

## 2024-02-13 PROCEDURE — 36415 COLL VENOUS BLD VENIPUNCTURE: CPT | Performed by: INTERNAL MEDICINE

## 2024-02-13 RX ORDER — ROSUVASTATIN CALCIUM 5 MG/1
5 TABLET, COATED ORAL DAILY
Qty: 90 TABLET | Refills: 3 | Status: SHIPPED | OUTPATIENT
Start: 2024-02-13 | End: 2024-03-20

## 2024-03-07 ENCOUNTER — HOSPITAL ENCOUNTER (OUTPATIENT)
Dept: CARDIOLOGY | Facility: CLINIC | Age: 59
Discharge: HOME | End: 2024-03-07
Attending: INTERNAL MEDICINE | Admitting: INTERNAL MEDICINE
Payer: COMMERCIAL

## 2024-03-07 DIAGNOSIS — I25.10 CORONARY ARTERY DISEASE INVOLVING NATIVE CORONARY ARTERY OF NATIVE HEART WITHOUT ANGINA PECTORIS: ICD-10-CM

## 2024-03-07 PROCEDURE — 93321 DOPPLER ECHO F-UP/LMTD STD: CPT | Mod: 26 | Performed by: INTERNAL MEDICINE

## 2024-03-07 PROCEDURE — 93016 CV STRESS TEST SUPVJ ONLY: CPT | Performed by: INTERNAL MEDICINE

## 2024-03-07 PROCEDURE — 93018 CV STRESS TEST I&R ONLY: CPT | Performed by: INTERNAL MEDICINE

## 2024-03-07 PROCEDURE — 93325 DOPPLER ECHO COLOR FLOW MAPG: CPT | Mod: 26 | Performed by: INTERNAL MEDICINE

## 2024-03-07 PROCEDURE — 255N000002 HC RX 255 OP 636: Performed by: INTERNAL MEDICINE

## 2024-03-07 PROCEDURE — 999N000208 ECHO STRESS ECHOCARDIOGRAM

## 2024-03-07 PROCEDURE — 93350 STRESS TTE ONLY: CPT | Mod: 26 | Performed by: INTERNAL MEDICINE

## 2024-03-07 RX ADMIN — HUMAN ALBUMIN MICROSPHERES AND PERFLUTREN 2 ML: 10; .22 INJECTION, SOLUTION INTRAVENOUS at 09:36

## 2024-03-07 NOTE — RESULT ENCOUNTER NOTE
Normal stress echo with no evidence of stress induced ischemia; mild WMAs at baseline with no significant change post exercise. Follow up with Jojo Trinh NP on 3/20/24. Pt notified via [employer]

## 2024-03-20 ENCOUNTER — OFFICE VISIT (OUTPATIENT)
Dept: CARDIOLOGY | Facility: CLINIC | Age: 59
End: 2024-03-20
Payer: COMMERCIAL

## 2024-03-20 VITALS
BODY MASS INDEX: 27.83 KG/M2 | OXYGEN SATURATION: 98 % | DIASTOLIC BLOOD PRESSURE: 84 MMHG | HEART RATE: 88 BPM | SYSTOLIC BLOOD PRESSURE: 121 MMHG | HEIGHT: 70 IN | RESPIRATION RATE: 16 BRPM | WEIGHT: 194.4 LBS

## 2024-03-20 DIAGNOSIS — Z82.49 FH: PREMATURE CORONARY HEART DISEASE: ICD-10-CM

## 2024-03-20 DIAGNOSIS — I25.10 CORONARY ARTERY DISEASE INVOLVING NATIVE CORONARY ARTERY OF NATIVE HEART WITHOUT ANGINA PECTORIS: Primary | ICD-10-CM

## 2024-03-20 DIAGNOSIS — I77.810 DILATATION OF THORACIC AORTA (H): ICD-10-CM

## 2024-03-20 DIAGNOSIS — Z13.6 ENCOUNTER FOR SCREENING FOR STENOSIS OF CAROTID ARTERY: ICD-10-CM

## 2024-03-20 DIAGNOSIS — E78.1 HYPERTRIGLYCERIDEMIA: ICD-10-CM

## 2024-03-20 DIAGNOSIS — E78.5 HYPERLIPIDEMIA LDL GOAL <70: ICD-10-CM

## 2024-03-20 PROCEDURE — 99215 OFFICE O/P EST HI 40 MIN: CPT | Performed by: NURSE PRACTITIONER

## 2024-03-20 RX ORDER — NITROGLYCERIN 0.4 MG/1
TABLET SUBLINGUAL
Qty: 25 TABLET | Refills: 3 | Status: SHIPPED | OUTPATIENT
Start: 2024-03-20

## 2024-03-20 RX ORDER — METOPROLOL SUCCINATE 25 MG/1
12.5 TABLET, EXTENDED RELEASE ORAL DAILY
Qty: 45 TABLET | Refills: 3 | Status: SHIPPED | OUTPATIENT
Start: 2024-03-20

## 2024-03-20 RX ORDER — ROSUVASTATIN CALCIUM 5 MG/1
2.5 TABLET, COATED ORAL DAILY
Qty: 45 TABLET | Refills: 3 | Status: SHIPPED | OUTPATIENT
Start: 2024-03-20 | End: 2024-05-20

## 2024-03-20 NOTE — PROGRESS NOTES
Cardiology Clinic Progress Note  Andrei Mckenzie MRN# [id number]   YOB: 1965 Age: 58 year old      Primary Cardiologist:   Dr. Rodriguez  Patient presents today for annual follow-up        History of Presenting Illness:      Andrei Mckenzie is a pleasant 58 year old patient with a past cardiac history significant for   CAD  Inferior MI 2019 with JAE x2 to the RCA and residual 40% LAD   Stress echo 2020 negative  Hyperlipidemia  Ascending aortic dilatation  3.9 cm on the echo 2020   3.8 cm on stress echo 3/2024  Past medical history significant for tobacco abuse.      Patient was seen by Dr. Rodriguez in February 2023 for routine follow-up.  LDL had significantly improved after starting Repatha so rosuvastatin was decreased to 5 mg daily.  Stress echo recommended for FAA clearance.     Most recent lipid profile, BMP, ALT reviewed today. Stress echo March 2024 showed no evidence of stress-induced ischemia, WMA consistent with prior infarction, normal EF and no significant valvular disease.  Results reviewed today.    He denies any anginal symptoms but does report some musculoskeletal discomfort at times.  Blood pressures are well-controlled.  Given his LDL is low at 11 he is agreeable to further decreasing rosuvastatin.  However, he is worried that he will not be able to split these in half very well in that case would prefer to continue on the 5 mg daily.  He again has many questions regarding lipids.  Dr. Cruz saw him in 2022 and recommended 1 year follow-up which was never completed.  He is agreeable to setting this up.  He is worried about carotid artery disease and is agreeable to screening ultrasound.  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  On exam he had irregular heartbeat likely related to PVCs.  This correlates with PVCs noted on stress testing.  Patient reports no chest pain, shortness of breath, PND, orthopnea, presyncope, syncope, edema, heart racing, or palpitations.                   Assessment  and Plan:       Plan  Patient Instructions   Medication Changes:  Decrease rosuvastatin to 2.5 mg daily      Recommendations:  Call with questions    Follow-up:  Cardiology follow up at [facility]: Dr. Rodriguez in 1 year with fasting labs prior  Follow-up with Dr. Cruz for lipid follow-up at the [place]   Fasting lab in 2 months (lipid/ALT)   Carotid ultrasound     Cardiology Scheduling~[phone]  Cardiology Clinic RN~[phone] (Karla RN, Johanna RN; Elysia RN)            Coronary artery disease involving native coronary artery of native heart without angina pectoris  No angina  Continue GDMT  Stress echoes for surveillance    Hyperlipidemia LDL goal <55  LDL at goal  Continue Repatha, statin    Dilatation of thoracic aorta (H24)  Stable  BP controlled  Continue beta-blocker  Follow with echo             Respiratory:  clear to auscultation; normal symmetry        Cardiac: regular rate and rhythm irregular   GI:  nondistended     Extremities and Muscular Skeletal:   no edema            Thank you for allowing me to participate in this delightful patient's care.   This note was completed in part using Dragon voice recognition software. Although reviewed after completion, some word and grammatical errors may occur.    RENATO Roberts CNP    Total time spent today was 50 mins, reviewing labs, testing, notes, documenting notes, and seeing patient.

## 2024-03-20 NOTE — PATIENT INSTRUCTIONS
Medication Changes:  Decrease rosuvastatin to 2.5 mg daily      Recommendations:  Call with questions    Follow-up:  Cardiology follow up at [facility]: Dr. Rodriguez in 1 year with fasting labs prior  Follow-up with Dr. Cruz for lipid follow-up at the [place]   Fasting lab in 2 months (lipid/ALT)   Carotid ultrasound     Cardiology Scheduling~[phone]  Cardiology Clinic RN~[phone] (Karla RN, Johanna RN; Elysia RN)

## 2024-03-20 NOTE — LETTER
3/20/2024    Physician No Ref-Primary  No address on file    RE: Andrei Mckenzie       Dear Colleague,     I had the pleasure of seeing Andrei Mckenzie in the [facility].  Cardiology Clinic Progress Note  Andrei Mckenzie MRN# [id number]   YOB: 1965 Age: 58 year old      Primary Cardiologist:   Dr. Rodriguez  Patient presents today for annual follow-up        History of Presenting Illness:      Andrei Mckenzie is a pleasant 58 year old patient with a past cardiac history significant for   CAD  Inferior MI 2019 with JAE x2 to the RCA and residual 40% LAD   Stress echo 2020 negative  Hyperlipidemia  Ascending aortic dilatation  3.9 cm on the echo 2020   3.8 cm on stress echo 3/2024  Past medical history significant for tobacco abuse.      Patient was seen by Dr. Rodriguez in February 2023 for routine follow-up.  LDL had significantly improved after starting Repatha so rosuvastatin was decreased to 5 mg daily.  Stress echo recommended for FAA clearance.     Most recent lipid profile, BMP, ALT reviewed today. Stress echo March 2024 showed no evidence of stress-induced ischemia, WMA consistent with prior infarction, normal EF and no significant valvular disease.  Results reviewed today.    He denies any anginal symptoms but does report some musculoskeletal discomfort at times.  Blood pressures are well-controlled.  Given his LDL is low at 11 he is agreeable to further decreasing rosuvastatin.  However, he is worried that he will not be able to split these in half very well in that case would prefer to continue on the 5 mg daily.  He again has many questions regarding lipids.  Dr. Cruz saw him in 2022 and recommended 1 year follow-up which was never completed.  He is agreeable to setting this up.  He is worried about carotid artery disease and is agreeable to screening ultrasound.  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  On exam he had irregular heartbeat likely related to PVCs.  This correlates with  PVCs noted on stress testing.  Patient reports no chest pain, shortness of breath, PND, orthopnea, presyncope, syncope, edema, heart racing, or palpitations.                   Assessment and Plan:       Plan  Patient Instructions   Medication Changes:  Decrease rosuvastatin to 2.5 mg daily      Recommendations:  Call with questions    Follow-up:  Cardiology follow up at [place]: Dr. Rodriguez in 1 year with fasting labs prior  Follow-up with Dr. Cruz for lipid follow-up at the [place]   Fasting lab in 2 months (lipid/ALT)   Carotid ultrasound     Cardiology Scheduling~[phone]  Cardiology Clinic RN~[phone] (Karla RN, Johanna RN; Elysia RN)            Coronary artery disease involving native coronary artery of native heart without angina pectoris  No angina  Continue GDMT  Stress echoes for surveillance    Hyperlipidemia LDL goal <55  LDL at goal  Continue Repatha, statin    Dilatation of thoracic aorta (H24)  Stable  BP controlled  Continue beta-blocker  Follow with echo             Respiratory:  clear to auscultation; normal symmetry        Cardiac: regular rate and rhythm irregular   GI:  nondistended     Extremities and Muscular Skeletal:   no edema            Thank you for allowing me to participate in this delightful patient's care.   This note was completed in part using Dragon voice recognition software. Although reviewed after completion, some word and grammatical errors may occur.    RENATO Roberts CNP    Total time spent today was 50 mins, reviewing labs, testing, notes, documenting notes, and seeing patient.      Thank you for allowing me to participate in the care of your patient.      Sincerely,     RENATO Roberts CNP     [facility]  cc:   Mango Rodriguez MD

## 2024-03-21 DIAGNOSIS — Z82.49 FH: PREMATURE CORONARY HEART DISEASE: ICD-10-CM

## 2024-03-21 DIAGNOSIS — E78.5 HYPERLIPIDEMIA LDL GOAL <70: ICD-10-CM

## 2024-03-21 NOTE — TELEPHONE ENCOUNTER
Needing a new prescription sent over to [facility] pharmacy, not sure why the prescrition was cancelled out but patient is in need of the medication.     Thanks   Annette Schwab   [facility] [place]  [phone]

## 2024-03-27 ENCOUNTER — HOSPITAL ENCOUNTER (OUTPATIENT)
Dept: CARDIOLOGY | Facility: CLINIC | Age: 59
Discharge: HOME | End: 2024-03-27
Attending: NURSE PRACTITIONER | Admitting: NURSE PRACTITIONER
Payer: COMMERCIAL

## 2024-03-27 DIAGNOSIS — Z13.6 ENCOUNTER FOR SCREENING FOR STENOSIS OF CAROTID ARTERY: ICD-10-CM

## 2024-03-27 DIAGNOSIS — E78.5 HYPERLIPIDEMIA LDL GOAL <70: ICD-10-CM

## 2024-03-27 DIAGNOSIS — I25.10 CORONARY ARTERY DISEASE INVOLVING NATIVE CORONARY ARTERY OF NATIVE HEART WITHOUT ANGINA PECTORIS: ICD-10-CM

## 2024-03-27 PROCEDURE — 93880 EXTRACRANIAL BILAT STUDY: CPT

## 2024-03-27 PROCEDURE — 93880 EXTRACRANIAL BILAT STUDY: CPT | Mod: 26 | Performed by: INTERNAL MEDICINE

## 2024-04-16 ENCOUNTER — TELEPHONE (OUTPATIENT)
Dept: EMERGENCY MEDICINE | Facility: CLINIC | Age: 59
End: 2024-04-16
Payer: COMMERCIAL

## 2024-04-16 NOTE — TELEPHONE ENCOUNTER
Prior Authorization Retail Medication Request    Medication/Dose: Repatha Sureclick 140mg  Diagnosis and ICD code (if different than what is on RX):    New/renewal/insurance change PA/secondary ins. PA:  Previously Tried and Failed:    Rationale:      Insurance: [facility]  Primary: Yes  Insurance ID:  [id number]    Secondary (if applicable):  Insurance ID:      Pharmacy Information (if different than what is on RX)  Name:  [facility] [place]  Phone:  [phone]  Fax:     Thank You,  Belen Mckeon, C.Ph.T  [facility] Float Department

## 2024-04-30 NOTE — TELEPHONE ENCOUNTER
Central Prior Authorization Team   Phone: [phone]    PA Initiation    Medication: Repatha Sureclick 140mg  Insurance Company: [employer] - Phone [phone] Fax [phone]  Pharmacy Filling the Rx: [facility] [place], [place] - [address]  Filling Pharmacy Phone: [phone]  Filling Pharmacy Fax:    Start Date: 4/30/2024    Filled out manual form, faxed back with clinic notes and labs to [employer]

## 2024-05-01 NOTE — TELEPHONE ENCOUNTER
Prior Authorization Approval    Authorization Effective Date: 4/30/2024  Authorization Expiration Date: 4/30/2025  Medication: Repatha Sureclick 140mg  Approved Dose/Quantity:   Reference #:     Insurance Company: [employer] - Phone [phone] Fax [phone]  Expected CoPay:       CoPay Card Available:      Foundation Assistance Needed:    Which Pharmacy is filling the prescription (Not needed for infusion/clinic administered): [facility] [address]  Pharmacy Notified:  yes  Patient Notified:  yes- Pharmacy will contact patient when ready to /ship

## 2024-05-08 ENCOUNTER — MYC MEDICAL ADVICE (OUTPATIENT)
Dept: CARDIOLOGY | Facility: CLINIC | Age: 59
End: 2024-05-08
Payer: COMMERCIAL

## 2024-05-08 NOTE — CONFIDENTIAL NOTE
Patient already had lipoprotein a in February 2024.  No additional labs are needed other than the routine lipid, to reassess his LDL after decreasing rosuvastatin.  [facility] wants other lipid labs drawn.    RENATO Roberts CNP

## 2024-05-19 ENCOUNTER — TELEPHONE (OUTPATIENT)
Dept: CARDIOLOGY | Facility: CLINIC | Age: 59
End: 2024-05-19
Payer: COMMERCIAL

## 2024-05-19 NOTE — TELEPHONE ENCOUNTER
Patient states he was supposed to decrease to 2.5 mg of crestor, but the tabs are too small to split and has continuing the 5mg tabs.  If appropriate can we get a new Rx at 5 mg daily?    He states he spoke with someone regarding this but I was unable to find that documentation    Thanks,    Víctor Donis, Pharm D  [facility]  [phone]

## 2024-05-20 NOTE — TELEPHONE ENCOUNTER
Routing call to Dr. Montero-     Last LDL was only 11.     Okay to just stop Crestor and remain on Repatha only?    Johanna Chandra RN

## 2024-06-16 ENCOUNTER — HEALTH MAINTENANCE LETTER (OUTPATIENT)
Age: 59
End: 2024-06-16

## 2024-07-18 ENCOUNTER — LAB (OUTPATIENT)
Dept: LAB | Facility: CLINIC | Age: 59
End: 2024-07-18
Payer: COMMERCIAL

## 2024-07-18 DIAGNOSIS — E78.5 HYPERLIPIDEMIA LDL GOAL <70: ICD-10-CM

## 2024-07-18 LAB
ALT SERPL W P-5'-P-CCNC: 34 U/L (ref 0–70)
CHOLEST SERPL-MCNC: 190 MG/DL
FASTING STATUS PATIENT QL REPORTED: YES
HDLC SERPL-MCNC: 30 MG/DL
LDLC SERPL CALC-MCNC: ABNORMAL MG/DL
LDLC SERPL DIRECT ASSAY-MCNC: 90 MG/DL
NONHDLC SERPL-MCNC: 160 MG/DL
TRIGL SERPL-MCNC: 414 MG/DL

## 2024-07-18 PROCEDURE — 83721 ASSAY OF BLOOD LIPOPROTEIN: CPT | Mod: XU | Performed by: NURSE PRACTITIONER

## 2024-07-18 PROCEDURE — 36415 COLL VENOUS BLD VENIPUNCTURE: CPT | Performed by: NURSE PRACTITIONER

## 2024-07-18 PROCEDURE — 84460 ALANINE AMINO (ALT) (SGPT): CPT | Performed by: NURSE PRACTITIONER

## 2024-07-18 PROCEDURE — 80061 LIPID PANEL: CPT | Performed by: NURSE PRACTITIONER

## 2024-07-19 ENCOUNTER — MYC MEDICAL ADVICE (OUTPATIENT)
Dept: CARDIOLOGY | Facility: CLINIC | Age: 59
End: 2024-07-19
Payer: COMMERCIAL

## 2024-07-19 DIAGNOSIS — I25.10 CORONARY ARTERY DISEASE INVOLVING NATIVE CORONARY ARTERY OF NATIVE HEART WITHOUT ANGINA PECTORIS: ICD-10-CM

## 2024-07-19 DIAGNOSIS — E78.5 HYPERLIPIDEMIA LDL GOAL <70: Primary | ICD-10-CM

## 2024-07-19 RX ORDER — ROSUVASTATIN CALCIUM 5 MG/1
5 TABLET, COATED ORAL DAILY
Qty: 90 TABLET | Refills: 3 | Status: SHIPPED | OUTPATIENT
Start: 2024-07-19

## 2024-07-19 NOTE — RESULT ENCOUNTER NOTE
Lipids worsened--LDL direct above goal; triglycerides marked elevated from previous; ALT WNL. Done after decreasing rosuvastatin to 2.5 mg daily on 3/20/24. Was to have followed up with Dr Cruz at the  for lipid management but has not yet scheduled. No longer taking fish oil per med list. Will discuss with Jojo Trinh NP for recommendations.

## 2024-07-19 NOTE — CONFIDENTIAL NOTE
Yeah, I'm ok with him just restarting the 5 mg dose and having chol numbers a little lower since he doesn't want to cut them.     RENATO Roberts CNP

## 2024-10-28 ENCOUNTER — MYC MEDICAL ADVICE (OUTPATIENT)
Dept: CARDIOLOGY | Facility: CLINIC | Age: 59
End: 2024-10-28
Payer: COMMERCIAL

## 2024-10-28 DIAGNOSIS — E78.1 HYPERTRIGLYCERIDEMIA: ICD-10-CM

## 2024-10-28 DIAGNOSIS — Z82.49 FH: PREMATURE CORONARY HEART DISEASE: ICD-10-CM

## 2024-10-28 RX ORDER — METOPROLOL SUCCINATE 25 MG/1
12.5 TABLET, EXTENDED RELEASE ORAL DAILY
Qty: 45 TABLET | Refills: 0 | Status: SHIPPED | OUTPATIENT
Start: 2024-10-28

## 2025-02-17 ENCOUNTER — MYC MEDICAL ADVICE (OUTPATIENT)
Dept: CARDIOLOGY | Facility: CLINIC | Age: 60
End: 2025-02-17
Payer: COMMERCIAL

## 2025-02-17 DIAGNOSIS — E78.5 HYPERLIPIDEMIA LDL GOAL <70: ICD-10-CM

## 2025-02-17 DIAGNOSIS — Z82.49 FH: PREMATURE CORONARY HEART DISEASE: ICD-10-CM

## 2025-02-17 NOTE — TELEPHONE ENCOUNTER
[facility] Cardiology Refill Guideline reviewed.  Medication meets criteria for refill. Karla Rick RN Cardiology February 17, 2025, 7:34 AM

## 2025-04-19 ENCOUNTER — MYC REFILL (OUTPATIENT)
Dept: CARDIOLOGY | Facility: CLINIC | Age: 60
End: 2025-04-19
Payer: COMMERCIAL

## 2025-04-19 DIAGNOSIS — E78.1 HYPERTRIGLYCERIDEMIA: ICD-10-CM

## 2025-04-19 DIAGNOSIS — Z82.49 FH: PREMATURE CORONARY HEART DISEASE: ICD-10-CM

## 2025-04-21 ENCOUNTER — LAB (OUTPATIENT)
Dept: LAB | Facility: CLINIC | Age: 60
End: 2025-04-21
Payer: COMMERCIAL

## 2025-04-21 DIAGNOSIS — E78.5 HYPERLIPIDEMIA LDL GOAL <70: ICD-10-CM

## 2025-04-21 LAB
ALT SERPL W P-5'-P-CCNC: 41 U/L (ref 0–70)
CHOLEST SERPL-MCNC: 105 MG/DL
FASTING STATUS PATIENT QL REPORTED: YES
HDLC SERPL-MCNC: 37 MG/DL
LDLC SERPL CALC-MCNC: 24 MG/DL
NONHDLC SERPL-MCNC: 68 MG/DL
TRIGL SERPL-MCNC: 218 MG/DL

## 2025-04-21 RX ORDER — METOPROLOL SUCCINATE 25 MG/1
12.5 TABLET, EXTENDED RELEASE ORAL DAILY
Qty: 45 TABLET | Refills: 0 | Status: SHIPPED | OUTPATIENT
Start: 2025-04-21

## 2025-04-21 NOTE — TELEPHONE ENCOUNTER
[facility] Cardiology Refill Guideline reviewed.  Medication meets criteria for refill. Scheduled on 5/13/25 with Dr Rodriguez. 90 day fill given. Karla Rick RN Cardiology April 21, 2025, 7:36 AM

## 2025-05-12 ENCOUNTER — TELEPHONE (OUTPATIENT)
Dept: CARDIOLOGY | Facility: CLINIC | Age: 60
End: 2025-05-12
Payer: COMMERCIAL

## 2025-05-12 NOTE — PROGRESS NOTES
CARDIOLOGY VISIT    REASON FOR VISIT: CAD    SUBJECTIVE:  59-year-old male seen for follow-up of coronary artery disease.  He also has dyslipidemia, tobacco abuse, and mild ascending aortic enlargement.       2015 he had a calcium score of 721.  He had normal stress echo was in 2015 and 2017.       2019 he had inferior myocardial infarction with 100% RCA occlusion and 90% distal RCA lesion treated with 2 drug-eluting stents.  Left main was normal, LAD had up to 40% mid lesion, circumflex had minimal disease.  Echo showed EF 50 to 55% with basal inferior hypokinesis, normal RV, no valve disease, aorta 3.9 cm.       Echo 2019 showed EF 50 to 55%, basal inferior hypokinesis, normal RV, no valve disease, aorta 3.9 cm.      Stress echo 2020 showed 13 minutes, no chest pain, 99% max heart rate, no EKG changes, frequent PVCs and triplets in recovery, basal inferior and inferolateral hypokinesis, no change with stress.      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     Nuclear stress May 2022 showed small infarct of the mid to basal inferior segment, no ischemia, EF 60%.    Stress echo 2024 was normal, there is baseline mild basal inferior hypokinesis.  Carotid ultrasound 2024 showed less than 50% stenosis bilaterally.     He has been feeling okay recently.  He does not monitor activity and has no shortness of breath or chest pain.  Home blood pressure runs 120/70 or less.  He is very concerned about his cholesterol and has questions in lepodisiran if it becomes approved.    MEDICATIONS:  Current Outpatient Medications   Medication Sig Dispense Refill    aspirin 81 MG tablet Take 1 tablet (81 mg) by mouth daily      B Complex-C (VITAMIN B COMPLEX W/VITAMIN C) TABS tablet Take 1 tablet by mouth daily      Cholecalciferol (VITAMIN D3) 5000 UNITS TBDP Take 10,000 Units by mouth daily       coenzyme Q-10 100 MG CAPS Take 300 mg by mouth 2 times daily 120 capsule 0    evolocumab (REPATHA) 140 MG/ML prefilled autoinjector  Inject 1 mL (140 mg) subcutaneously every 14 days. 6 mL 1    fish oil-omega-3 fatty acids 500 MG capsule Take 250 mg by mouth      Ginkgo Biloba 40 MG TABS Take 120 mg by mouth daily      KETOTIFEN FUMARATE OP 1 drop 2 times daily as needed for itching      levocetirizine (XYZAL) 5 MG tablet Take 5 mg by mouth daily      magnesium 100 MG TABS Take 1,000 mg by mouth 2 times daily      Menaquinone-7 100 MCG CAPS Take 600 mcg by mouth 2 times daily 30 capsule 0    metoprolol succinate ER (TOPROL XL) 25 MG 24 hr tablet Take 0.5 tablets (12.5 mg) by mouth daily. 45 tablet 0    Multiple Vitamins-Minerals (MULTIVITAL PO) Take 1 tablet by mouth daily      nitroGLYcerin (NITROSTAT) 0.4 MG sublingual tablet For chest pain place 1 tab under the tongue every 5 minutes for 3 doses. If symptoms persists call 911.  Hold on file until needed 25 tablet 3    rosuvastatin (CRESTOR) 5 MG tablet Take 1 tablet (5 mg) by mouth daily 90 tablet 3    TURMERIC PO Take 3,800 mg by mouth 2 times daily       No current facility-administered medications for this visit.       ALLERGIES:  Allergies   Allergen Reactions    Fenofibrate      He has muscle aches.    Mildew     Mold     Penicillins Nausea and Vomiting    Statins [Statins]      He did not tolerate.       REVIEW OF SYSTEMS:  Constitutional:  No weight loss, fever, chills  HEENT:  Eyes:  No visual loss, blurred vision, double vision or yellow sclerae. No hearing loss, sneezing, congestion, runny nose or sore throat.  Skin:  No rash or itching.  Cardiovascular: per HPI  Respiratory: per HPI  GI:  No anorexia, nausea, vomiting or diarrhea. No abdominal pain or blood.  :  No dysurea, hematuria  Neurologic:  No headache, paralysis, ataxia, numbness or tingling in the extremities. No change in bowel or bladder control.  Musculoskeletal:  No muscle pain  Hematologic:  No bleeding or bruising.  Lymphatics:  No enlarged nodes. No history of splenectomy.  Endocrine:  No reports of sweating, cold  "or heat intolerance. No polyuria or polydipsia.  Allergies:  No history of asthma, hives, eczema or rhinitis.    PHYSICAL EXAM:  BP (!) 137/92 (BP Location: Right arm, Patient Position: Sitting, Cuff Size: Adult Large)   Pulse (!) 47   Resp 16   Ht 1.778 m (5' 10\")   Wt 90.7 kg (199 lb 14.4 oz)   SpO2 98%   BMI 28.68 kg/m    Constitutional: awake, alert, no distress  Eyes: PERRL, sclera nonicteric  ENT: trachea midline  Respiratory: Lungs clear  Cardiovascular: Regular rate and rhythm, no murmurs  GI: nondistended, nontender, bowel sounds present  Lymph/Hematologic: no lymphadenopathy  Skin: dry, no rash  Musculoskeletal: good muscle tone, strength 5/5 in upper and lower extremities  Neurologic: no focal deficits  Neuropsychiatric: appropriate affact    DATA:  Lab:   Recent Labs   Lab Test 04/21/25  0704 07/18/24  0657   CHOL 105 190   HDL 37* 30*   LDL 24 90   TRIG 218* 414*     ASSESSMENT:  59-year-old male seen for CAD.  He has no concerning cardiac symptoms.  Blood pressure is well-controlled.  His lipids are excellent on Repatha and low-dose rosuvastatin.  On follow-up next year could discuss any new medications available, especially for lipoprotein a.  It was explained that Repatha typically lowers lipoprotein a by about 25%.    RECOMMENDATIONS:  1.  CAD  - Continue current medications  - Consider stress testing every 3 to 5 years    2.  Mild carotid plaque  - Repeat carotid ultrasound in 2 or 3 years    Follow-up in 1 year with DEVON, check lipids and lipoprotein a. Discuss lepodisiran or other therapies if approved for lipoprotein a lowering.    Mango Rodriguez MD  Cardiology - [facility]  Pager:  [phone]  Text Page  May 13, 2025      "

## 2025-05-12 NOTE — TELEPHONE ENCOUNTER
Thank you so much for your help!  Cecilia Holcomb [facility] Float Department

## 2025-05-13 ENCOUNTER — OFFICE VISIT (OUTPATIENT)
Dept: CARDIOLOGY | Facility: CLINIC | Age: 60
End: 2025-05-13
Payer: COMMERCIAL

## 2025-05-13 VITALS
WEIGHT: 199.9 LBS | SYSTOLIC BLOOD PRESSURE: 137 MMHG | HEART RATE: 47 BPM | OXYGEN SATURATION: 98 % | BODY MASS INDEX: 28.62 KG/M2 | RESPIRATION RATE: 16 BRPM | HEIGHT: 70 IN | DIASTOLIC BLOOD PRESSURE: 92 MMHG

## 2025-05-13 DIAGNOSIS — E78.5 HYPERLIPIDEMIA LDL GOAL <70: ICD-10-CM

## 2025-05-13 DIAGNOSIS — I77.810 DILATATION OF THORACIC AORTA: ICD-10-CM

## 2025-05-13 DIAGNOSIS — I25.10 CORONARY ARTERY DISEASE INVOLVING NATIVE CORONARY ARTERY OF NATIVE HEART WITHOUT ANGINA PECTORIS: ICD-10-CM

## 2025-05-13 PROCEDURE — 99214 OFFICE O/P EST MOD 30 MIN: CPT | Performed by: INTERNAL MEDICINE

## 2025-05-13 PROCEDURE — 3080F DIAST BP >= 90 MM HG: CPT | Performed by: INTERNAL MEDICINE

## 2025-05-13 PROCEDURE — 3075F SYST BP GE 130 - 139MM HG: CPT | Performed by: INTERNAL MEDICINE

## 2025-05-13 RX ORDER — LORATADINE 10 MG/1
10 TABLET ORAL DAILY
COMMUNITY

## 2025-05-13 NOTE — TELEPHONE ENCOUNTER
Approved through [facility]    Pharmacy was called and they now have a paid claim

## 2025-05-13 NOTE — LETTER
5/13/2025    Physician No Ref-Primary  No address on file    RE: nAdrei BOYER Jonah       Dear Colleague,     I had the pleasure of seeing Andrei Mckenzie in the [facility].  CARDIOLOGY VISIT    REASON FOR VISIT: CAD    SUBJECTIVE:  59-year-old male seen for follow-up of coronary artery disease.  He also has dyslipidemia, tobacco abuse, and mild ascending aortic enlargement.       2015 he had a calcium score of 721.  He had normal stress echo was in 2015 and 2017.       2019 he had inferior myocardial infarction with 100% RCA occlusion and 90% distal RCA lesion treated with 2 drug-eluting stents.  Left main was normal, LAD had up to 40% mid lesion, circumflex had minimal disease.  Echo showed EF 50 to 55% with basal inferior hypokinesis, normal RV, no valve disease, aorta 3.9 cm.       Echo 2019 showed EF 50 to 55%, basal inferior hypokinesis, normal RV, no valve disease, aorta 3.9 cm.      Stress echo 2020 showed 13 minutes, no chest pain, 99% max heart rate, no EKG changes, frequent PVCs and triplets in recovery, basal inferior and inferolateral hypokinesis, no change with stress.      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     Nuclear stress May 2022 showed small infarct of the mid to basal inferior segment, no ischemia, EF 60%.    Stress echo 2024 was normal, there is baseline mild basal inferior hypokinesis.  Carotid ultrasound 2024 showed less than 50% stenosis bilaterally.     He has been feeling okay recently.  He does not monitor activity and has no shortness of breath or chest pain.  Home blood pressure runs 120/70 or less.  He is very concerned about his cholesterol and has questions in lepodisiran if it becomes approved.    MEDICATIONS:  Current Outpatient Medications   Medication Sig Dispense Refill     aspirin 81 MG tablet Take 1 tablet (81 mg) by mouth daily       B Complex-C (VITAMIN B COMPLEX W/VITAMIN C) TABS tablet Take 1 tablet by mouth daily       Cholecalciferol (VITAMIN  D3) 5000 UNITS TBDP Take 10,000 Units by mouth daily        coenzyme Q-10 100 MG CAPS Take 300 mg by mouth 2 times daily 120 capsule 0     evolocumab (REPATHA) 140 MG/ML prefilled autoinjector Inject 1 mL (140 mg) subcutaneously every 14 days. 6 mL 1     fish oil-omega-3 fatty acids 500 MG capsule Take 250 mg by mouth       Ginkgo Biloba 40 MG TABS Take 120 mg by mouth daily       KETOTIFEN FUMARATE OP 1 drop 2 times daily as needed for itching       levocetirizine (XYZAL) 5 MG tablet Take 5 mg by mouth daily       magnesium 100 MG TABS Take 1,000 mg by mouth 2 times daily       Menaquinone-7 100 MCG CAPS Take 600 mcg by mouth 2 times daily 30 capsule 0     metoprolol succinate ER (TOPROL XL) 25 MG 24 hr tablet Take 0.5 tablets (12.5 mg) by mouth daily. 45 tablet 0     Multiple Vitamins-Minerals (MULTIVITAL PO) Take 1 tablet by mouth daily       nitroGLYcerin (NITROSTAT) 0.4 MG sublingual tablet For chest pain place 1 tab under the tongue every 5 minutes for 3 doses. If symptoms persists call 911.  Hold on file until needed 25 tablet 3     rosuvastatin (CRESTOR) 5 MG tablet Take 1 tablet (5 mg) by mouth daily 90 tablet 3     TURMERIC PO Take 3,800 mg by mouth 2 times daily       No current facility-administered medications for this visit.       ALLERGIES:  Allergies   Allergen Reactions     Fenofibrate      He has muscle aches.     Mildew      Mold      Penicillins Nausea and Vomiting     Statins [Statins]      He did not tolerate.       REVIEW OF SYSTEMS:  Constitutional:  No weight loss, fever, chills  HEENT:  Eyes:  No visual loss, blurred vision, double vision or yellow sclerae. No hearing loss, sneezing, congestion, runny nose or sore throat.  Skin:  No rash or itching.  Cardiovascular: per HPI  Respiratory: per HPI  GI:  No anorexia, nausea, vomiting or diarrhea. No abdominal pain or blood.  :  No dysurea, hematuria  Neurologic:  No headache, paralysis, ataxia, numbness or tingling in the extremities. No  "change in bowel or bladder control.  Musculoskeletal:  No muscle pain  Hematologic:  No bleeding or bruising.  Lymphatics:  No enlarged nodes. No history of splenectomy.  Endocrine:  No reports of sweating, cold or heat intolerance. No polyuria or polydipsia.  Allergies:  No history of asthma, hives, eczema or rhinitis.    PHYSICAL EXAM:  BP (!) 137/92 (BP Location: Right arm, Patient Position: Sitting, Cuff Size: Adult Large)   Pulse (!) 47   Resp 16   Ht 1.778 m (5' 10\")   Wt 90.7 kg (199 lb 14.4 oz)   SpO2 98%   BMI 28.68 kg/m    Constitutional: awake, alert, no distress  Eyes: PERRL, sclera nonicteric  ENT: trachea midline  Respiratory: Lungs clear  Cardiovascular: Regular rate and rhythm, no murmurs  GI: nondistended, nontender, bowel sounds present  Lymph/Hematologic: no lymphadenopathy  Skin: dry, no rash  Musculoskeletal: good muscle tone, strength 5/5 in upper and lower extremities  Neurologic: no focal deficits  Neuropsychiatric: appropriate affact    DATA:  Lab:   Recent Labs   Lab Test 04/21/25  0704 07/18/24  0657   CHOL 105 190   HDL 37* 30*   LDL 24 90   TRIG 218* 414*     ASSESSMENT:  59-year-old male seen for CAD.  He has no concerning cardiac symptoms.  Blood pressure is well-controlled.  His lipids are excellent on Repatha and low-dose rosuvastatin.  On follow-up next year could discuss any new medications available, especially for lipoprotein a.  It was explained that Repatha typically lowers lipoprotein a by about 25%.    RECOMMENDATIONS:  1.  CAD  - Continue current medications  - Consider stress testing every 3 to 5 years    2.  Mild carotid plaque  - Repeat carotid ultrasound in 2 or 3 years    Follow-up in 1 year with DEVON, check lipids and lipoprotein a. Discuss lepodisiran or other therapies if approved for lipoprotein a lowering.    Mango Rodriguez MD  Cardiology - [facility]  Pager:  [phone]  Text Page  May 13, 2025        Thank you for allowing me to participate in the care " of your patient.      Sincerely,     Mango Rodriguez MD     [facility]  cc:   RENATO Cornejo CNP  [address] [place] [address]

## 2025-06-21 ENCOUNTER — HEALTH MAINTENANCE LETTER (OUTPATIENT)
Age: 60
End: 2025-06-21

## 2025-07-08 DIAGNOSIS — I25.10 CORONARY ARTERY DISEASE INVOLVING NATIVE CORONARY ARTERY OF NATIVE HEART WITHOUT ANGINA PECTORIS: ICD-10-CM

## 2025-07-08 DIAGNOSIS — E78.1 HYPERTRIGLYCERIDEMIA: ICD-10-CM

## 2025-07-08 DIAGNOSIS — E78.5 HYPERLIPIDEMIA LDL GOAL <70: ICD-10-CM

## 2025-07-08 DIAGNOSIS — Z82.49 FH: PREMATURE CORONARY HEART DISEASE: ICD-10-CM

## 2025-07-08 RX ORDER — ROSUVASTATIN CALCIUM 5 MG/1
5 TABLET, COATED ORAL DAILY
Qty: 90 TABLET | Refills: 3 | Status: SHIPPED | OUTPATIENT
Start: 2025-07-08

## 2025-07-08 RX ORDER — METOPROLOL SUCCINATE 25 MG/1
12.5 TABLET, EXTENDED RELEASE ORAL DAILY
Qty: 45 TABLET | Refills: 3 | Status: SHIPPED | OUTPATIENT
Start: 2025-07-08

## 2025-07-08 NOTE — TELEPHONE ENCOUNTER
Last Office Visit: 05/13/25 Dr. Rodriguez  Next Office Visit: TBKENRICK Casiano MA Cardiology   7/8/2025 8:25 AM

## 2025-07-08 NOTE — TELEPHONE ENCOUNTER
[facility] Cardiology Refill Guideline reviewed.  Medication meets criteria for refill. Karla Rick RN Cardiology July 8, 2025, 8:34 AM

## (undated) DEVICE — DEFIB PRO-PADZ LVP LQD GEL ADULT 8900-2105-01

## (undated) DEVICE — MANIFOLD KIT ANGIO AUTOMATED 014613

## (undated) DEVICE — CATH BALLOON NC EMERGE 3.50X20MM H7493926720350

## (undated) DEVICE — CATH JACKY 5FR 3.5 CURVE 40-5023

## (undated) DEVICE — CATH BALLOON EMERGE 2.0X12MM H7493918912200

## (undated) DEVICE — KIT HAND CONTROL ANGIOTOUCH ACIST 65CM AT-P65

## (undated) DEVICE — Device

## (undated) DEVICE — WIRE GUIDE HI-TRQ PILOT 50 JTIP 0.014"X190CM 1010480-HJ

## (undated) DEVICE — WIRE GUIDE 0.035"X260CM SAFE-T-J EXCHANGE G00517

## (undated) DEVICE — TOTE ANGIO CORP PC15AT SAN32CC83O

## (undated) DEVICE — SLEEVE TR BAND RADIAL COMPRESSION DEVICE 24CM TRB24-REG

## (undated) DEVICE — CATH BALLOON NC EUPHORA 4.00X15MM NCEUP4015X

## (undated) DEVICE — INFL DVC KIT W/10CC NITRO IN4530

## (undated) DEVICE — INTRO GLIDESHEATH SLENDER 6FR 10X45CM 60-1060

## (undated) DEVICE — CATH GUIDING 6FR AL .75 LA6AL75

## (undated) DEVICE — GUIDEWIRE VASC 0.014INX180CM RUNTHROUGH 25-1011

## (undated) DEVICE — VALVE HEMOSTASIS .096" COPILOT MECH 1003331

## (undated) RX ORDER — LIDOCAINE HYDROCHLORIDE 10 MG/ML
INJECTION, SOLUTION EPIDURAL; INFILTRATION; INTRACAUDAL; PERINEURAL
Status: DISPENSED
Start: 2019-02-25

## (undated) RX ORDER — PHENYLEPHRINE HCL IN 0.9% NACL 1 MG/10 ML
SYRINGE (ML) INTRAVENOUS
Status: DISPENSED
Start: 2019-02-25

## (undated) RX ORDER — VERAPAMIL HYDROCHLORIDE 2.5 MG/ML
INJECTION, SOLUTION INTRAVENOUS
Status: DISPENSED
Start: 2019-02-25

## (undated) RX ORDER — FENTANYL CITRATE 50 UG/ML
INJECTION, SOLUTION INTRAMUSCULAR; INTRAVENOUS
Status: DISPENSED
Start: 2019-02-25

## (undated) RX ORDER — HEPARIN SODIUM 1000 [USP'U]/ML
INJECTION, SOLUTION INTRAVENOUS; SUBCUTANEOUS
Status: DISPENSED
Start: 2019-02-25